# Patient Record
Sex: FEMALE | Race: WHITE | NOT HISPANIC OR LATINO | Employment: OTHER | URBAN - METROPOLITAN AREA
[De-identification: names, ages, dates, MRNs, and addresses within clinical notes are randomized per-mention and may not be internally consistent; named-entity substitution may affect disease eponyms.]

---

## 2017-02-08 ENCOUNTER — HOSPITAL ENCOUNTER (OUTPATIENT)
Dept: PHYSICAL THERAPY | Facility: REHABILITATION | Age: 67
End: 2017-02-08
Attending: FAMILY MEDICINE
Payer: MEDICARE

## 2017-02-08 PROCEDURE — G8984 CARRY CURRENT STATUS: HCPCS | Mod: CJ

## 2017-02-08 PROCEDURE — G8985 CARRY GOAL STATUS: HCPCS | Mod: CI

## 2017-02-08 PROCEDURE — 97162 PT EVAL MOD COMPLEX 30 MIN: CPT

## 2017-02-08 PROCEDURE — 97161 PT EVAL LOW COMPLEX 20 MIN: CPT

## 2017-02-08 PROCEDURE — 97110 THERAPEUTIC EXERCISES: CPT

## 2017-02-10 ENCOUNTER — HOSPITAL ENCOUNTER (OUTPATIENT)
Dept: PHYSICAL THERAPY | Facility: REHABILITATION | Age: 67
End: 2017-02-10
Attending: FAMILY MEDICINE
Payer: MEDICARE

## 2017-02-10 PROCEDURE — 97110 THERAPEUTIC EXERCISES: CPT

## 2017-02-10 PROCEDURE — 97140 MANUAL THERAPY 1/> REGIONS: CPT

## 2017-02-10 PROCEDURE — 97012 MECHANICAL TRACTION THERAPY: CPT

## 2017-02-16 ENCOUNTER — HOSPITAL ENCOUNTER (OUTPATIENT)
Dept: PHYSICAL THERAPY | Facility: REHABILITATION | Age: 67
End: 2017-02-16
Attending: FAMILY MEDICINE
Payer: MEDICARE

## 2017-02-16 PROCEDURE — 97014 ELECTRIC STIMULATION THERAPY: CPT

## 2017-02-16 PROCEDURE — 97110 THERAPEUTIC EXERCISES: CPT

## 2017-02-17 ENCOUNTER — APPOINTMENT (OUTPATIENT)
Dept: PHYSICAL THERAPY | Facility: REHABILITATION | Age: 67
End: 2017-02-17
Attending: FAMILY MEDICINE
Payer: MEDICARE

## 2017-02-22 ENCOUNTER — HOSPITAL ENCOUNTER (OUTPATIENT)
Dept: PHYSICAL THERAPY | Facility: REHABILITATION | Age: 67
End: 2017-02-22
Attending: FAMILY MEDICINE
Payer: MEDICARE

## 2017-02-22 PROCEDURE — 97140 MANUAL THERAPY 1/> REGIONS: CPT

## 2017-02-22 PROCEDURE — 97110 THERAPEUTIC EXERCISES: CPT

## 2017-02-22 PROCEDURE — 97112 NEUROMUSCULAR REEDUCATION: CPT

## 2017-02-28 ENCOUNTER — HOSPITAL ENCOUNTER (OUTPATIENT)
Dept: RADIOLOGY | Facility: MEDICAL CENTER | Age: 67
End: 2017-02-28
Attending: OBSTETRICS & GYNECOLOGY
Payer: MEDICARE

## 2017-02-28 DIAGNOSIS — Z13.9 SCREENING: ICD-10-CM

## 2017-02-28 PROCEDURE — 77063 BREAST TOMOSYNTHESIS BI: CPT

## 2017-03-01 ENCOUNTER — HOSPITAL ENCOUNTER (OUTPATIENT)
Dept: PHYSICAL THERAPY | Facility: REHABILITATION | Age: 67
End: 2017-03-01
Attending: FAMILY MEDICINE
Payer: MEDICARE

## 2017-03-01 PROCEDURE — 97140 MANUAL THERAPY 1/> REGIONS: CPT | Mod: XU

## 2017-03-01 PROCEDURE — 97012 MECHANICAL TRACTION THERAPY: CPT

## 2017-03-01 PROCEDURE — 97110 THERAPEUTIC EXERCISES: CPT

## 2017-03-03 ENCOUNTER — HOSPITAL ENCOUNTER (OUTPATIENT)
Dept: PHYSICAL THERAPY | Facility: REHABILITATION | Age: 67
End: 2017-03-03
Attending: FAMILY MEDICINE
Payer: MEDICARE

## 2017-03-03 PROCEDURE — 97140 MANUAL THERAPY 1/> REGIONS: CPT

## 2017-03-03 PROCEDURE — 97110 THERAPEUTIC EXERCISES: CPT

## 2017-03-08 ENCOUNTER — HOSPITAL ENCOUNTER (OUTPATIENT)
Dept: PHYSICAL THERAPY | Facility: REHABILITATION | Age: 67
End: 2017-03-08
Attending: FAMILY MEDICINE
Payer: MEDICARE

## 2017-03-08 PROCEDURE — 97140 MANUAL THERAPY 1/> REGIONS: CPT

## 2017-03-08 PROCEDURE — 97110 THERAPEUTIC EXERCISES: CPT

## 2017-03-15 ENCOUNTER — HOSPITAL ENCOUNTER (OUTPATIENT)
Dept: PHYSICAL THERAPY | Facility: REHABILITATION | Age: 67
End: 2017-03-15
Attending: FAMILY MEDICINE
Payer: MEDICARE

## 2017-03-15 PROCEDURE — 97140 MANUAL THERAPY 1/> REGIONS: CPT

## 2017-03-15 PROCEDURE — 97110 THERAPEUTIC EXERCISES: CPT

## 2017-03-22 ENCOUNTER — HOSPITAL ENCOUNTER (OUTPATIENT)
Dept: PHYSICAL THERAPY | Facility: REHABILITATION | Age: 67
End: 2017-03-22
Attending: FAMILY MEDICINE
Payer: MEDICARE

## 2017-03-22 PROCEDURE — 97110 THERAPEUTIC EXERCISES: CPT

## 2017-03-22 PROCEDURE — 97140 MANUAL THERAPY 1/> REGIONS: CPT

## 2017-04-06 ENCOUNTER — HOSPITAL ENCOUNTER (OUTPATIENT)
Dept: PHYSICAL THERAPY | Facility: REHABILITATION | Age: 67
End: 2017-04-06
Attending: FAMILY MEDICINE
Payer: MEDICARE

## 2017-04-06 PROCEDURE — 97110 THERAPEUTIC EXERCISES: CPT

## 2017-04-06 PROCEDURE — G8986 CARRY D/C STATUS: HCPCS | Mod: CI

## 2017-04-06 PROCEDURE — G8985 CARRY GOAL STATUS: HCPCS | Mod: CI

## 2018-03-01 ENCOUNTER — HOSPITAL ENCOUNTER (OUTPATIENT)
Dept: RADIOLOGY | Facility: MEDICAL CENTER | Age: 68
End: 2018-03-01
Attending: OBSTETRICS & GYNECOLOGY
Payer: MEDICARE

## 2018-03-01 DIAGNOSIS — Z12.31 SCREENING MAMMOGRAM, ENCOUNTER FOR: ICD-10-CM

## 2018-03-01 PROCEDURE — 77063 BREAST TOMOSYNTHESIS BI: CPT

## 2018-05-18 ENCOUNTER — APPOINTMENT (RX ONLY)
Dept: URBAN - METROPOLITAN AREA CLINIC 4 | Facility: CLINIC | Age: 68
Setting detail: DERMATOLOGY
End: 2018-05-18

## 2018-05-18 DIAGNOSIS — L81.4 OTHER MELANIN HYPERPIGMENTATION: ICD-10-CM

## 2018-05-18 DIAGNOSIS — L82.1 OTHER SEBORRHEIC KERATOSIS: ICD-10-CM

## 2018-05-18 DIAGNOSIS — D18.0 HEMANGIOMA: ICD-10-CM

## 2018-05-18 DIAGNOSIS — D22 MELANOCYTIC NEVI: ICD-10-CM

## 2018-05-18 PROBLEM — D22.71 MELANOCYTIC NEVI OF RIGHT LOWER LIMB, INCLUDING HIP: Status: ACTIVE | Noted: 2018-05-18

## 2018-05-18 PROBLEM — D22.62 MELANOCYTIC NEVI OF LEFT UPPER LIMB, INCLUDING SHOULDER: Status: ACTIVE | Noted: 2018-05-18

## 2018-05-18 PROBLEM — D22.72 MELANOCYTIC NEVI OF LEFT LOWER LIMB, INCLUDING HIP: Status: ACTIVE | Noted: 2018-05-18

## 2018-05-18 PROBLEM — D18.01 HEMANGIOMA OF SKIN AND SUBCUTANEOUS TISSUE: Status: ACTIVE | Noted: 2018-05-18

## 2018-05-18 PROBLEM — D22.5 MELANOCYTIC NEVI OF TRUNK: Status: ACTIVE | Noted: 2018-05-18

## 2018-05-18 PROBLEM — D22.61 MELANOCYTIC NEVI OF RIGHT UPPER LIMB, INCLUDING SHOULDER: Status: ACTIVE | Noted: 2018-05-18

## 2018-05-18 PROCEDURE — 99213 OFFICE O/P EST LOW 20 MIN: CPT

## 2018-05-18 PROCEDURE — ? COUNSELING

## 2018-05-18 PROCEDURE — ? ADDITIONAL NOTES

## 2018-05-18 PROCEDURE — ? SUNSCREEN RECOMMENDATIONS

## 2018-05-18 ASSESSMENT — LOCATION SIMPLE DESCRIPTION DERM
LOCATION SIMPLE: RIGHT POSTERIOR UPPER ARM
LOCATION SIMPLE: RIGHT THIGH
LOCATION SIMPLE: LEFT ANTERIOR NECK
LOCATION SIMPLE: UPPER BACK
LOCATION SIMPLE: LEFT FOREARM
LOCATION SIMPLE: LEFT HAND
LOCATION SIMPLE: LEFT POSTERIOR UPPER ARM
LOCATION SIMPLE: RIGHT FOREARM
LOCATION SIMPLE: CHEST
LOCATION SIMPLE: RIGHT HAND
LOCATION SIMPLE: ABDOMEN
LOCATION SIMPLE: RIGHT CHEEK
LOCATION SIMPLE: LEFT THIGH
LOCATION SIMPLE: LEFT CHEEK
LOCATION SIMPLE: LEFT UPPER BACK

## 2018-05-18 ASSESSMENT — LOCATION DETAILED DESCRIPTION DERM
LOCATION DETAILED: PERIUMBILICAL SKIN
LOCATION DETAILED: SUPERIOR THORACIC SPINE
LOCATION DETAILED: RIGHT CENTRAL MALAR CHEEK
LOCATION DETAILED: RIGHT ANTERIOR PROXIMAL THIGH
LOCATION DETAILED: LEFT INFERIOR ANTERIOR NECK
LOCATION DETAILED: LEFT CENTRAL MALAR CHEEK
LOCATION DETAILED: MIDDLE STERNUM
LOCATION DETAILED: RIGHT RADIAL DORSAL HAND
LOCATION DETAILED: LEFT ANTERIOR PROXIMAL THIGH
LOCATION DETAILED: LEFT PROXIMAL DORSAL FOREARM
LOCATION DETAILED: LEFT ULNAR DORSAL HAND
LOCATION DETAILED: LEFT PROXIMAL POSTERIOR UPPER ARM
LOCATION DETAILED: RIGHT DISTAL POSTERIOR UPPER ARM
LOCATION DETAILED: RIGHT PROXIMAL DORSAL FOREARM
LOCATION DETAILED: LEFT SUPERIOR MEDIAL UPPER BACK

## 2018-05-18 ASSESSMENT — LOCATION ZONE DERM
LOCATION ZONE: HAND
LOCATION ZONE: NECK
LOCATION ZONE: FACE
LOCATION ZONE: LEG
LOCATION ZONE: TRUNK
LOCATION ZONE: ARM

## 2018-06-06 ENCOUNTER — HOSPITAL ENCOUNTER (OUTPATIENT)
Dept: RADIOLOGY | Facility: MEDICAL CENTER | Age: 68
End: 2018-06-06
Attending: PHYSICIAN ASSISTANT
Payer: MEDICARE

## 2018-06-06 DIAGNOSIS — F41.9 ANXIETY HYPERVENTILATION: ICD-10-CM

## 2018-06-06 DIAGNOSIS — K29.40 CHRONIC EROSIVE GASTRITIS: ICD-10-CM

## 2018-06-06 DIAGNOSIS — R13.12 OROPHARYNGEAL DYSPHAGIA: ICD-10-CM

## 2018-06-06 DIAGNOSIS — F45.8 ANXIETY HYPERVENTILATION: ICD-10-CM

## 2018-06-06 PROCEDURE — G8998 SWALLOW D/C STATUS: HCPCS | Mod: CI

## 2018-06-06 PROCEDURE — G8996 SWALLOW CURRENT STATUS: HCPCS | Mod: CI

## 2018-06-06 PROCEDURE — 92611 MOTION FLUOROSCOPY/SWALLOW: CPT

## 2018-06-06 PROCEDURE — 74230 X-RAY XM SWLNG FUNCJ C+: CPT

## 2018-06-06 PROCEDURE — G8997 SWALLOW GOAL STATUS: HCPCS | Mod: CI

## 2018-06-06 NOTE — OP THERAPY EVALUATION
[facility]  Speech-Language Pathology Department  Modified Barium Swallow Study Summary    Patient: Yaz Farooq     Date of Evaluation:  6/6/2018    Referring MD: Odell Baptiste PA-C      Patient History/Reason for Referral:  Pt reports solids and liquids hanging up in her throat.  Also c/o intermittent hoarseness.  History of GERD and intestinal metaplasia.  She is scheduled for an EGD this week.     Procedure Results:  The examination was conducted with videofluoroscopy from a   Lateral and Anterior view.  The following textures were presented:   Applesauce, Diced Fruit, Cookie and Thin Liquid       The following observations were made:    Oral Phase Puree Thick Liquids Thin Liquids Mechanical Soft Solid   Anterior spillage        Residue in oral cavity after the swallow WFL N/A WFL  WFL   Decreased mastication        Loss of bolus control        Piecemeal deglutition    x    Slow oral transit time        Premature spillage into the valleculae        Premature spillage into the pyriform sinus           Other Observations:  Chews well.         Pharyngeal Phase Puree Thick Liquids Thin Liquids Mechanical Soft Solid   Delayed triggering of the pharyngeal swallow  N/A   WFL    Absent initiation of swallow        Residue on tongue base        Residue in lateral channels x       Residue in valleculae x  x     Residue in pyriform sinus x  x  x   Penetration        Aspiration          Other Observations:  Mild pharyngeal weakness with decreased epiglottal inversion resulting in pharyngeal residue.  Residue is decreased or clears with double swallows.                              Esophageal Phase:  A-P view completed with cookie and thin liquids.  Slow motility in upper esophagus with cookie followed by throat clearing and coughing.                                 Impressions:  Mild oral-pharyngeal dysphagia characterized by mild decreased bot ret and epiglottal inversion resulting in  pharyngeal residue.  Residue decreases or is eliminated with double swallow.          Recommendations: Diet:  Soft-moist       Liquid:  Thin                                        Medications:  As tolerated        Compensatory Strategies:  1.  Double swallow every couple bites/sips  2.  Effortful swallow  3.  Reflux precautions          Medicare only:   CI   CI      CI        Thank you for the referral.    For further questions, please call [phone].        Gabrielle Juan MA, CCC-SLP   Speech-Language Pathologist

## 2019-03-04 ENCOUNTER — HOSPITAL ENCOUNTER (OUTPATIENT)
Dept: RADIOLOGY | Facility: MEDICAL CENTER | Age: 69
End: 2019-03-04
Attending: OBSTETRICS & GYNECOLOGY
Payer: MEDICARE

## 2019-03-04 DIAGNOSIS — Z12.39 SCREENING BREAST EXAMINATION: ICD-10-CM

## 2019-03-04 PROCEDURE — 77063 BREAST TOMOSYNTHESIS BI: CPT

## 2019-06-10 ENCOUNTER — APPOINTMENT (OUTPATIENT)
Dept: RADIOLOGY | Facility: MEDICAL CENTER | Age: 69
End: 2019-06-10
Attending: EMERGENCY MEDICINE
Payer: MEDICARE

## 2019-06-10 ENCOUNTER — HOSPITAL ENCOUNTER (EMERGENCY)
Facility: MEDICAL CENTER | Age: 69
End: 2019-06-10
Attending: EMERGENCY MEDICINE
Payer: MEDICARE

## 2019-06-10 VITALS
HEIGHT: 60 IN | RESPIRATION RATE: 19 BRPM | WEIGHT: 143.52 LBS | OXYGEN SATURATION: 97 % | TEMPERATURE: 99.8 F | HEART RATE: 82 BPM | SYSTOLIC BLOOD PRESSURE: 141 MMHG | BODY MASS INDEX: 28.18 KG/M2 | DIASTOLIC BLOOD PRESSURE: 86 MMHG

## 2019-06-10 DIAGNOSIS — R53.83 FATIGUE, UNSPECIFIED TYPE: ICD-10-CM

## 2019-06-10 DIAGNOSIS — M75.81 ROTATOR CUFF TENDINITIS, RIGHT: ICD-10-CM

## 2019-06-10 DIAGNOSIS — E87.6 HYPOKALEMIA: ICD-10-CM

## 2019-06-10 LAB
ALBUMIN SERPL BCP-MCNC: 3.9 G/DL (ref 3.2–4.9)
ALBUMIN/GLOB SERPL: 1.3 G/DL
ALP SERPL-CCNC: 82 U/L (ref 30–99)
ALT SERPL-CCNC: 24 U/L (ref 2–50)
ANION GAP SERPL CALC-SCNC: 11 MMOL/L (ref 0–11.9)
APPEARANCE UR: CLEAR
AST SERPL-CCNC: 30 U/L (ref 12–45)
BASOPHILS # BLD AUTO: 0.3 % (ref 0–1.8)
BASOPHILS # BLD: 0.03 K/UL (ref 0–0.12)
BILIRUB SERPL-MCNC: 0.7 MG/DL (ref 0.1–1.5)
BILIRUB UR QL STRIP.AUTO: NEGATIVE
BUN SERPL-MCNC: 11 MG/DL (ref 8–22)
CALCIUM SERPL-MCNC: 9.1 MG/DL (ref 8.4–10.2)
CHLORIDE SERPL-SCNC: 103 MMOL/L (ref 96–112)
CK SERPL-CCNC: 132 U/L (ref 0–154)
CO2 SERPL-SCNC: 24 MMOL/L (ref 20–33)
COLOR UR: YELLOW
CREAT SERPL-MCNC: 0.62 MG/DL (ref 0.5–1.4)
EKG IMPRESSION: NORMAL
EOSINOPHIL # BLD AUTO: 0.13 K/UL (ref 0–0.51)
EOSINOPHIL NFR BLD: 1.2 % (ref 0–6.9)
ERYTHROCYTE [DISTWIDTH] IN BLOOD BY AUTOMATED COUNT: 45.8 FL (ref 35.9–50)
GLOBULIN SER CALC-MCNC: 3 G/DL (ref 1.9–3.5)
GLUCOSE SERPL-MCNC: 105 MG/DL (ref 65–99)
GLUCOSE UR STRIP.AUTO-MCNC: NEGATIVE MG/DL
HCT VFR BLD AUTO: 44.2 % (ref 37–47)
HGB BLD-MCNC: 14.5 G/DL (ref 12–16)
IMM GRANULOCYTES # BLD AUTO: 0.04 K/UL (ref 0–0.11)
IMM GRANULOCYTES NFR BLD AUTO: 0.4 % (ref 0–0.9)
KETONES UR STRIP.AUTO-MCNC: NEGATIVE MG/DL
LEUKOCYTE ESTERASE UR QL STRIP.AUTO: NEGATIVE
LYMPHOCYTES # BLD AUTO: 1.58 K/UL (ref 1–4.8)
LYMPHOCYTES NFR BLD: 14.5 % (ref 22–41)
MCH RBC QN AUTO: 32.1 PG (ref 27–33)
MCHC RBC AUTO-ENTMCNC: 32.8 G/DL (ref 33.6–35)
MCV RBC AUTO: 97.8 FL (ref 81.4–97.8)
MICRO URNS: NORMAL
MONOCYTES # BLD AUTO: 0.98 K/UL (ref 0–0.85)
MONOCYTES NFR BLD AUTO: 9 % (ref 0–13.4)
NEUTROPHILS # BLD AUTO: 8.17 K/UL (ref 2–7.15)
NEUTROPHILS NFR BLD: 74.6 % (ref 44–72)
NITRITE UR QL STRIP.AUTO: NEGATIVE
NRBC # BLD AUTO: 0 K/UL
NRBC BLD-RTO: 0 /100 WBC
PH UR STRIP.AUTO: 6 [PH]
PLATELET # BLD AUTO: 183 K/UL (ref 164–446)
PMV BLD AUTO: 11.7 FL (ref 9–12.9)
POTASSIUM SERPL-SCNC: 3.5 MMOL/L (ref 3.6–5.5)
PROT SERPL-MCNC: 6.9 G/DL (ref 6–8.2)
PROT UR QL STRIP: NEGATIVE MG/DL
RBC # BLD AUTO: 4.52 M/UL (ref 4.2–5.4)
RBC UR QL AUTO: NEGATIVE
SODIUM SERPL-SCNC: 138 MMOL/L (ref 135–145)
SP GR UR STRIP.AUTO: <=1.005
T4 FREE SERPL-MCNC: 0.95 NG/DL (ref 0.58–1.64)
TROPONIN I SERPL-MCNC: <0.02 NG/ML (ref 0–0.04)
TSH SERPL DL<=0.005 MIU/L-ACNC: 3.76 UIU/ML (ref 0.38–5.33)
WBC # BLD AUTO: 10.9 K/UL (ref 4.8–10.8)

## 2019-06-10 PROCEDURE — 84443 ASSAY THYROID STIM HORMONE: CPT

## 2019-06-10 PROCEDURE — 81003 URINALYSIS AUTO W/O SCOPE: CPT

## 2019-06-10 PROCEDURE — 99284 EMERGENCY DEPT VISIT MOD MDM: CPT

## 2019-06-10 PROCEDURE — 84484 ASSAY OF TROPONIN QUANT: CPT

## 2019-06-10 PROCEDURE — 93005 ELECTROCARDIOGRAM TRACING: CPT | Performed by: EMERGENCY MEDICINE

## 2019-06-10 PROCEDURE — 36415 COLL VENOUS BLD VENIPUNCTURE: CPT

## 2019-06-10 PROCEDURE — 93005 ELECTROCARDIOGRAM TRACING: CPT

## 2019-06-10 PROCEDURE — 70450 CT HEAD/BRAIN W/O DYE: CPT

## 2019-06-10 PROCEDURE — 72125 CT NECK SPINE W/O DYE: CPT

## 2019-06-10 PROCEDURE — 82550 ASSAY OF CK (CPK): CPT

## 2019-06-10 PROCEDURE — 80053 COMPREHEN METABOLIC PANEL: CPT

## 2019-06-10 PROCEDURE — 73030 X-RAY EXAM OF SHOULDER: CPT | Mod: RT

## 2019-06-10 PROCEDURE — 84439 ASSAY OF FREE THYROXINE: CPT

## 2019-06-10 PROCEDURE — 85025 COMPLETE CBC W/AUTO DIFF WBC: CPT

## 2019-06-10 RX ORDER — LEVOTHYROXINE SODIUM 0.05 MG/1
50 TABLET ORAL
Status: SHIPPED | COMMUNITY
End: 2020-08-13

## 2019-06-10 NOTE — ED PROVIDER NOTES
ED Provider Note    CHIEF COMPLAINT  Fatigue  Right shoulder pain    HPI  Yaz Farooq is a 69 y.o. female who presents to the emergency department with a chief complaint of right shoulder pain.  States that the pain started last night out of the blue.  She denies any inciting event.  States that she had a [place]  She ate out and just perform some desk duties.  She noticed pain over her right lateral shoulder.  No radiation to the more distal extremity, but slight pain and points over her right trapezius as well as her anterior right neck.  The pain in the right shoulder is worse when she tries to lift up the shoulder.  Has mild pain when she uses her other arm to lift the right shoulder.  She has not noticed swelling or fever.  No trauma or deformity.  No weakness numbness or neurologic symptoms distally.    Patient states that over the last several weeks she has been very tired.  She describes having a foggy head and just not feeling right.  Vague headaches.  Diffuse.  No focal weakness numbness.  She was seen by her primary provider.  She was diagnosed with hypothyroidism.  She was started on 50 mcg of Synthroid daily 9 days ago.  Since starting this she reports that her vision just seems fuzzy.  This is both of her eyes.  No vision loss.  No shade-like vision loss.  No eye pain.  Denies chest pain abdominal pain.  No cough or difficulty breathing.  No lower extremity swelling.  No dysuria hematuria frequency.      REVIEW OF SYSTEMS  As per HPI, otherwise a 10 point review of systems is negative    PAST MEDICAL HISTORY  Hypothyroidism    SOCIAL HISTORY  Social History   Substance Use Topics   • Smoking status: Never Smoker   • Smokeless tobacco: Never Used   • Alcohol use Yes      Comment: 3-4 GLASSES OF WINE A WEEK       SURGICAL HISTORY  Past Surgical History:   Procedure Laterality Date   • OTHER ORTHOPEDIC SURGERY         CURRENT MEDICATIONS  Home Medications      Reviewed by Loraine Hector PhT (Pharmacy Tech) on 06/10/19 at 0736  Med List Status: Complete   Medication Last Dose Status   aspirin 81 MG tablet 6/9/2019 Active   levothyroxine (SYNTHROID) 50 MCG Tab 6/10/2019 Active                ALLERGIES  Allergies   Allergen Reactions   • Amoxicillin-Clavulanic Acid [Amoxicillin-Pot Clavulanate]      Pain in stomach   • Prochlorperazine Edisylate [Compazine] Shortness of Breath and Swelling       PHYSICAL EXAM  VITAL SIGNS: /86   Pulse 86   Temp 37.7 °C (99.8 °F) (Temporal)   Resp 18   Ht 1.524 m (5')   Wt 65.1 kg (143 lb 8.3 oz)   SpO2 96%   BMI 28.03 kg/m²    Constitutional: Awake and alert  HENT:  Atraumatic, Normocephalic.Oropharynx moist mucus membranes, Nose normal inspection.  No tenderness over the temporal arteries  Eyes: Pupils equal round and reactive to light.  Normal extraocular muscles.  Neck: Minimal right-sided tenderness including anteriorly and posteriorly.  No asymmetry.  No bruit.  Full range of motion.  Mild right-sided trapezius tenderness.  Cardiovascular: Normal heart rate, Normal rhythm.  Symmetric peripheral pulses.   Thorax & Lungs: No respiratory distress, No wheezing, No rales, No rhonchi, No chest tenderness.   Abdomen: Bowel sounds normal, soft, non-distended, nontender, no mass  Skin: Warm, Dry, No rash.   Back: No tenderness, No CVA tenderness.   Extremities: There is tenderness of the right shoulder just distal to the right acromioclavicular joint.  There is no joint effusion.  There is mild pain with passive range of motion.  Increased discomfort with right shoulder abduction and extension.  No deformity over the humerus or tenderness of the elbow.  Neurologic: Grossly normal   Psychiatric: Anxious appearing    RADIOLOGY/PROCEDURES  CT-CSPINE WITHOUT PLUS RECONS   Final Result      1.  No evidence of cervical spine fracture.      2.  Multilevel degenerative disc disease and facet degeneration.      CT-HEAD W/O   Final Result       1.  No evidence of acute intracranial process.      2.  Periventricular chronic small vessel ischemic change.      DX-SHOULDER 2+ RIGHT   Final Result      Mild right glenohumeral joint degenerative change. No evidence of fracture.           Imaging is interpreted by radiologist    Labs:  Results for orders placed or performed during the hospital encounter of 06/10/19   CBC WITH DIFFERENTIAL   Result Value Ref Range    WBC 10.9 (H) 4.8 - 10.8 K/uL    RBC 4.52 4.20 - 5.40 M/uL    Hemoglobin 14.5 12.0 - 16.0 g/dL    Hematocrit 44.2 37.0 - 47.0 %    MCV 97.8 81.4 - 97.8 fL    MCH 32.1 27.0 - 33.0 pg    MCHC 32.8 (L) 33.6 - 35.0 g/dL    RDW 45.8 35.9 - 50.0 fL    Platelet Count 183 164 - 446 K/uL    MPV 11.7 9.0 - 12.9 fL    Neutrophils-Polys 74.60 (H) 44.00 - 72.00 %    Lymphocytes 14.50 (L) 22.00 - 41.00 %    Monocytes 9.00 0.00 - 13.40 %    Eosinophils 1.20 0.00 - 6.90 %    Basophils 0.30 0.00 - 1.80 %    Immature Granulocytes 0.40 0.00 - 0.90 %    Nucleated RBC 0.00 /100 WBC    Neutrophils (Absolute) 8.17 (H) 2.00 - 7.15 K/uL    Lymphs (Absolute) 1.58 1.00 - 4.80 K/uL    Monos (Absolute) 0.98 (H) 0.00 - 0.85 K/uL    Eos (Absolute) 0.13 0.00 - 0.51 K/uL    Baso (Absolute) 0.03 0.00 - 0.12 K/uL    Immature Granulocytes (abs) 0.04 0.00 - 0.11 K/uL    NRBC (Absolute) 0.00 K/uL   COMP METABOLIC PANEL   Result Value Ref Range    Sodium 138 135 - 145 mmol/L    Potassium 3.5 (L) 3.6 - 5.5 mmol/L    Chloride 103 96 - 112 mmol/L    Co2 24 20 - 33 mmol/L    Anion Gap 11.0 0.0 - 11.9    Glucose 105 (H) 65 - 99 mg/dL    Bun 11 8 - 22 mg/dL    Creatinine 0.62 0.50 - 1.40 mg/dL    Calcium 9.1 8.4 - 10.2 mg/dL    AST(SGOT) 30 12 - 45 U/L    ALT(SGPT) 24 2 - 50 U/L    Alkaline Phosphatase 82 30 - 99 U/L    Total Bilirubin 0.7 0.1 - 1.5 mg/dL    Albumin 3.9 3.2 - 4.9 g/dL    Total Protein 6.9 6.0 - 8.2 g/dL    Globulin 3.0 1.9 - 3.5 g/dL    A-G Ratio 1.3 g/dL   TROPONIN   Result Value Ref Range    Troponin I <0.02 0.00 - 0.04  ng/mL   URINALYSIS CULTURE, IF INDICATED   Result Value Ref Range    Color Yellow     Character Clear     Specific Gravity <=1.005 <1.035    Ph 6.0 5.0 - 8.0    Glucose Negative Negative mg/dL    Ketones Negative Negative mg/dL    Protein Negative Negative mg/dL    Bilirubin Negative Negative    Nitrite Negative Negative    Leukocyte Esterase Negative Negative    Occult Blood Negative Negative    Micro Urine Req see below    TSH   Result Value Ref Range    TSH 3.760 0.380 - 5.330 uIU/mL   FREE THYROXINE   Result Value Ref Range    Free T-4 0.95 0.58 - 1.64 ng/dL   CREATINE KINASE   Result Value Ref Range    CPK Total 132 0 - 154 U/L   ESTIMATED GFR   Result Value Ref Range    GFR If African American >60 >60 mL/min/1.73 m 2    GFR If Non African American >60 >60 mL/min/1.73 m 2   EKG   Result Value Ref Range    Report       [facility] Emergency Dept.    Test Date:  2019-06-10  Pt Name:    MEÑO QUILES             Department: [facility]  MRN:        [id number]                      [facility]  Gender:     Female                       Technician: RAFAELA  :        [id number]                   Requested By:ER TRIAGE PROTOCOL  Order #:    [id number]                    Reading MD: ZACHERY CANTOR MD    Measurements  Intervals                                Axis  Rate:       87                           P:          -42  MI:         144                          QRS:        3  QRSD:       89                           T:          21  QT:         358  QTc:        431    Interpretive Statements  Sinus rhythm  Borderline low voltage, extremity leads  No previous ECG available for comparison    Electronically Signed On 6- 8:11:37 PDT by ZACHERY CANTOR MD         COURSE & MEDICAL DECISION MAKING  Patient presents with complaints as listed above.  Her physical exam was benign.  She has reproducible active pain over the right shoulder supraspinatus primarily suggesting tendinitis.  She does not  have a joint effusion or remarkable pain with passive range of motion.  No fever.  No suggestion of a septic arthritis.  Because of her fatigue a thorough evaluation was undertaken.  I did obtain CT scan of the head and cervical spine because of neck pain as well as complaints of headaches without any focal neurologic symptoms.  The studies returned negative.  Laboratory demonstrates a WBC count that is just barely out of the upper limits of normal.  She has mild hypokalemia.  Her thyroid function is normal.  She is a constant symptoms with a unremarkable EKG and a normal troponin.  She does not have any focal findings on exam or by data to suggest an infectious process.      At this point of advised symptomatic management for her right rotator cuff tendinitis.  NSAIDs as needed, Tylenol as needed.  Ice rest.  Gentle range of motion exercises.    I would like her to follow-up with her primary provider for further evaluation with regards to her generalized fatigue that at this point is unexplained.  She describes noticing her vision getting worse.  Of advised that she see her eye doctor for evaluation.  She does not have any alarming findings on exam.    I precautioned her to return the ER for any chest pain, shortness of breath, focal weakness or numbness, abdominal pain or specific symptoms.    FINAL IMPRESSION  1.  Fatigue  2.  Right rotator cuff tendinitis      This dictation was created using voice recognition software. The accuracy of the dictation is limited to the abilities of the software.  The nursing notes were reviewed and certain aspects of this information were incorporated into this note.      Electronically signed by: Harry Arellano, 6/10/2019 8:06 AM

## 2019-06-10 NOTE — ED NOTES
Discharge instructions provided.  Pt verbalized the understanding of discharge instructions to follow up with PCP and to return to ER if condition worsens.  Pt ambulated out of ER without difficulty. RX-0.

## 2019-06-10 NOTE — ED NOTES
Med rec complete per pt. Allergies verified and updated. Per pt, she was started on synthroid approximately 10 days ago

## 2019-06-10 NOTE — ED NOTES
Pt stated that she feels like she can provide a UA at this time. Pt was offered a WC or a commode. Pt refusing both at this time and states she would rather walk. Pt assisted to restroom at this time with contact assist

## 2019-06-10 NOTE — ED NOTES
ERP at bedside. Pt agrees with plan of care discussed by ERP. AIDET acknowledged with patient. Jessi in low position, side rail up for pt safety. Call light within reach. Will continue to monitor.

## 2019-06-10 NOTE — ED TRIAGE NOTES
Chief Complaint   Patient presents with   • Blurred Vision     started 9 days ago after starting synthroid. also c/o dry mouth, palpitations,  body aches and pain to JOAN.    • Weakness     /86   Pulse 86   Temp 37.7 °C (99.8 °F) (Temporal)   Resp 18   Ht 1.524 m (5')   Wt 65.1 kg (143 lb 8.3 oz)   SpO2 96%   BMI 28.03 kg/m²

## 2020-03-05 ENCOUNTER — HOSPITAL ENCOUNTER (OUTPATIENT)
Dept: RADIOLOGY | Facility: MEDICAL CENTER | Age: 70
End: 2020-03-05
Attending: OBSTETRICS & GYNECOLOGY
Payer: MEDICARE

## 2020-03-05 DIAGNOSIS — Z12.31 OTHER SCREENING MAMMOGRAM: ICD-10-CM

## 2020-03-05 PROCEDURE — 77067 SCR MAMMO BI INCL CAD: CPT

## 2020-05-18 ENCOUNTER — TELEPHONE (OUTPATIENT)
Dept: SCHEDULING | Facility: IMAGING CENTER | Age: 70
End: 2020-05-18

## 2020-05-20 ENCOUNTER — OFFICE VISIT (OUTPATIENT)
Dept: MEDICAL GROUP | Facility: PHYSICIAN GROUP | Age: 70
End: 2020-05-20
Payer: MEDICARE

## 2020-05-20 VITALS
WEIGHT: 134.6 LBS | DIASTOLIC BLOOD PRESSURE: 72 MMHG | HEART RATE: 88 BPM | OXYGEN SATURATION: 97 % | TEMPERATURE: 98.5 F | RESPIRATION RATE: 20 BRPM | BODY MASS INDEX: 26.42 KG/M2 | SYSTOLIC BLOOD PRESSURE: 110 MMHG | HEIGHT: 60 IN

## 2020-05-20 DIAGNOSIS — E06.3 HASHIMOTO'S THYROIDITIS: ICD-10-CM

## 2020-05-20 DIAGNOSIS — F43.21 GRIEVING: ICD-10-CM

## 2020-05-20 DIAGNOSIS — Z11.59 NEED FOR HEPATITIS C SCREENING TEST: ICD-10-CM

## 2020-05-20 DIAGNOSIS — K21.9 GASTROESOPHAGEAL REFLUX DISEASE WITHOUT ESOPHAGITIS: ICD-10-CM

## 2020-05-20 PROCEDURE — 99205 OFFICE O/P NEW HI 60 MIN: CPT | Performed by: NURSE PRACTITIONER

## 2020-05-20 RX ORDER — THYROID, PORCINE 60 MG/1
65 TABLET ORAL DAILY
COMMUNITY
End: 2020-08-13 | Stop reason: SDUPTHER

## 2020-05-20 ASSESSMENT — PATIENT HEALTH QUESTIONNAIRE - PHQ9: CLINICAL INTERPRETATION OF PHQ2 SCORE: 0

## 2020-05-20 ASSESSMENT — FIBROSIS 4 INDEX: FIB4 SCORE: 2.34

## 2020-05-20 NOTE — ASSESSMENT & PLAN NOTE
New to me. Well controlled on Prilosec 20 mg daily. Managed by Dr Art GI. S/p EGD in 2018 w/ hiatal hernia. She also had mild oropharyngeal dysphagia, evaluated by ENT, Dr. Dye did not see any abnormalities on exam.

## 2020-05-20 NOTE — ASSESSMENT & PLAN NOTE
Lost  in 2013. Has been placed on medications for sleep in the past, has not worked well. Support systems includes children and friends. No SI /HI, pt she has a lot to live for.  Does have a few friends and staying active.  Patient states cough.  Has been difficult because she cannot do her usual activities such as going to the gym and seeing her friends.

## 2020-05-20 NOTE — PROGRESS NOTES
Chief Complaint   Patient presents with   • Establish Care       HISTORY OF PRESENT ILLNESS: Patient is a 69 y.o. female, established patient who presents today to discuss medical problems as listed below:    Health Maintenance:  COMPLETED.    Grieving  Lost  in 2013. Has been placed on medications for sleep in the past, has not worked well. Support systems includes children and friends. No SI /HI, pt she has a lot to live for.  Does have a few friends and staying active.  Patient states cough.  Has been difficult because she cannot do her usual activities such as going to the gym and seeing her friends.    GERD (gastroesophageal reflux disease)  New to me. Well controlled on Prilosec 20 mg daily. Managed by Dr Art GI. S/p EGD in 2018 w/ hiatal hernia. She also had mild oropharyngeal dysphagia, evaluated by ENT, . Hardik did not see any abnormalities on exam.    Euthroid Hashimoto's thyroiditis TSH 2.28/TPO(+) 843, June 2012  Chronic issue. Seeing Dr Jose Abrams for this. Currently taking levothyroxine 50 mcg and WP thyroid 65 mg. She has an appt with endo Dr Iverson on 6/8.  No recent data available.  Patient reports she had her labs done, will request records.  She is experiencing generalized anxiety.  Patient has lost her  7 years ago still continues to grieve, attributing part of anxiety on the grieving factor.  She does mention intermittent generalized body aches that come and go, skin if it is anything to do with her thyroid.  She denies CP, palpitations, headaches, weight loss, GI issues.      Patient Active Problem List    Diagnosis Date Noted   • Grieving 05/20/2020   • GERD (gastroesophageal reflux disease) 05/20/2020   • Euthroid Hashimoto's thyroiditis TSH 2.28/TPO(+) 843, June 2012 08/21/2012        Allergies: Amoxicillin-clavulanic acid [amoxicillin-pot clavulanate] and Prochlorperazine edisylate [compazine]    Current Outpatient Medications   Medication Sig Dispense Refill   •  thyroid (WP THYROID) 65 MG tablet Take 65 mg by mouth every day.     • levothyroxine (SYNTHROID) 50 MCG Tab Take 50 mcg by mouth Every morning on an empty stomach.     • aspirin 81 MG tablet Take 81 mg by mouth every day.       No current facility-administered medications for this visit.        Social History     Tobacco Use   • Smoking status: Never Smoker   • Smokeless tobacco: Never Used   Substance Use Topics   • Alcohol use: Yes     Comment: 3-4 GLASSES OF WINE A WEEK   • Drug use: No     Social History     Social History Narrative   • Not on file       Family History   Problem Relation Age of Onset   • Heart Disease Mother         MI   • Heart Disease Father 80        MI   • Cancer Sister         breast cancer   • Breast Cancer Sister        Allergies, past medical history, past surgical history, family history, social history reviewed and updated.    Review of Systems:     - Constitutional: Negative for fever, chills, unexpected weight change, and fatigue/generalized weakness.     - HEENT: Negative for headaches, vision changes, hearing changes, ear pain, ear discharge, rhinorrhea, sinus congestion, sore throat, and neck pain.      - Respiratory: Negative for cough, sputum production, chest congestion, dyspnea, wheezing, and crackles.      - Cardiovascular: Negative for chest pain, palpitations, orthopnea, and bilateral lower extremity edema.     - Gastrointestinal: Negative for heartburn, nausea, vomiting, abdominal pain, hematochezia, melena, diarrhea, constipation, and greasy/foul-smelling stools.     - Genitourinary: Negative for dysuria, polyuria, hematuria, pyuria, urinary urgency, and urinary incontinence.    - Musculoskeletal: Negative for myalgias, back pain, and joint pain.     - Skin: Negative for rash, itching, cyanotic skin color change.     - Neurological: Negative for dizziness, tingling, tremors, focal sensory deficit, focal weakness and headaches.     - Endo/Heme/Allergies: Does not  bruise/bleed easily.     - Psychiatric/Behavioral: Negative for depression, suicidal/homicidal ideation and memory loss.      All other systems reviewed and are negative    Exam:    /72 (BP Location: Left arm, Patient Position: Sitting, BP Cuff Size: Adult)   Pulse 88   Temp 36.9 °C (98.5 °F) (Temporal)   Resp 20   Ht 1.524 m (5')   Wt 61.1 kg (134 lb 9.6 oz)   SpO2 97%   BMI 26.29 kg/m²  Body mass index is 26.29 kg/m².    Physical Exam:  Constitutional: Well-developed and well-nourished. Not diaphoretic. No distress.   Skin: Skin is warm and dry. No rash noted.  Head: Atraumatic without lesions.  Eyes: Conjunctivae and extraocular motions are normal. Pupils are equal, round, and reactive to light. No scleral icterus.   Ears:  External ears unremarkable. Tympanic membranes clear and intact.  Nose: Nares patent. Septum midline. Turbinates without erythema nor edema. No discharge.   Mouth/Throat: Dentition is normal. Tongue normal. Oropharynx is clear and moist. Posterior pharynx without erythema or exudates.  Neck: Supple, trachea midline. Normal range of motion. No thyromegaly present. No lymphadenopathy--cervical or supraclavicular.  Cardiovascular: Regular rate and rhythm, S1 and S2 without murmur, rubs, or gallops.    Chest: Effort normal. Clear to auscultation throughout. No adventitious sounds. No CVA tenderness.  Abdomen: Soft, non tender, and without distention. Active bowel sounds in all four quadrants. No rebound, guarding, masses or HSM.  : Negative for dysuria, polyuria, hematuria, pyuria, urinary urgency, and urinary incontinence.  Extremities: No cyanosis, clubbing, erythema, nor edema. Distal pulses intact and symmetric.   Musculoskeletal: All major joints AROM full in all directions without pain.  Neurological: Alert and oriented x 3. DTRs 2+/3 and symmetric. No cranial nerve deficit. 5/5 myotomes. Sensation intact. Negative Rhomberg.  Psychiatric:  Behavior, mood, and affect are  appropriate.  MA/nursing note and vitals reviewed.    Patient was seen for 40 minutes face to face of which > 50% of appointment time was spent on counseling and coordination of care regarding the above.     Assessment/Plan:  1. Need for hepatitis C screening test  - HCV Scrn ( [id number] [employer]); Future    2. Grieving  Stable     5. Gastroesophageal reflux disease without esophagitis  Well-controlled on Prilosec and lifestyle.  Monitored by Dr. Dexter    6. Euthroid Hashimoto's thyroiditis TSH 2.28/TPO(+) 843, 2012  Will obtain labs for records.  Patient has an appointment with endocrinology on 2020.       Discussed with patient possible alternative diagnoses, pt is to take all medications as prescribed. If symptoms persist FU w/PCP, if symptoms worsen go to emergency room. If experiencing any side effects from prescribed medications reports to the office immediately or go to emergency room.  Reviewed indication, dosage, usage and potential adverse effects of prescribed medications. Reviewed risks and benefits of treatment plan. Patient verbalizes understanding of all instruction and verbally agrees to plan.    Return if symptoms worsen or fail to improve.

## 2020-05-20 NOTE — ASSESSMENT & PLAN NOTE
Chronic issue. Seeing Dr Jose Abrams for this. Currently taking levothyroxine 50 mcg and WP thyroid 65 mg. She has an appt with vicki Iverson on 6/8.  No recent data available.  Patient reports she had her labs done, will request records.  She is experiencing generalized anxiety.  Patient has lost her  7 years ago still continues to grieve, attributing part of anxiety on the grieving factor.  She does mention intermittent generalized body aches that come and go, skin if it is anything to do with her thyroid.  She denies CP, palpitations, headaches, weight loss, GI issues.

## 2020-06-08 ENCOUNTER — OFFICE VISIT (OUTPATIENT)
Dept: ENDOCRINOLOGY | Facility: MEDICAL CENTER | Age: 70
End: 2020-06-08
Payer: MEDICARE

## 2020-06-08 VITALS
HEIGHT: 60 IN | SYSTOLIC BLOOD PRESSURE: 128 MMHG | WEIGHT: 133.8 LBS | DIASTOLIC BLOOD PRESSURE: 28 MMHG | HEART RATE: 90 BPM | BODY MASS INDEX: 26.27 KG/M2

## 2020-06-08 DIAGNOSIS — E06.3 HYPOTHYROIDISM, ACQUIRED, AUTOIMMUNE: ICD-10-CM

## 2020-06-08 PROCEDURE — 99204 OFFICE O/P NEW MOD 45 MIN: CPT | Performed by: INTERNAL MEDICINE

## 2020-06-08 ASSESSMENT — FIBROSIS 4 INDEX: FIB4 SCORE: 2.34

## 2020-06-08 NOTE — PROGRESS NOTES
"Chief Complaint   Patient presents with   • Hypothyroidism     Autoimmune / BQS=459  + US      Endocrine consultation requested by Arabella BLACK for this 69-year-old with a history of thyroiditis.    HPI:         The patient insists that she saw me in 2012 concerning her Hashimoto's thyroiditis.  Actually she saw Dr. Melton but does not recognize the name.  Dr. Melton saw her on one occasion with reference to her hypothyroidism she motors thyroiditis.  She had definitely positive TPO antibodies at that time listed at 843.  Subsequently she is had a thyroid ultrasound done in 2012 which shows a \"heterogeneous\" thyroid gland which is also consistent with thyroiditis.  The gland is not enlarged or nodules present.        Back in 2012 there was some question whether or not she should be on thyroid hormone.  At one point a TSH was done with finding a result of 5.5.  He was given [place] Thyroid which caused abdominal pain.  Therefore the thyroid replacement was stopped and a follow-up TSH was 2.2 and therefore Dr. Melton did not recommend continued thyroid replacement.  He did recommend close follow-up.  Subsequently another provider gave her Synthroid which caused muscle pain.  I do not have reference to that encounter but it caused her to stop the thyroid again.         Most recently she has come under the care of Dr. Jose Abrams.  I have laboratory data from last fall.  November 4 TSH 19.5 and free thyroxine index 1.6 (1.4-3.8.  On November 8 TPO antibodies once again positive at 544.  I am not sure what took place at that point but the next lab result came in January 2020 finding a TSH of 90.  Free T4 0.2 free T3 1.1 and total T4 0.8 (4.8-10.4.  Markedly elevated iodine level of 481.  Which she was taking in bedtime is unclear.  I think at that point WP thyroid was prescribed in February 28 this year her TSH 2.9 with free T4 mid range at 1.0 and free T3 also mid range is 3.3          All other tests " were done at that time chemistry panel CBC which were normal iodine was down to 56 at she was taking nature thyroid 65 grains 1/4 grai vitamin D good at 60 and negative MARITZA and C-reactive protein sed rate 14.  She was complaining of diffuse myalgias hair loss and fatigue.          Last lab I have is from April 23 TSH= 0.9.  That time she was taking nature thyroid 65 g and another 1 portogram per day was added.  She still has diffuse myalgias.  Generally feeling.  We will see Dr. Abrams again for another thyroid     ROS:  The patient expresses a great deal of anxiety about the lack of a primary care physician who is an experienced internist.  She is trying to establish that relationship but so far unsuccessful.  Is a  and quite worried about future health and care.  Also very upset about the isolation related to COVID-19.  As the attending a gym for exercise and yoga classes that has been temporarily curtailed.      Allergies:   Allergies   Allergen Reactions   • Amoxicillin-Clavulanic Acid [Amoxicillin-Pot Clavulanate]      Pain in stomach   • Prochlorperazine Edisylate [Compazine] Shortness of Breath and Swelling       Current medicines including changes today:  Current Outpatient Medications   Medication Sig Dispense Refill   • thyroid (WP THYROID) 65 MG tablet Take 65 mg by mouth every day.     • levothyroxine (SYNTHROID) 50 MCG Tab Take 50 mcg by mouth Every morning on an empty stomach.     • aspirin 81 MG tablet Take 81 mg by mouth every day.       No current facility-administered medications for this visit.         Past Medical History:   Diagnosis Date   • Anxiety    • GERD (gastroesophageal reflux disease)    • Osteoporosis    • Thyroid disease 2019    hashimotos     Family history       Other family members have had hypothyroidism but no Hashimoto's as far she knows.    Social history          Patient is .  Previously she and her  had a good business and relationship is very secure at  the present time.    PHYSICAL EXAM:    BP (!) 128/28 (BP Location: Left arm, Patient Position: Sitting, BP Cuff Size: Adult)   Pulse 90   Ht 1.524 m (5')   Wt 60.7 kg (133 lb 12.8 oz)   BMI 26.13 kg/m²     Gen.   appears healthy     Skin   appropriate for sex and age    HEENT  unremarkable    Neck   gland is palpable and about normal size.  Somewhat firm consistent with thyroiditis              not namrata and no nodules    Heart  regular    Extremities  no edema    Neuro  gait and station normal    Psych  appropriate    ASSESSMENT AND RECOMMENDATIONS    1. Hypothyroidism, acquired, autoimmune             Post Hashimoto's thyroiditis in detail as Dr. Melton had previously in 2012.             She is definitely against taking levothyroxine or Synthroid.  Dr. Abrams is managing Nature thyroid which she is tolerating I encouraged her to continue under his care.  She is still complaining of diffuse myalgias Dr. Abrams has screen for conditions.  I suggested she might have fibromyalgia and consider seeing a rheumatologist.          DISPOSITION: I will try to help her establish with a PCP physician                             I think that would help her feel more secure for the future                             No need to return to my clinic      Elliot Iverson M.D.    Copies to: Arabella Chambers A.P.R.N. [phone]

## 2020-06-15 ENCOUNTER — TELEPHONE (OUTPATIENT)
Dept: ENDOCRINOLOGY | Facility: MEDICAL CENTER | Age: 70
End: 2020-06-15

## 2020-06-15 NOTE — TELEPHONE ENCOUNTER
During patient's last visit with Dr. Iverson he was going to talk with Dr. Keon Meza about having her establish care with him. Patient is calling to get an update to see if Dr. Iverson has been able to talk with Dr. Meza about this.

## 2020-06-30 ENCOUNTER — TELEPHONE (OUTPATIENT)
Dept: ENDOCRINOLOGY | Facility: MEDICAL CENTER | Age: 70
End: 2020-06-30

## 2020-06-30 NOTE — TELEPHONE ENCOUNTER
Patient states was told during her last visit with Dr. Iverson that he would assist her in setting her up with a PCP. Patient is calling to follow up on that.     [phone]

## 2020-07-01 ENCOUNTER — HOSPITAL ENCOUNTER (OUTPATIENT)
Dept: RADIOLOGY | Facility: MEDICAL CENTER | Age: 70
End: 2020-07-01
Attending: OBSTETRICS & GYNECOLOGY
Payer: MEDICARE

## 2020-07-01 DIAGNOSIS — Z80.41 FAMILY HISTORY OF MALIGNANT NEOPLASM OF OVARY: ICD-10-CM

## 2020-07-01 PROCEDURE — 76830 TRANSVAGINAL US NON-OB: CPT

## 2020-07-27 NOTE — TELEPHONE ENCOUNTER
Phone Number Called: [phone]    Call outcome: Left detailed message for patient. Informed to call back with any additional questions.    Message: Contacted patient to let her know per Dr. Iverson she should call the office to see if she can get in for an appointment. With Dr. Meza because Dr. Iverson stated he called him a while ago but never received an answer whether it was yes or no.

## 2020-08-13 ENCOUNTER — OFFICE VISIT (OUTPATIENT)
Dept: MEDICAL GROUP | Age: 70
End: 2020-08-13
Payer: MEDICARE

## 2020-08-13 VITALS
HEIGHT: 60 IN | SYSTOLIC BLOOD PRESSURE: 126 MMHG | WEIGHT: 132 LBS | OXYGEN SATURATION: 99 % | DIASTOLIC BLOOD PRESSURE: 80 MMHG | HEART RATE: 76 BPM | BODY MASS INDEX: 25.91 KG/M2 | TEMPERATURE: 97.5 F

## 2020-08-13 DIAGNOSIS — K21.9 GASTROESOPHAGEAL REFLUX DISEASE, ESOPHAGITIS PRESENCE NOT SPECIFIED: ICD-10-CM

## 2020-08-13 DIAGNOSIS — Z11.59 NEED FOR HEPATITIS C SCREENING TEST: ICD-10-CM

## 2020-08-13 DIAGNOSIS — E78.5 DYSLIPIDEMIA: ICD-10-CM

## 2020-08-13 DIAGNOSIS — H10.13 ALLERGIC CONJUNCTIVITIS OF BOTH EYES: ICD-10-CM

## 2020-08-13 DIAGNOSIS — E06.3 HYPOTHYROIDISM, ACQUIRED, AUTOIMMUNE: ICD-10-CM

## 2020-08-13 DIAGNOSIS — J30.89 ENVIRONMENTAL AND SEASONAL ALLERGIES: ICD-10-CM

## 2020-08-13 DIAGNOSIS — Z12.11 SCREEN FOR COLON CANCER: ICD-10-CM

## 2020-08-13 DIAGNOSIS — E55.9 VITAMIN D DEFICIENCY: ICD-10-CM

## 2020-08-13 DIAGNOSIS — R73.01 IFG (IMPAIRED FASTING GLUCOSE): ICD-10-CM

## 2020-08-13 PROCEDURE — 99204 OFFICE O/P NEW MOD 45 MIN: CPT | Performed by: INTERNAL MEDICINE

## 2020-08-13 RX ORDER — NAPHAZOLINE HYDROCHLORIDE AND PHENIRAMINE MALEATE .25; 3 MG/ML; MG/ML
1 SOLUTION/ DROPS OPHTHALMIC 4 TIMES DAILY
Qty: 5 ML | Refills: 4 | Status: SHIPPED | OUTPATIENT
Start: 2020-08-13 | End: 2021-12-07

## 2020-08-13 RX ORDER — THYROID, PORCINE 60 MG/1
65 TABLET ORAL DAILY
Qty: 90 TAB | Refills: 4 | Status: SHIPPED | DISCHARGE
Start: 2020-08-13 | End: 2022-08-03

## 2020-08-13 ASSESSMENT — ENCOUNTER SYMPTOMS
GASTROINTESTINAL NEGATIVE: 1
PSYCHIATRIC NEGATIVE: 1
RESPIRATORY NEGATIVE: 1
CONSTITUTIONAL NEGATIVE: 1
EYES NEGATIVE: 1
CARDIOVASCULAR NEGATIVE: 1
MUSCULOSKELETAL NEGATIVE: 1
NEUROLOGICAL NEGATIVE: 1

## 2020-08-13 ASSESSMENT — FIBROSIS 4 INDEX: FIB4 SCORE: 2.38

## 2020-08-13 NOTE — PROGRESS NOTES
Subjective:      Yaz Farooq is a 70 y.o. female who presents with Establish Care and Seasonal Allergies (itchy eyes )  Is a new patient here to get established.  Needs primary care physician.  Is followed locally by Dr. Abrams for her thyroid which according to most recent labs appears well controlled on current dosage.    Chief complaint is seasonal allergies with itchy watery eyes and runny nose and postnasal drip.    She is followed by Dr. Greenberg of the GI service for her chronic GERD and is due for her 10-year colonoscopy.    Reviewed past labs that show borderline elevated blood sugar which is not been followed with A1c.  And there is been no lipid panel was done.  The patient is here for followup of chronic medical problems listed below. The patient is compliant with medications and having no side effects from them. Denies chest pain, abdominal pain, dyspnea, myalgias, or cough.   Patient Active Problem List    Diagnosis Date Noted   • IFG (impaired fasting glucose) 08/13/2020   • Environmental and seasonal allergies 08/13/2020   • Allergic conjunctivitis of both eyes 08/13/2020   • Hypothyroidism, acquired, autoimmune 06/08/2020   • Grieving 05/20/2020   • GERD (gastroesophageal reflux disease) 05/20/2020     Allergies   Allergen Reactions   • Amoxicillin-Clavulanic Acid [Amoxicillin-Pot Clavulanate]      Pain in stomach   • Prochlorperazine Edisylate [Compazine] Shortness of Breath and Swelling     l    Outpatient Medications Prior to Visit   Medication Sig Dispense Refill   • thyroid (WP THYROID) 65 MG tablet Take 65 mg by mouth every day.     • levothyroxine (SYNTHROID) 50 MCG Tab Take 50 mcg by mouth Every morning on an empty stomach.     • aspirin 81 MG tablet Take 81 mg by mouth every day.       No facility-administered medications prior to visit.              HPI    Review of Systems   Constitutional: Negative.    HENT: Negative.    Eyes: Negative.    Respiratory: Negative.    Cardiovascular:  Negative.    Gastrointestinal: Negative.    Genitourinary: Negative.    Musculoskeletal: Negative.    Skin: Negative.    Neurological: Negative.    Endo/Heme/Allergies: Negative.    Psychiatric/Behavioral: Negative.           Objective:     /80 (BP Location: Right arm, Patient Position: Sitting, BP Cuff Size: Adult)   Pulse 76   Temp 36.4 °C (97.5 °F) (Temporal)   Ht 1.524 m (5')   Wt 59.9 kg (132 lb)   SpO2 99%   BMI 25.78 kg/m²      Physical Exam  Vitals signs reviewed.   Constitutional:       General: She is not in acute distress.     Appearance: She is well-developed. She is not diaphoretic.   HENT:      Head: Normocephalic and atraumatic.      Right Ear: External ear normal.      Left Ear: External ear normal.      Nose: Nose normal.      Mouth/Throat:      Pharynx: No oropharyngeal exudate.   Eyes:      General: No scleral icterus.        Right eye: No discharge.         Left eye: No discharge.      Conjunctiva/sclera: Conjunctivae normal.      Pupils: Pupils are equal, round, and reactive to light.   Neck:      Musculoskeletal: Normal range of motion and neck supple.      Thyroid: No thyromegaly.      Vascular: No JVD.      Trachea: No tracheal deviation.   Cardiovascular:      Rate and Rhythm: Normal rate and regular rhythm.      Heart sounds: Normal heart sounds. No murmur. No friction rub. No gallop.    Pulmonary:      Effort: Pulmonary effort is normal. No respiratory distress.      Breath sounds: Normal breath sounds. No stridor. No wheezing or rales.   Chest:      Chest wall: No tenderness.   Abdominal:      General: Bowel sounds are normal. There is no distension.      Palpations: Abdomen is soft. There is no mass.      Tenderness: There is no abdominal tenderness. There is no guarding or rebound.   Musculoskeletal: Normal range of motion.         General: No tenderness.   Lymphadenopathy:      Cervical: No cervical adenopathy.   Skin:     General: Skin is warm and dry.      Coloration:  Skin is not pale.      Findings: No erythema or rash.   Neurological:      Mental Status: She is alert and oriented to person, place, and time.      Cranial Nerves: No cranial nerve deficit.      Motor: No abnormal muscle tone.      Coordination: Coordination normal.      Deep Tendon Reflexes: Reflexes are normal and symmetric. Reflexes normal.   Psychiatric:         Behavior: Behavior normal.         Thought Content: Thought content normal.         Judgment: Judgment normal.                 Assessment/Plan:        1. Hypothyroidism, acquired, autoimmune    Under good control. Continue same regimen.  Patient will follow up with Dr. Abrams who is managing this, but I gave her the option for us to refill her medications should she prefer.    - TSH; Future  - Thyroid 16.25 MG Tab; Take 1 Tab by mouth every day. In addition to 65 mg thyroid pill  Dispense: 90 Tab; Refill: 4  - thyroid (WP THYROID) 65 MG tablet; Take 1 Tab by mouth every day. In addition to 16.25 mg thyroid pill  Dispense: 90 Tab; Refill: 4    2. Gastroesophageal reflux disease, esophagitis presence not specified-good control.  Continue current regimen.     - REFERRAL TO GASTROENTEROLOGY    3. IFG (impaired fasting glucose)  Needs further evaluation.  Labs ordered  - HEMOGLOBIN A1C; Future    4. Environmental and seasonal allergies  Mild allergic rhinitis and conjunctivitis.  Continue on saline nasal rinses and Claritin.  Start Flonase    5. Allergic conjunctivitis of both eyes-new problem.  Start antihistamine eyedrops.     - naphazoline-pheniramine (NAPHCON-A) 0.025-0.3 % ophthalmic solution; Place 1 Drop in both eyes 4 times a day.  Dispense: 5 mL; Refill: 4    6. Dyslipidemia-needs further evaluation.  Labs ordered     - TSH; Future  - Comp Metabolic Panel; Future  - Lipid Profile; Future  - CBC WITH DIFFERENTIAL; Future    7. Vitamin D deficiency-needs further evaluation labs ordered       - VITAMIN D,25 HYDROXY; Future    8. Screen for colon  cancer-as above      - REFERRAL TO GASTROENTEROLOGY    9. Need for hepatitis C screening test     - HEP C VIRUS ANTIBODY; Future

## 2020-08-14 ENCOUNTER — TELEPHONE (OUTPATIENT)
Dept: MEDICAL GROUP | Age: 70
End: 2020-08-14

## 2020-08-14 NOTE — TELEPHONE ENCOUNTER
1. Caller Name: Yaz Farooq                          Call Back Number: [phone] (home)         How would the patient prefer to be contacted with a response: Phone call OK to leave a detailed message    Pt believed that you would be sending a prescription for flonase to her pharmacy but that they have not recieved anything. I informed her that the flonase was not prescibed to her yesterday. She was wondering if you could prescibe it or if she would be able to buy it over the counter?

## 2020-08-17 NOTE — TELEPHONE ENCOUNTER
Phone Number Called: [phone]    Call outcome: Spoke to patient regarding message below.    Message: Informed patient to purchase Flonase OTC

## 2020-10-02 ENCOUNTER — TELEPHONE (OUTPATIENT)
Dept: MEDICAL GROUP | Age: 70
End: 2020-10-02

## 2020-10-03 NOTE — TELEPHONE ENCOUNTER
No.  No more pneumonia shots needed.  Just 1 vaccination each of the Prevnar 13 and Pneumovax 23 at or after age 65, and then no more are needed.

## 2020-10-03 NOTE — TELEPHONE ENCOUNTER
Phone Number Called: [phone] (home)       Call outcome: Spoke to patient regarding message below.    Message: Pt is scheduled to receive her flu shot on Monday 10/5/2020 but was wondering if she needed any more Pneumonia vaccinations as well? I informed her that it looks like she completed it but she thinks its needed ever 5 years? Please advise

## 2020-10-06 NOTE — TELEPHONE ENCOUNTER
Phone Number Called: [phone] (home)     Call outcome: Spoke to patient regarding message below.    Message: let pt know about message above

## 2020-10-21 ENCOUNTER — HOSPITAL ENCOUNTER (OUTPATIENT)
Dept: LAB | Facility: MEDICAL CENTER | Age: 70
End: 2020-10-21
Attending: INTERNAL MEDICINE
Payer: MEDICARE

## 2020-10-21 DIAGNOSIS — E78.5 DYSLIPIDEMIA: ICD-10-CM

## 2020-10-21 DIAGNOSIS — E06.3 HYPOTHYROIDISM, ACQUIRED, AUTOIMMUNE: ICD-10-CM

## 2020-10-21 DIAGNOSIS — R73.01 IFG (IMPAIRED FASTING GLUCOSE): ICD-10-CM

## 2020-10-21 DIAGNOSIS — Z11.59 NEED FOR HEPATITIS C SCREENING TEST: ICD-10-CM

## 2020-10-21 DIAGNOSIS — E55.9 VITAMIN D DEFICIENCY: ICD-10-CM

## 2020-10-21 LAB
25(OH)D3 SERPL-MCNC: 71 NG/ML (ref 30–100)
BASOPHILS # BLD AUTO: 0.4 % (ref 0–1.8)
BASOPHILS # BLD: 0.02 K/UL (ref 0–0.12)
EOSINOPHIL # BLD AUTO: 0.13 K/UL (ref 0–0.51)
EOSINOPHIL NFR BLD: 2.6 % (ref 0–6.9)
ERYTHROCYTE [DISTWIDTH] IN BLOOD BY AUTOMATED COUNT: 46.4 FL (ref 35.9–50)
EST. AVERAGE GLUCOSE BLD GHB EST-MCNC: 111 MG/DL
HBA1C MFR BLD: 5.5 % (ref 0–5.6)
HCT VFR BLD AUTO: 46.6 % (ref 37–47)
HCV AB SER QL: NORMAL
HGB BLD-MCNC: 15.4 G/DL (ref 12–16)
IMM GRANULOCYTES # BLD AUTO: 0.02 K/UL (ref 0–0.11)
IMM GRANULOCYTES NFR BLD AUTO: 0.4 % (ref 0–0.9)
LYMPHOCYTES # BLD AUTO: 1.74 K/UL (ref 1–4.8)
LYMPHOCYTES NFR BLD: 35.2 % (ref 22–41)
MCH RBC QN AUTO: 32.6 PG (ref 27–33)
MCHC RBC AUTO-ENTMCNC: 33 G/DL (ref 33.6–35)
MCV RBC AUTO: 98.7 FL (ref 81.4–97.8)
MONOCYTES # BLD AUTO: 0.48 K/UL (ref 0–0.85)
MONOCYTES NFR BLD AUTO: 9.7 % (ref 0–13.4)
NEUTROPHILS # BLD AUTO: 2.55 K/UL (ref 2–7.15)
NEUTROPHILS NFR BLD: 51.7 % (ref 44–72)
NRBC # BLD AUTO: 0 K/UL
NRBC BLD-RTO: 0 /100 WBC
PLATELET # BLD AUTO: 217 K/UL (ref 164–446)
PMV BLD AUTO: 11.8 FL (ref 9–12.9)
RBC # BLD AUTO: 4.72 M/UL (ref 4.2–5.4)
TSH SERPL DL<=0.005 MIU/L-ACNC: 2.16 UIU/ML (ref 0.38–5.33)
WBC # BLD AUTO: 4.9 K/UL (ref 4.8–10.8)

## 2020-10-21 PROCEDURE — 85025 COMPLETE CBC W/AUTO DIFF WBC: CPT

## 2020-10-21 PROCEDURE — 80061 LIPID PANEL: CPT

## 2020-10-21 PROCEDURE — 82306 VITAMIN D 25 HYDROXY: CPT

## 2020-10-21 PROCEDURE — 84443 ASSAY THYROID STIM HORMONE: CPT

## 2020-10-21 PROCEDURE — 83036 HEMOGLOBIN GLYCOSYLATED A1C: CPT | Mod: GA

## 2020-10-21 PROCEDURE — 80053 COMPREHEN METABOLIC PANEL: CPT

## 2020-10-21 PROCEDURE — 36415 COLL VENOUS BLD VENIPUNCTURE: CPT

## 2020-10-21 PROCEDURE — 86803 HEPATITIS C AB TEST: CPT

## 2020-10-22 LAB
ALBUMIN SERPL BCP-MCNC: 4.4 G/DL (ref 3.2–4.9)
ALBUMIN/GLOB SERPL: 1.6 G/DL
ALP SERPL-CCNC: 94 U/L (ref 30–99)
ALT SERPL-CCNC: 24 U/L (ref 2–50)
ANION GAP SERPL CALC-SCNC: 5 MMOL/L (ref 7–16)
AST SERPL-CCNC: 26 U/L (ref 12–45)
BILIRUB SERPL-MCNC: 0.4 MG/DL (ref 0.1–1.5)
BUN SERPL-MCNC: 12 MG/DL (ref 8–22)
CALCIUM SERPL-MCNC: 9.5 MG/DL (ref 8.5–10.5)
CHLORIDE SERPL-SCNC: 101 MMOL/L (ref 96–112)
CHOLEST SERPL-MCNC: 240 MG/DL (ref 100–199)
CO2 SERPL-SCNC: 30 MMOL/L (ref 20–33)
CREAT SERPL-MCNC: 0.51 MG/DL (ref 0.5–1.4)
FASTING STATUS PATIENT QL REPORTED: NORMAL
GLOBULIN SER CALC-MCNC: 2.8 G/DL (ref 1.9–3.5)
GLUCOSE SERPL-MCNC: 100 MG/DL (ref 65–99)
HDLC SERPL-MCNC: 78 MG/DL
LDLC SERPL CALC-MCNC: 144 MG/DL
POTASSIUM SERPL-SCNC: 4 MMOL/L (ref 3.6–5.5)
PROT SERPL-MCNC: 7.2 G/DL (ref 6–8.2)
SODIUM SERPL-SCNC: 136 MMOL/L (ref 135–145)
TRIGL SERPL-MCNC: 92 MG/DL (ref 0–149)

## 2020-10-28 ENCOUNTER — OFFICE VISIT (OUTPATIENT)
Dept: MEDICAL GROUP | Age: 70
End: 2020-10-28
Payer: MEDICARE

## 2020-10-28 VITALS
HEART RATE: 76 BPM | HEIGHT: 60 IN | TEMPERATURE: 97.4 F | DIASTOLIC BLOOD PRESSURE: 78 MMHG | BODY MASS INDEX: 25.8 KG/M2 | SYSTOLIC BLOOD PRESSURE: 120 MMHG | WEIGHT: 131.4 LBS | OXYGEN SATURATION: 98 %

## 2020-10-28 DIAGNOSIS — E55.9 VITAMIN D DEFICIENCY: ICD-10-CM

## 2020-10-28 DIAGNOSIS — E78.5 DYSLIPIDEMIA: ICD-10-CM

## 2020-10-28 DIAGNOSIS — R73.01 IFG (IMPAIRED FASTING GLUCOSE): ICD-10-CM

## 2020-10-28 DIAGNOSIS — E06.3 HYPOTHYROIDISM, ACQUIRED, AUTOIMMUNE: ICD-10-CM

## 2020-10-28 PROCEDURE — 99214 OFFICE O/P EST MOD 30 MIN: CPT | Performed by: INTERNAL MEDICINE

## 2020-10-28 ASSESSMENT — ENCOUNTER SYMPTOMS
CONSTITUTIONAL NEGATIVE: 1
CARDIOVASCULAR NEGATIVE: 1
NEUROLOGICAL NEGATIVE: 1
EYES NEGATIVE: 1
PSYCHIATRIC NEGATIVE: 1
MUSCULOSKELETAL NEGATIVE: 1
GASTROINTESTINAL NEGATIVE: 1
RESPIRATORY NEGATIVE: 1

## 2020-10-28 ASSESSMENT — FIBROSIS 4 INDEX: FIB4 SCORE: 1.74

## 2020-10-28 NOTE — PROGRESS NOTES
Subjective:      Yaz Farooq is a 70 y.o. female who presents with Lab Results  The patient is here for followup of chronic medical problems listed below. The patient is compliant with medications and having no side effects from them. Denies chest pain, abdominal pain, dyspnea, myalgias, or cough.   Patient Active Problem List    Diagnosis Date Noted   • IFG (impaired fasting glucose) 08/13/2020   • Environmental and seasonal allergies 08/13/2020   • Allergic conjunctivitis of both eyes 08/13/2020   • Hypothyroidism, acquired, autoimmune 06/08/2020   • Grieving 05/20/2020   • GERD (gastroesophageal reflux disease) 05/20/2020     Allergies   Allergen Reactions   • Amoxicillin-Clavulanic Acid [Amoxicillin-Pot Clavulanate]      Pain in stomach   • Prochlorperazine Edisylate [Compazine] Shortness of Breath and Swelling       Outpatient Medications Prior to Visit   Medication Sig Dispense Refill   • naphazoline-pheniramine (NAPHCON-A) 0.025-0.3 % ophthalmic solution Place 1 Drop in both eyes 4 times a day. 5 mL 4   • Thyroid 16.25 MG Tab Take 1 Tab by mouth every day. In addition to 65 mg thyroid pill 90 Tab 4   • thyroid (WP THYROID) 65 MG tablet Take 1 Tab by mouth every day. In addition to 16.25 mg thyroid pill 90 Tab 4     No facility-administered medications prior to visit.                HPI    Review of Systems   Constitutional: Negative.    HENT: Negative.    Eyes: Negative.    Respiratory: Negative.    Cardiovascular: Negative.    Gastrointestinal: Negative.    Genitourinary: Negative.    Musculoskeletal: Negative.    Skin: Negative.    Neurological: Negative.    Endo/Heme/Allergies: Negative.    Psychiatric/Behavioral: Negative.           Objective:     /78 (BP Location: Left arm, Patient Position: Sitting, BP Cuff Size: Adult)   Pulse 76   Temp 36.3 °C (97.4 °F) (Temporal)   Ht 1.524 m (5')   Wt 59.6 kg (131 lb 6.4 oz)   SpO2 98%   BMI 25.66 kg/m²      Physical Exam  Vitals signs reviewed.    Constitutional:       General: She is not in acute distress.     Appearance: She is well-developed. She is not diaphoretic.   HENT:      Head: Normocephalic and atraumatic.      Right Ear: External ear normal.      Left Ear: External ear normal.      Nose: Nose normal.      Mouth/Throat:      Pharynx: No oropharyngeal exudate.   Eyes:      General: No scleral icterus.        Right eye: No discharge.         Left eye: No discharge.      Conjunctiva/sclera: Conjunctivae normal.      Pupils: Pupils are equal, round, and reactive to light.   Neck:      Musculoskeletal: Normal range of motion and neck supple.      Thyroid: No thyromegaly.      Vascular: No JVD.      Trachea: No tracheal deviation.   Cardiovascular:      Rate and Rhythm: Normal rate and regular rhythm.      Heart sounds: Normal heart sounds. No murmur. No friction rub. No gallop.    Pulmonary:      Effort: Pulmonary effort is normal. No respiratory distress.      Breath sounds: Normal breath sounds. No stridor. No wheezing or rales.   Chest:      Chest wall: No tenderness.   Abdominal:      General: Bowel sounds are normal. There is no distension.      Palpations: Abdomen is soft. There is no mass.      Tenderness: There is no abdominal tenderness. There is no guarding or rebound.   Musculoskeletal: Normal range of motion.         General: No tenderness.   Lymphadenopathy:      Cervical: No cervical adenopathy.   Skin:     General: Skin is warm and dry.      Coloration: Skin is not pale.      Findings: No erythema or rash.   Neurological:      Mental Status: She is alert and oriented to person, place, and time.      Cranial Nerves: No cranial nerve deficit.      Motor: No abnormal muscle tone.      Coordination: Coordination normal.      Deep Tendon Reflexes: Reflexes are normal and symmetric. Reflexes normal.   Psychiatric:         Behavior: Behavior normal.         Thought Content: Thought content normal.         Judgment: Judgment normal.        .lll  Lab Results   Component Value Date/Time    HBA1C 5.5 10/21/2020 08:40 AM     Lab Results   Component Value Date/Time    SODIUM 136 10/21/2020 08:40 AM    POTASSIUM 4.0 10/21/2020 08:40 AM    CHLORIDE 101 10/21/2020 08:40 AM    CO2 30 10/21/2020 08:40 AM    GLUCOSE 100 (H) 10/21/2020 08:40 AM    BUN 12 10/21/2020 08:40 AM    CREATININE 0.51 10/21/2020 08:40 AM    ALKPHOSPHAT 94 10/21/2020 08:40 AM    ASTSGOT 26 10/21/2020 08:40 AM    ALTSGPT 24 10/21/2020 08:40 AM    TBILIRUBIN 0.4 10/21/2020 08:40 AM     No results found for: INR  Lab Results   Component Value Date/Time    CHOLSTRLTOT 240 (H) 10/21/2020 08:40 AM     (H) 10/21/2020 08:40 AM    HDL 78 10/21/2020 08:40 AM    TRIGLYCERIDE 92 10/21/2020 08:40 AM       No results found for: TESTOSTERONE  No results found for: TSH  Lab Results   Component Value Date/Time    FREET4 0.95 06/10/2019 07:42 AM     No results found for: URICACID  No components found for: VITB12  Lab Results   Component Value Date/Time    25HYDROXY 71 10/21/2020 08:40 AM               Assessment/Plan:        1. Hypothyroidism, acquired, autoimmune  Under good control. Continue same regimen.        2. IFG (impaired fasting glucose)  Under good control. Continue same regimen.     - HEMOGLOBIN A1C; Future    3. Dyslipidemia  Under good control. Continue same regimen.     - TSH; Future  - Comp Metabolic Panel; Future  - Lipid Profile; Future  - CBC WITH DIFFERENTIAL; Future    4. Vitamin D deficiency      Under good control. Continue same regimen.    - VITAMIN D,25 HYDROXY; Future

## 2020-11-24 ENCOUNTER — TELEPHONE (OUTPATIENT)
Dept: MEDICAL GROUP | Age: 70
End: 2020-11-24

## 2020-11-24 NOTE — TELEPHONE ENCOUNTER
VOICEMAIL  1. Caller Name: Yaz Farooq                        Call Back Number: [phone] (home)       2. Message: States that she went to a drug store and that the pharmacist informed her that she needs to be seen in order to obtain a medication?     3. Patient approves office to leave a detailed voicemail/MyChart message: N\A      Phone Number Called: [phone] (home)       Call outcome: Did not leave a detailed message. Requested patient to call back.    Message: 1st attempt

## 2020-11-24 NOTE — TELEPHONE ENCOUNTER
Pt left voicemail stating that she canceled her appointment and that she appreciates us calling her back.

## 2020-11-24 NOTE — TELEPHONE ENCOUNTER
Phone Number Called: [phone]    Call outcome: Left detailed message for patient. Informed to call back with any additional questions.    Message: Requested call back regarding which medication she is attempting to fill there are no encounters suggesting any refill.

## 2021-01-15 DIAGNOSIS — Z23 NEED FOR VACCINATION: ICD-10-CM

## 2021-02-16 ENCOUNTER — OFFICE VISIT (OUTPATIENT)
Dept: URGENT CARE | Facility: CLINIC | Age: 71
End: 2021-02-16
Payer: MEDICARE

## 2021-02-16 ENCOUNTER — NURSE TRIAGE (OUTPATIENT)
Dept: HEALTH INFORMATION MANAGEMENT | Facility: OTHER | Age: 71
End: 2021-02-16

## 2021-02-16 VITALS
WEIGHT: 134 LBS | HEART RATE: 77 BPM | RESPIRATION RATE: 18 BRPM | DIASTOLIC BLOOD PRESSURE: 62 MMHG | OXYGEN SATURATION: 98 % | HEIGHT: 60 IN | BODY MASS INDEX: 26.31 KG/M2 | SYSTOLIC BLOOD PRESSURE: 132 MMHG | TEMPERATURE: 98.1 F

## 2021-02-16 DIAGNOSIS — Z71.89 COUNSELED ABOUT COVID-19 VIRUS INFECTION: ICD-10-CM

## 2021-02-16 DIAGNOSIS — H65.02 NON-RECURRENT ACUTE SEROUS OTITIS MEDIA OF LEFT EAR: ICD-10-CM

## 2021-02-16 DIAGNOSIS — J01.40 ACUTE NON-RECURRENT PANSINUSITIS: ICD-10-CM

## 2021-02-16 PROBLEM — R13.10 DYSPHAGIA: Status: ACTIVE | Noted: 2019-10-11

## 2021-02-16 PROCEDURE — 99214 OFFICE O/P EST MOD 30 MIN: CPT | Performed by: NURSE PRACTITIONER

## 2021-02-16 RX ORDER — AMOXICILLIN 500 MG/1
500 CAPSULE ORAL 2 TIMES DAILY
Qty: 20 CAPSULE | Refills: 0 | Status: SHIPPED | OUTPATIENT
Start: 2021-02-16 | End: 2021-02-26

## 2021-02-16 ASSESSMENT — ENCOUNTER SYMPTOMS
PHOTOPHOBIA: 0
DIARRHEA: 0
BLURRED VISION: 0
SORE THROAT: 0
VOMITING: 0
CHILLS: 0
ABDOMINAL PAIN: 0
MYALGIAS: 0
SINUS PAIN: 1
FEVER: 0
EYE DISCHARGE: 0
COUGH: 0
DOUBLE VISION: 0
NAUSEA: 0
HEADACHES: 1
EYE PAIN: 1
EYE REDNESS: 1

## 2021-02-16 ASSESSMENT — VISUAL ACUITY: OU: 1

## 2021-02-16 ASSESSMENT — FIBROSIS 4 INDEX: FIB4 SCORE: 1.74

## 2021-02-16 NOTE — PATIENT INSTRUCTIONS
Otitis Media, Adult    Otitis media occurs when there is inflammation and fluid in the middle ear. Your middle ear is a part of the ear that contains bones for hearing as well as air that helps send sounds to your brain.  What are the causes?  This condition is caused by a blockage in the eustachian tube. This tube drains fluid from the ear to the back of the nose (nasopharynx). A blockage in this tube can be caused by an object or by swelling (edema) in the tube. Problems that can cause a blockage include:  · A cold or other upper respiratory infection.  · Allergies.  · An irritant, such as tobacco smoke.  · Enlarged adenoids. The adenoids are areas of soft tissue located high in the back of the throat, behind the nose and the roof of the mouth.  · A mass in the nasopharynx.  · Damage to the ear caused by pressure changes (barotrauma).  What are the signs or symptoms?  Symptoms of this condition include:  · Ear pain.  · A fever.  · Decreased hearing.  · A headache.  · Tiredness (lethargy).  · Fluid leaking from the ear.  · Ringing in the ear.  How is this diagnosed?  This condition is diagnosed with a physical exam. During the exam your health care provider will use an instrument called an otoscope to look into your ear and check for redness, swelling, and fluid. He or she will also ask about your symptoms.  Your health care provider may also order tests, such as:  · A test to check the movement of the eardrum (pneumatic otoscopy). This test is done by squeezing a small amount of air into the ear.  · A test that changes air pressure in the middle ear to check how well the eardrum moves and whether the eustachian tube is working (tympanogram).  How is this treated?  This condition usually goes away on its own within 3-5 days. But if the condition is caused by a bacteria infection and does not go away own its own, or keeps coming back, your health care provider may:  · Prescribe antibiotic medicines to treat the  infection.  · Prescribe or recommend medicines to control pain.  Follow these instructions at home:  · Take over-the-counter and prescription medicines only as told by your health care provider.  · If you were prescribed an antibiotic medicine, take it as told by your health care provider. Do not stop taking the antibiotic even if you start to feel better.  · Keep all follow-up visits as told by your health care provider. This is important.  Contact a health care provider if:  · You have bleeding from your nose.  · There is a lump on your neck.  · You are not getting better in 5 days.  · You feel worse instead of better.  Get help right away if:  · You have severe pain that is not controlled with medicine.  · You have swelling, redness, or pain around your ear.  · You have stiffness in your neck.  · A part of your face is paralyzed.  · The bone behind your ear (mastoid) is tender when you touch it.  · You develop a severe headache.  Summary  · Otitis media is redness, soreness, and swelling of the middle ear.  · This condition usually goes away on its own within 3-5 days.  · If the problem does not go away in 3-5 days, your health care provider may prescribe or recommend medicines to treat your symptoms.  · If you were prescribed an antibiotic medicine, take it as told by your health care provider.  This information is not intended to replace advice given to you by your health care provider. Make sure you discuss any questions you have with your health care provider.  Document Released: 09/22/2005 Document Revised: 11/30/2018 Document Reviewed: 12/08/2017  Elsevier Patient Education © 2020 [employer]  Sinusitis, Adult  Sinusitis is inflammation of your sinuses. Sinuses are hollow spaces in the bones around your face. Your sinuses are located:  · Around your eyes.  · In the middle of your forehead.  · Behind your nose.  · In your cheekbones.  Mucus normally drains out of your sinuses. When your nasal tissues  become inflamed or swollen, mucus can become trapped or blocked. This allows bacteria, viruses, and fungi to grow, which leads to infection. Most infections of the sinuses are caused by a virus.  Sinusitis can develop quickly. It can last for up to 4 weeks (acute) or for more than 12 weeks (chronic). Sinusitis often develops after a cold.  What are the causes?  This condition is caused by anything that creates swelling in the sinuses or stops mucus from draining. This includes:  · Allergies.  · Asthma.  · Infection from bacteria or viruses.  · Deformities or blockages in your nose or sinuses.  · Abnormal growths in the nose (nasal polyps).  · Pollutants, such as chemicals or irritants in the air.  · Infection from fungi (rare).  What increases the risk?  You are more likely to develop this condition if you:  · Have a weak body defense system (immune system).  · Do a lot of swimming or diving.  · Overuse nasal sprays.  · Smoke.  What are the signs or symptoms?  The main symptoms of this condition are pain and a feeling of pressure around the affected sinuses. Other symptoms include:  · Stuffy nose or congestion.  · Thick drainage from your nose.  · Swelling and warmth over the affected sinuses.  · Headache.  · Upper toothache.  · A cough that may get worse at night.  · Extra mucus that collects in the throat or the back of the nose (postnasal drip).  · Decreased sense of smell and taste.  · Fatigue.  · A fever.  · Sore throat.  · Bad breath.  How is this diagnosed?  This condition is diagnosed based on:  · Your symptoms.  · Your medical history.  · A physical exam.  · Tests to find out if your condition is acute or chronic. This may include:  ? Checking your nose for nasal polyps.  ? Viewing your sinuses using a device that has a light (endoscope).  ? Testing for allergies or bacteria.  ? Imaging tests, such as an MRI or CT scan.  In rare cases, a bone biopsy may be done to rule out more serious types of fungal sinus  disease.  How is this treated?  Treatment for sinusitis depends on the cause and whether your condition is chronic or acute.  · If caused by a virus, your symptoms should go away on their own within 10 days. You may be given medicines to relieve symptoms. They include:  ? Medicines that shrink swollen nasal passages (topical intranasal decongestants).  ? Medicines that treat allergies (antihistamines).  ? A spray that eases inflammation of the nostrils (topical intranasal corticosteroids).  ? Rinses that help get rid of thick mucus in your nose (nasal saline washes).  · If caused by bacteria, your health care provider may recommend waiting to see if your symptoms improve. Most bacterial infections will get better without antibiotic medicine. You may be given antibiotics if you have:  ? A severe infection.  ? A weak immune system.  · If caused by narrow nasal passages or nasal polyps, you may need to have surgery.  Follow these instructions at home:  Medicines  · Take, use, or apply over-the-counter and prescription medicines only as told by your health care provider. These may include nasal sprays.  · If you were prescribed an antibiotic medicine, take it as told by your health care provider. Do not stop taking the antibiotic even if you start to feel better.  Hydrate and humidify    · Drink enough fluid to keep your urine pale yellow. Staying hydrated will help to thin your mucus.  · Use a cool mist humidifier to keep the humidity level in your home above 50%.  · Inhale steam for 10-15 minutes, 3-4 times a day, or as told by your health care provider. You can do this in the bathroom while a hot shower is running.  · Limit your exposure to cool or dry air.  Rest  · Rest as much as possible.  · Sleep with your head raised (elevated).  · Make sure you get enough sleep each night.  General instructions    · Apply a warm, moist washcloth to your face 3-4 times a day or as told by your health care provider. This will  help with discomfort.  · Wash your hands often with soap and water to reduce your exposure to germs. If soap and water are not available, use hand .  · Do not smoke. Avoid being around people who are smoking (secondhand smoke).  · Keep all follow-up visits as told by your health care provider. This is important.  Contact a health care provider if:  · You have a fever.  · Your symptoms get worse.  · Your symptoms do not improve within 10 days.  Get help right away if:  · You have a severe headache.  · You have persistent vomiting.  · You have severe pain or swelling around your face or eyes.  · You have vision problems.  · You develop confusion.  · Your neck is stiff.  · You have trouble breathing.  Summary  · Sinusitis is soreness and inflammation of your sinuses. Sinuses are hollow spaces in the bones around your face.  · This condition is caused by nasal tissues that become inflamed or swollen. The swelling traps or blocks the flow of mucus. This allows bacteria, viruses, and fungi to grow, which leads to infection.  · If you were prescribed an antibiotic medicine, take it as told by your health care provider. Do not stop taking the antibiotic even if you start to feel better.  · Keep all follow-up visits as told by your health care provider. This is important.  This information is not intended to replace advice given to you by your health care provider. Make sure you discuss any questions you have with your health care provider.  Document Released: 12/18/2006 Document Revised: 05/20/2019 Document Reviewed: 05/20/2019  Elsevier Patient Education © 2020 [employer]

## 2021-02-16 NOTE — TELEPHONE ENCOUNTER
Regarding: Left eye pain, left ear pain and sinus pain  ----- Message from Jojo Antunez sent at 2/16/2021  8:33 AM PST -----  Patient has left eye pain, left ear pain and sinus pain.

## 2021-02-16 NOTE — PROGRESS NOTES
Subjective:     Yaz Farooq is a 70 y.o. female who presents for Eye Pain (left sided eye/ear pain x4 days)      HPI  Pt presents for evaluation of a new problem. Yaz is a pleasant 70-year-old female presents to urgent care today with complaints of left-sided eye and ear pain.  She states that she does usually get a sinus infection around this time of the year that always affects the left side of her face.  Her pain is rated as moderate.  She has been using eyedrops prescribed by her physician for relief of her symptoms.  She does note ear drainage and swelling of the left side of her face.  She denies fever/chills/vomiting, abdominal pain, cough or sore throat.  She does have congestion and mild rhinorrhea with trace amounts of blood.  Her symptoms remain unchanged from onset of 4 days ago.   In addition, she is concerned about getting the Covid vaccine as she had a allergic reaction to Shingrix vaccine.   Review of Systems   Constitutional: Positive for malaise/fatigue. Negative for chills and fever.   HENT: Positive for congestion, ear pain, nosebleeds and sinus pain. Negative for sore throat.    Eyes: Positive for pain and redness. Negative for blurred vision, double vision, photophobia and discharge.   Respiratory: Negative for cough.    Gastrointestinal: Negative for abdominal pain, diarrhea, nausea and vomiting.   Musculoskeletal: Negative for myalgias.   Skin: Negative for rash.   Neurological: Positive for headaches.       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     ALLERGIES:   Allergies   Allergen Reactions   • Augmentin      Pain in the stomach  Other reaction(s): Abdominal Pain   • Compazine Shortness of Breath and Swelling     Other reaction(s): Respiratory Distress   • Amoxicillin-Clavulanic Acid [Amoxicillin-Pot Clavulanate]      Pain in stomach     SURGHX:   Past Surgical  History:   Procedure Laterality Date   • OTHER ORTHOPEDIC SURGERY       SOCHX:   Social History     Socioeconomic History   • Marital status:      Spouse name: Not on file   • Number of children: Not on file   • Years of education: Not on file   • Highest education level: Not on file   Occupational History   • 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   Tobacco Use   • Smoking status: Never Smoker   • Smokeless tobacco: Never Used   Substance and Sexual Activity   • Alcohol use: Yes     Comment: 3-4 GLASSES OF WINE A WEEK   • Drug use: No   • Sexual activity: Not on file   Other Topics Concern   • Not on file   Social History Narrative   • Not on file     Social Determinants of Health     Financial Resource Strain:    • Difficulty of Paying Living Expenses:    Food Insecurity:    • Worried About Running Out of Food in the Last Year:    • Ran Out of Food in the Last Year:    Transportation Needs:    • Lack of Transportation (Medical):    • Lack of Transportation (Non-Medical):    Physical Activity:    • Days of Exercise per Week:    • Minutes of Exercise per Session:    Stress:    • Feeling of Stress :    Social Connections:    • Frequency of Communication with Friends and Family:    • Frequency of Social Gatherings with Friends and Family:    • Attends [employer] Services:    • Active Member of Clubs or Organizations:    • Attends Club or Organization Meetings:    • Marital Status:    Intimate Partner Violence:    • Fear of Current or Ex-Partner:    • Emotionally Abused:    • Physically Abused:    • Sexually Abused:      FH:   Family History   Problem Relation Age of Onset   • Heart Disease Mother         MI   • Heart Disease Father 80        MI   • Cancer Sister         breast cancer   • Breast Cancer Sister          Objective:   /62 (BP Location: Right arm, Patient Position: Sitting)   Pulse 77   Temp 36.7 °C (98.1 °F) (Tympanic)   Resp 18   Ht 1.524 m (5')   Wt 60.8 kg (134 lb)   SpO2 98%   BMI 26.17 kg/m²      Physical Exam  Vitals and nursing note reviewed.   Constitutional:       General: She is not in acute distress.     Appearance: Normal appearance. She is ill-appearing.   HENT:      Head: Normocephalic and atraumatic.      Right Ear: Tympanic membrane, ear canal and external ear normal. There is no impacted cerumen.      Left Ear: External ear normal. There is no impacted cerumen. Tympanic membrane is injected. Tympanic membrane is not bulging.      Nose: Congestion and rhinorrhea present.      Right Turbinates: Enlarged and swollen.      Left Turbinates: Enlarged and swollen.      Left Sinus: Maxillary sinus tenderness and frontal sinus tenderness present.      Mouth/Throat:      Mouth: Mucous membranes are moist.      Pharynx: Uvula midline. Posterior oropharyngeal erythema present. No pharyngeal swelling, oropharyngeal exudate or uvula swelling.      Tonsils: No tonsillar exudate or tonsillar abscesses. 1+ on the right. 1+ on the left.      Comments: Positive for postnasal drip  Eyes:      General: Lids are normal. Lids are everted, no foreign bodies appreciated. Vision grossly intact. Gaze aligned appropriately. No visual field deficit.        Right eye: No discharge.         Left eye: No discharge.      Extraocular Movements: Extraocular movements intact.      Pupils: Pupils are equal, round, and reactive to light.      Comments: Positive for mild swelling of left eye.    Cardiovascular:      Rate and Rhythm: Normal rate and regular rhythm.      Pulses: Normal pulses.      Heart sounds: Normal heart sounds.   Pulmonary:      Effort: Pulmonary effort is normal. No respiratory distress.      Breath sounds: Normal breath sounds. No stridor. No wheezing, rhonchi or rales.   Chest:      Chest wall: No tenderness.   Abdominal:      General: Abdomen is flat. Bowel sounds are normal.      Palpations: Abdomen is soft.      Tenderness: There is no abdominal tenderness. There is no right CVA tenderness or left CVA  tenderness.   Musculoskeletal:         General: Normal range of motion.      Cervical back: Normal range of motion and neck supple. No tenderness.   Lymphadenopathy:      Cervical: No cervical adenopathy.   Skin:     General: Skin is warm and dry.      Capillary Refill: Capillary refill takes less than 2 seconds.   Neurological:      General: No focal deficit present.      Mental Status: She is alert and oriented to person, place, and time. Mental status is at baseline.   Psychiatric:         Mood and Affect: Mood normal.         Behavior: Behavior normal.         Thought Content: Thought content normal.         Judgment: Judgment normal.         Assessment/Plan:   Assessment    1. Non-recurrent acute serous otitis media of left ear  amoxicillin (AMOXIL) 500 MG Cap   2. Acute non-recurrent pansinusitis     3. Counseled about COVID-19 virus infection         She declines Covid testing today.  We discussed in length the pros and cons of receiving data Covid vaccine.  Questions/concerns addressed.  We discussed supportive measures including humidifier, warm salt, warm facial compresses, Flonase nasal spray in each nare daily for 1 week, rest  and increased fluids. Pt was encouraged to seek treatment back in the ER or urgent care for worsening symptoms,  fever greater than 100.5, wheezes or shortness of breath.  Time spent evaluating this patient was at least 30 minutes and includes preparing for visit, counseling/education, exam and evaluation, obtaining history, independent interpretation and ordering labs/tests/procedures.     AVS handout given and reviewed with patient. Pt educated on red flags and when to seek treatment back in ER or UC.

## 2021-02-16 NOTE — TELEPHONE ENCOUNTER
"Pt wears mask and believes that he breathing in her mask is causing air to go into her eye and cause an eye infection. Pt has clear discharge, Left eye pain and blurry vision, water behind Left ear, post nasal drip and nasal congestion. Pt would like to see PCP. Per protocol, with symptoms she would not be advised to go into office. Instead advised UC. Pt upset that she cannot go into office. No MyChart to do VV at this time. Made UC appt for pt.    Reason for Disposition  • Eye pain/discomfort and more than mild    Additional Information  • Negative: Followed an eye injury  • Negative: Eye pain from chemical in the eye  • Negative: Eye pain from foreign body in eye  • Negative: Has sinus pain or pressure  • Negative: Severe eye pain  • Negative: Complete loss of vision in one or both eyes  • Negative: Eyelids are very swollen (shut or almost) and fever  • Negative: Eyelid (outer) is very red and fever  • Negative: Foreign body sensation ('feels like something is in there') and irrigation didn't help  • Negative: Vomiting  • Negative: Ulcer or sore seen on the cornea (clear center part of the eye)  • Negative: Recent eye surgery and increasing eye pain  • Negative: Blurred vision and new or worsening  • Negative: Patient sounds very sick or weak to the triager    Answer Assessment - Initial Assessment Questions  1. ONSET: \"When did the pain start?\" (e.g., minutes, hours, days)      \"Eventually\"  2. TIMING: \"Does the pain come and go, or has it been constant since it started?\" (e.g., constant, intermittent, fleeting)      constant  3. SEVERITY: \"How bad is the pain?\"   (Scale 1-10; mild, moderate or severe)    - MILD (1-3): doesn't interfere with normal activities     - MODERATE (4-7): interferes with normal activities or awakens from sleep     - SEVERE (8-10): excruciating pain and patient unable to do normal activities      Moderate, has discharge and blurry vision  4. LOCATION: \"Where does it hurt?\"  (e.g., " "eyelid, eye, cheekbone)      Left side  5. CAUSE: \"What do you think is causing the pain?\"      Sinus infection?  6. VISION: \"Do you have blurred vision or changes in your vision?\"       Blurred vision  7. EYE DISCHARGE: \"Is there any discharge (pus) from the eye(s)?\"  If yes, ask: \"What color is it?\"       clear  8. FEVER: \"Do you have a fever?\" If so, ask: \"What is it, how was it measured, and when did it start?\"       no  9. OTHER SYMPTOMS: \"Do you have any other symptoms?\" (e.g., headache, nasal discharge, facial rash)      Post nasal drip left side, L ear  10. PREGNANCY: \"Is there any chance you are pregnant?\" \"When was your last menstrual period?\"        no    Protocols used: EYE PAIN-A-[facility]    "

## 2021-03-10 ENCOUNTER — HOSPITAL ENCOUNTER (OUTPATIENT)
Dept: RADIOLOGY | Facility: MEDICAL CENTER | Age: 71
End: 2021-03-10
Attending: OBSTETRICS & GYNECOLOGY
Payer: MEDICARE

## 2021-03-10 DIAGNOSIS — Z12.31 ENCOUNTER FOR MAMMOGRAM TO ESTABLISH BASELINE MAMMOGRAM: ICD-10-CM

## 2021-03-10 PROCEDURE — 77063 BREAST TOMOSYNTHESIS BI: CPT

## 2021-04-26 ENCOUNTER — HOSPITAL ENCOUNTER (OUTPATIENT)
Dept: LAB | Facility: MEDICAL CENTER | Age: 71
End: 2021-04-26
Attending: GENERAL PRACTICE
Payer: MEDICARE

## 2021-04-26 LAB — TSH SERPL DL<=0.005 MIU/L-ACNC: 1.59 UIU/ML (ref 0.38–5.33)

## 2021-04-26 PROCEDURE — 36415 COLL VENOUS BLD VENIPUNCTURE: CPT

## 2021-04-26 PROCEDURE — 84443 ASSAY THYROID STIM HORMONE: CPT

## 2021-05-13 ENCOUNTER — OFFICE VISIT (OUTPATIENT)
Dept: MEDICAL GROUP | Age: 71
End: 2021-05-13
Payer: MEDICARE

## 2021-05-13 VITALS
DIASTOLIC BLOOD PRESSURE: 72 MMHG | BODY MASS INDEX: 26 KG/M2 | HEIGHT: 60 IN | OXYGEN SATURATION: 96 % | HEART RATE: 70 BPM | SYSTOLIC BLOOD PRESSURE: 120 MMHG | WEIGHT: 132.4 LBS | TEMPERATURE: 98.1 F

## 2021-05-13 DIAGNOSIS — R73.01 IFG (IMPAIRED FASTING GLUCOSE): ICD-10-CM

## 2021-05-13 DIAGNOSIS — E06.3 HYPOTHYROIDISM, ACQUIRED, AUTOIMMUNE: ICD-10-CM

## 2021-05-13 DIAGNOSIS — K21.00 GASTROESOPHAGEAL REFLUX DISEASE WITH ESOPHAGITIS, UNSPECIFIED WHETHER HEMORRHAGE: ICD-10-CM

## 2021-05-13 DIAGNOSIS — H92.09 OTALGIA, UNSPECIFIED LATERALITY: ICD-10-CM

## 2021-05-13 PROCEDURE — 99214 OFFICE O/P EST MOD 30 MIN: CPT | Performed by: INTERNAL MEDICINE

## 2021-05-13 RX ORDER — ASPIRIN 81 MG/1
TABLET ORAL
COMMUNITY
End: 2021-05-13

## 2021-05-13 ASSESSMENT — ENCOUNTER SYMPTOMS
RESPIRATORY NEGATIVE: 1
MUSCULOSKELETAL NEGATIVE: 1
GASTROINTESTINAL NEGATIVE: 1
PSYCHIATRIC NEGATIVE: 1
CONSTITUTIONAL NEGATIVE: 1
CARDIOVASCULAR NEGATIVE: 1
EYES NEGATIVE: 1
NEUROLOGICAL NEGATIVE: 1

## 2021-05-13 ASSESSMENT — PATIENT HEALTH QUESTIONNAIRE - PHQ9: CLINICAL INTERPRETATION OF PHQ2 SCORE: 0

## 2021-05-13 ASSESSMENT — FIBROSIS 4 INDEX: FIB4 SCORE: 1.74

## 2021-05-13 NOTE — PROGRESS NOTES
Subjective:      Yaz Farooq is a 70 y.o. female who presents with Seasonal Allergies, Otalgia (left ear on and off x1-2wks ), and Paperwork (DMV )  The patient is here for followup of chronic medical problems listed below. The patient is compliant with medications and having no side effects from them. Denies chest pain, abdominal pain, dyspnea, myalgias, or cough.   Patient Active Problem List    Diagnosis Date Noted   • IFG (impaired fasting glucose) 08/13/2020   • Environmental and seasonal allergies 08/13/2020   • Allergic conjunctivitis of both eyes 08/13/2020   • Hypothyroidism, acquired, autoimmune 06/08/2020   • Grieving 05/20/2020   • GERD (gastroesophageal reflux disease) 05/20/2020   • Dysphagia 10/11/2019     Allergies   Allergen Reactions   • Augmentin      Pain in the stomach  Other reaction(s): Abdominal Pain   • Compazine Shortness of Breath and Swelling     Other reaction(s): Respiratory Distress   • Amoxicillin-Clavulanic Acid [Amoxicillin-Pot Clavulanate]      Pain in stomach     I have discontinued Yaz Farooq's aspirin. I am also having her maintain her Naphcon-A, Thyroid, and thyroid.            HPI    Review of Systems   Constitutional: Negative.    HENT: Negative.    Eyes: Negative.    Respiratory: Negative.    Cardiovascular: Negative.    Gastrointestinal: Negative.    Genitourinary: Negative.    Musculoskeletal: Negative.    Skin: Negative.    Neurological: Negative.    Endo/Heme/Allergies: Negative.    Psychiatric/Behavioral: Negative.           Objective:     /72 (BP Location: Left arm, Patient Position: Sitting, BP Cuff Size: Adult)   Pulse 70   Temp 36.7 °C (98.1 °F) (Temporal)   Ht 1.524 m (5')   Wt 60.1 kg (132 lb 6.4 oz)   SpO2 96%   BMI 25.86 kg/m²      Physical Exam  Vitals reviewed.   Constitutional:       General: She is not in acute distress.     Appearance: She is well-developed. She is not diaphoretic.   HENT:      Head: Normocephalic and atraumatic.       Right Ear: External ear normal.      Left Ear: External ear normal.      Nose: Nose normal.      Mouth/Throat:      Pharynx: No oropharyngeal exudate.   Eyes:      General: No scleral icterus.        Right eye: No discharge.         Left eye: No discharge.      Conjunctiva/sclera: Conjunctivae normal.      Pupils: Pupils are equal, round, and reactive to light.   Neck:      Thyroid: No thyromegaly.      Vascular: No JVD.      Trachea: No tracheal deviation.   Cardiovascular:      Rate and Rhythm: Normal rate and regular rhythm.      Heart sounds: Normal heart sounds. No murmur heard.   No friction rub. No gallop.    Pulmonary:      Effort: Pulmonary effort is normal. No respiratory distress.      Breath sounds: Normal breath sounds. No stridor. No wheezing or rales.   Chest:      Chest wall: No tenderness.   Abdominal:      General: Bowel sounds are normal. There is no distension.      Palpations: Abdomen is soft. There is no mass.      Tenderness: There is no abdominal tenderness. There is no guarding or rebound.   Musculoskeletal:         General: No tenderness. Normal range of motion.      Cervical back: Normal range of motion and neck supple.   Lymphadenopathy:      Cervical: No cervical adenopathy.   Skin:     General: Skin is warm and dry.      Coloration: Skin is not pale.      Findings: No erythema or rash.   Neurological:      Mental Status: She is alert and oriented to person, place, and time.      Cranial Nerves: No cranial nerve deficit.      Motor: No abnormal muscle tone.      Coordination: Coordination normal.      Deep Tendon Reflexes: Reflexes are normal and symmetric. Reflexes normal.   Psychiatric:         Behavior: Behavior normal.         Thought Content: Thought content normal.         Judgment: Judgment normal.                 Hospital Outpatient Visit on 04/26/2021   Component Date Value   • TSH 04/26/2021 1.590       Lab Results   Component Value Date/Time    HBA1C 5.5 10/21/2020 08:40  AM     Lab Results   Component Value Date/Time    SODIUM 136 10/21/2020 08:40 AM    POTASSIUM 4.0 10/21/2020 08:40 AM    CHLORIDE 101 10/21/2020 08:40 AM    CO2 30 10/21/2020 08:40 AM    GLUCOSE 100 (H) 10/21/2020 08:40 AM    BUN 12 10/21/2020 08:40 AM    CREATININE 0.51 10/21/2020 08:40 AM    ALKPHOSPHAT 94 10/21/2020 08:40 AM    ASTSGOT 26 10/21/2020 08:40 AM    ALTSGPT 24 10/21/2020 08:40 AM    TBILIRUBIN 0.4 10/21/2020 08:40 AM     No results found for: INR  Lab Results   Component Value Date/Time    CHOLSTRLTOT 240 (H) 10/21/2020 08:40 AM     (H) 10/21/2020 08:40 AM    HDL 78 10/21/2020 08:40 AM    TRIGLYCERIDE 92 10/21/2020 08:40 AM       No results found for: TESTOSTERONE  No results found for: TSH  Lab Results   Component Value Date/Time    FREET4 0.95 06/10/2019 07:42 AM     No results found for: URICACID  No components found for: VITB12  Lab Results   Component Value Date/Time    25HYDROXY 71 10/21/2020 08:40 AM            Assessment/Plan:        1. IFG (impaired fasting glucose)    Under good control. Continue same regimen    2. Hypothyroidism, acquired, autoimmune     Under good control. Continue same regimen    3. Gastroesophageal reflux disease with esophagitis, unspecified whether hemorrhage     Under good control. Continue same regimen  4. Otalgia, unspecified laterality   .  Exam normal.  Unclear etiology of your complaints but it is totally normal exam.    DMV paperwork filled out for patient.  She is cleared to drive and has no restrictions.  Her eye exam will be redone by her ophthalmologist who filled out incorrectly stating that her eye exam was done with corrective lenses when really she does not wear glasses.    Long talk with patient regarding the necessity of an importance of getting COVID-19 vaccine.  She has a lot of hesitancy is worried about the side effects long-term of the  vaccine.  Reassured there have been no long-term side effects noted to date nor the we anticipate any.   Explained the risk of long-term side effects from the virus far outweigh the risk of any potential side effects from the vaccine.  She seems more inclined not to get the vaccine.

## 2021-06-22 ENCOUNTER — TELEPHONE (OUTPATIENT)
Dept: MEDICAL GROUP | Age: 71
End: 2021-06-22

## 2021-06-22 NOTE — TELEPHONE ENCOUNTER
Phone Number Called: [phone] (home) [phone] (work)      Call outcome: Did not leave a detailed message. Requested patient to call back.    Message: Attempt #1 to relay Dr Meza's message.

## 2021-06-22 NOTE — TELEPHONE ENCOUNTER
Pt is concerned about getting the covid vaccine she states that she had an allergic reaction to shingle vaccine she states that she developed a rash after the vaccination. She just wants to make sure that it would be safe for her to get the covid vaccine? And if you recommend her getting it from [facility] or if a drug store pharmacy would be okay?

## 2021-06-22 NOTE — TELEPHONE ENCOUNTER
Yes.  It is safe for the patient get the Covid vaccine and it is very important that she do so.  She can get it either at [facility] or the pharmacy.  Either 1 is acceptable.  Which ever place she can get it done the soonest as best.  Please call the patient to reassure her and encourage her.

## 2021-08-24 ENCOUNTER — APPOINTMENT (RX ONLY)
Dept: URBAN - METROPOLITAN AREA CLINIC 4 | Facility: CLINIC | Age: 71
Setting detail: DERMATOLOGY
End: 2021-08-24

## 2021-08-24 DIAGNOSIS — D22 MELANOCYTIC NEVI: ICD-10-CM

## 2021-08-24 DIAGNOSIS — D18.0 HEMANGIOMA: ICD-10-CM

## 2021-08-24 DIAGNOSIS — L81.4 OTHER MELANIN HYPERPIGMENTATION: ICD-10-CM

## 2021-08-24 DIAGNOSIS — L65.9 NONSCARRING HAIR LOSS, UNSPECIFIED: ICD-10-CM

## 2021-08-24 DIAGNOSIS — L82.1 OTHER SEBORRHEIC KERATOSIS: ICD-10-CM

## 2021-08-24 DIAGNOSIS — Z71.89 OTHER SPECIFIED COUNSELING: ICD-10-CM

## 2021-08-24 PROBLEM — D22.9 MELANOCYTIC NEVI, UNSPECIFIED: Status: ACTIVE | Noted: 2021-08-24

## 2021-08-24 PROBLEM — D18.01 HEMANGIOMA OF SKIN AND SUBCUTANEOUS TISSUE: Status: ACTIVE | Noted: 2021-08-24

## 2021-08-24 PROCEDURE — ? COUNSELING

## 2021-08-24 PROCEDURE — 99203 OFFICE O/P NEW LOW 30 MIN: CPT

## 2021-08-24 PROCEDURE — ? SUNSCREEN RECOMMENDATIONS

## 2021-08-24 ASSESSMENT — LOCATION DETAILED DESCRIPTION DERM: LOCATION DETAILED: RIGHT SUPERIOR MEDIAL FOREHEAD

## 2021-08-24 ASSESSMENT — LOCATION ZONE DERM: LOCATION ZONE: FACE

## 2021-08-24 ASSESSMENT — LOCATION SIMPLE DESCRIPTION DERM: LOCATION SIMPLE: RIGHT FOREHEAD

## 2021-08-24 NOTE — PROCEDURE: COUNSELING
Detail Level: Zone
Detail Level: Detailed
Patient Specific Counseling (Will Not Stick From Patient To Patient): Patient has been seeing Dr for her blood work and is having thyroid issues and is now on armor medication. Patient returns for follow up in October.

## 2021-08-24 NOTE — PROCEDURE: MIPS QUALITY
Quality 130: Documentation Of Current Medications In The Medical Record: Current Medications Documented
Quality 226: Preventive Care And Screening: Tobacco Use: Screening And Cessation Intervention: Patient screened for tobacco use and is an ex/non-smoker
Quality 431: Preventive Care And Screening: Unhealthy Alcohol Use - Screening: Patient screened for unhealthy alcohol use using a single question and scores less than 2 times per year
Detail Level: Detailed
Quality 110: Preventive Care And Screening: Influenza Immunization: Influenza immunization was not ordered or administered, reason not given
Quality 111:Pneumonia Vaccination Status For Older Adults: Pneumococcal Vaccination Previously Received

## 2021-09-14 ENCOUNTER — HOSPITAL ENCOUNTER (OUTPATIENT)
Dept: LAB | Facility: MEDICAL CENTER | Age: 71
End: 2021-09-14
Attending: GENERAL PRACTICE
Payer: MEDICARE

## 2021-09-14 LAB
T4 FREE SERPL-MCNC: 0.81 NG/DL (ref 0.93–1.7)
TSH SERPL DL<=0.005 MIU/L-ACNC: 1.16 UIU/ML (ref 0.38–5.33)

## 2021-09-14 PROCEDURE — 84480 ASSAY TRIIODOTHYRONINE (T3): CPT

## 2021-09-14 PROCEDURE — 84443 ASSAY THYROID STIM HORMONE: CPT

## 2021-09-14 PROCEDURE — 84439 ASSAY OF FREE THYROXINE: CPT

## 2021-09-14 PROCEDURE — 84481 FREE ASSAY (FT-3): CPT

## 2021-09-14 PROCEDURE — 84436 ASSAY OF TOTAL THYROXINE: CPT

## 2021-09-14 PROCEDURE — 36415 COLL VENOUS BLD VENIPUNCTURE: CPT

## 2021-09-15 LAB
T3 SERPL-MCNC: 104 NG/DL (ref 60–181)
T3FREE SERPL-MCNC: 2.76 PG/ML (ref 2–4.4)
T4 SERPL-MCNC: 5 UG/DL (ref 4–12)

## 2021-11-17 ENCOUNTER — HOSPITAL ENCOUNTER (OUTPATIENT)
Dept: LAB | Facility: MEDICAL CENTER | Age: 71
End: 2021-11-17
Attending: INTERNAL MEDICINE
Payer: MEDICARE

## 2021-11-17 DIAGNOSIS — E55.9 VITAMIN D DEFICIENCY: ICD-10-CM

## 2021-11-17 DIAGNOSIS — E78.5 DYSLIPIDEMIA: ICD-10-CM

## 2021-11-17 DIAGNOSIS — R73.01 IFG (IMPAIRED FASTING GLUCOSE): ICD-10-CM

## 2021-11-17 LAB
ALBUMIN SERPL BCP-MCNC: 4.3 G/DL (ref 3.2–4.9)
ALBUMIN/GLOB SERPL: 1.4 G/DL
ALP SERPL-CCNC: 82 U/L (ref 30–99)
ALT SERPL-CCNC: 18 U/L (ref 2–50)
ANION GAP SERPL CALC-SCNC: 9 MMOL/L (ref 7–16)
AST SERPL-CCNC: 24 U/L (ref 12–45)
BASOPHILS # BLD AUTO: 0.4 % (ref 0–1.8)
BASOPHILS # BLD: 0.02 K/UL (ref 0–0.12)
BILIRUB SERPL-MCNC: 0.4 MG/DL (ref 0.1–1.5)
BUN SERPL-MCNC: 11 MG/DL (ref 8–22)
CALCIUM SERPL-MCNC: 9.6 MG/DL (ref 8.4–10.2)
CHLORIDE SERPL-SCNC: 103 MMOL/L (ref 96–112)
CHOLEST SERPL-MCNC: 206 MG/DL (ref 100–199)
CO2 SERPL-SCNC: 27 MMOL/L (ref 20–33)
CREAT SERPL-MCNC: 0.48 MG/DL (ref 0.5–1.4)
EOSINOPHIL # BLD AUTO: 0.15 K/UL (ref 0–0.51)
EOSINOPHIL NFR BLD: 2.8 % (ref 0–6.9)
ERYTHROCYTE [DISTWIDTH] IN BLOOD BY AUTOMATED COUNT: 44.9 FL (ref 35.9–50)
EST. AVERAGE GLUCOSE BLD GHB EST-MCNC: 108 MG/DL
FASTING STATUS PATIENT QL REPORTED: NORMAL
GLOBULIN SER CALC-MCNC: 3 G/DL (ref 1.9–3.5)
GLUCOSE SERPL-MCNC: 97 MG/DL (ref 65–99)
HBA1C MFR BLD: 5.4 % (ref 4–5.6)
HCT VFR BLD AUTO: 44.1 % (ref 37–47)
HDLC SERPL-MCNC: 76 MG/DL
HGB BLD-MCNC: 14.6 G/DL (ref 12–16)
IMM GRANULOCYTES # BLD AUTO: 0.03 K/UL (ref 0–0.11)
IMM GRANULOCYTES NFR BLD AUTO: 0.6 % (ref 0–0.9)
LDLC SERPL CALC-MCNC: 104 MG/DL
LYMPHOCYTES # BLD AUTO: 1.74 K/UL (ref 1–4.8)
LYMPHOCYTES NFR BLD: 32.8 % (ref 22–41)
MCH RBC QN AUTO: 32.5 PG (ref 27–33)
MCHC RBC AUTO-ENTMCNC: 33.1 G/DL (ref 33.6–35)
MCV RBC AUTO: 98.2 FL (ref 81.4–97.8)
MONOCYTES # BLD AUTO: 0.53 K/UL (ref 0–0.85)
MONOCYTES NFR BLD AUTO: 10 % (ref 0–13.4)
NEUTROPHILS # BLD AUTO: 2.84 K/UL (ref 2–7.15)
NEUTROPHILS NFR BLD: 53.4 % (ref 44–72)
NRBC # BLD AUTO: 0 K/UL
NRBC BLD-RTO: 0 /100 WBC
PLATELET # BLD AUTO: 216 K/UL (ref 164–446)
PMV BLD AUTO: 11.1 FL (ref 9–12.9)
POTASSIUM SERPL-SCNC: 4.6 MMOL/L (ref 3.6–5.5)
PROT SERPL-MCNC: 7.3 G/DL (ref 6–8.2)
RBC # BLD AUTO: 4.49 M/UL (ref 4.2–5.4)
SODIUM SERPL-SCNC: 139 MMOL/L (ref 135–145)
TRIGL SERPL-MCNC: 128 MG/DL (ref 0–149)
WBC # BLD AUTO: 5.3 K/UL (ref 4.8–10.8)

## 2021-11-17 PROCEDURE — 82306 VITAMIN D 25 HYDROXY: CPT

## 2021-11-17 PROCEDURE — 80053 COMPREHEN METABOLIC PANEL: CPT

## 2021-11-17 PROCEDURE — 80061 LIPID PANEL: CPT

## 2021-11-17 PROCEDURE — 83036 HEMOGLOBIN GLYCOSYLATED A1C: CPT

## 2021-11-17 PROCEDURE — 85025 COMPLETE CBC W/AUTO DIFF WBC: CPT

## 2021-11-17 PROCEDURE — 36415 COLL VENOUS BLD VENIPUNCTURE: CPT

## 2021-11-19 LAB — 25(OH)D3 SERPL-MCNC: 70 NG/ML (ref 30–80)

## 2021-11-29 ENCOUNTER — OFFICE VISIT (OUTPATIENT)
Dept: MEDICAL GROUP | Age: 71
End: 2021-11-29
Payer: MEDICARE

## 2021-11-29 VITALS
OXYGEN SATURATION: 98 % | SYSTOLIC BLOOD PRESSURE: 118 MMHG | BODY MASS INDEX: 26.19 KG/M2 | TEMPERATURE: 98.2 F | HEIGHT: 60 IN | WEIGHT: 133.4 LBS | HEART RATE: 81 BPM | DIASTOLIC BLOOD PRESSURE: 68 MMHG

## 2021-11-29 DIAGNOSIS — F51.01 PRIMARY INSOMNIA: ICD-10-CM

## 2021-11-29 DIAGNOSIS — R73.01 IFG (IMPAIRED FASTING GLUCOSE): ICD-10-CM

## 2021-11-29 DIAGNOSIS — Z00.00 MEDICARE ANNUAL WELLNESS VISIT, SUBSEQUENT: ICD-10-CM

## 2021-11-29 DIAGNOSIS — E78.5 DYSLIPIDEMIA: ICD-10-CM

## 2021-11-29 DIAGNOSIS — E06.3 HYPOTHYROIDISM, ACQUIRED, AUTOIMMUNE: ICD-10-CM

## 2021-11-29 DIAGNOSIS — M25.562 ACUTE PAIN OF LEFT KNEE: ICD-10-CM

## 2021-11-29 DIAGNOSIS — E55.9 VITAMIN D DEFICIENCY: ICD-10-CM

## 2021-11-29 DIAGNOSIS — J01.00 ACUTE MAXILLARY SINUSITIS, RECURRENCE NOT SPECIFIED: ICD-10-CM

## 2021-11-29 PROCEDURE — G0439 PPPS, SUBSEQ VISIT: HCPCS | Performed by: INTERNAL MEDICINE

## 2021-11-29 PROCEDURE — 99213 OFFICE O/P EST LOW 20 MIN: CPT | Mod: 25 | Performed by: INTERNAL MEDICINE

## 2021-11-29 RX ORDER — AZITHROMYCIN 250 MG/1
TABLET, FILM COATED ORAL
Qty: 6 TABLET | Refills: 1 | Status: SHIPPED
Start: 2021-11-29 | End: 2022-07-12

## 2021-11-29 ASSESSMENT — ENCOUNTER SYMPTOMS: GENERAL WELL-BEING: GOOD

## 2021-11-29 ASSESSMENT — ACTIVITIES OF DAILY LIVING (ADL): BATHING_REQUIRES_ASSISTANCE: 0

## 2021-11-29 ASSESSMENT — PATIENT HEALTH QUESTIONNAIRE - PHQ9: CLINICAL INTERPRETATION OF PHQ2 SCORE: 0

## 2021-11-29 ASSESSMENT — FIBROSIS 4 INDEX: FIB4 SCORE: 1.885618083164126731

## 2021-11-29 NOTE — PROGRESS NOTES
Chief Complaint   Patient presents with   • Medicare Annual Wellness        HPI:  Yaz is a 71 y.o. here for Medicare Annual Wellness Visit          Patient Active Problem List    Diagnosis Date Noted   • IFG (impaired fasting glucose) 08/13/2020   • Environmental and seasonal allergies 08/13/2020   • Allergic conjunctivitis of both eyes 08/13/2020   • Hypothyroidism, acquired, autoimmune 06/08/2020   • Grieving 05/20/2020   • GERD (gastroesophageal reflux disease) 05/20/2020   • Dysphagia 10/11/2019       Current Outpatient Medications   Medication Sig Dispense Refill   • naphazoline-pheniramine (NAPHCON-A) 0.025-0.3 % ophthalmic solution Place 1 Drop in both eyes 4 times a day. 5 mL 4   • Thyroid 16.25 MG Tab Take 1 Tab by mouth every day. In addition to 65 mg thyroid pill 90 Tab 4   • thyroid (WP THYROID) 65 MG tablet Take 1 Tab by mouth every day. In addition to 16.25 mg thyroid pill 90 Tab 4     No current facility-administered medications for this visit.        Patient is taking medications as noted in medication list.  Current supplements as per medication list.     Allergies: Augmentin, Compazine, and Amoxicillin-clavulanic acid [amoxicillin-pot clavulanate]    Current social contact/activities: Volunteer in the community; lunch with friends; visits with family.      Is patient current with immunizations? Yes.    She  reports that she has never smoked. She has never used smokeless tobacco. She reports current alcohol use of about 1.8 - 2.4 oz of alcohol per week. She reports that she does not use drugs.  Counseling given: Not Answered        DPA/Advanced directive: Patient does not have an Advanced Directive.  A packet and workshop information was given on Advanced Directives.    ROS:    Gait: Uses no assistive device    Ostomy: No    Other tubes: No    Amputations: No    Chronic oxygen use No    Last eye exam 09/2021    Wears hearing aids: No    : Denies any urinary leakage during the last 6 months        Screening:         Depression Screening    Little interest or pleasure in doing things?  0 - not at all  Feeling down, depressed, or hopeless? 0 - not at all  Patient Health Questionnaire Score: 0    If depressive symptoms identified deferred to follow up visit unless specifically addressed in assessment and plan.    Interpretation of PHQ-9 Total Score   Score Severity   1-4 No Depression   5-9 Mild Depression   10-14 Moderate Depression   15-19 Moderately Severe Depression   20-27 Severe Depression    Screening for Cognitive Impairment    Three Minute Recall (captain, garden, picture)  3/3    Dario clock face with all 12 numbers and set the hands to show 5 past 8.  Yes 5/5  If cognitive concerns identified, deferred for follow up unless specifically addressed in assessment and plan.    Fall Risk Assessment    Has the patient had two or more falls in the last year or any fall with injury in the last year?  No  If fall risk identified, deferred for follow up unless specifically addressed in assessment and plan.    Safety Assessment    Throw rugs on floor.  Yes  Handrails on all stairs.  Yes  Good lighting in all hallways.  Yes  Difficulty hearing.  No  Patient counseled about all safety risks that were identified.    Functional Assessment ADLs    Are there any barriers preventing you from cooking for yourself or meeting nutritional needs?  No.    Are there any barriers preventing you from driving safely or obtaining transportation?  No.    Are there any barriers preventing you from using a telephone or calling for help?  No.    Are there any barriers preventing you from shopping?  No.    Are there any barriers preventing you from taking care of your own finances?  No.    Are there any barriers preventing you from managing your medications?  No.    Are there any barriers preventing you from showering, bathing or dressing yourself?  No.    Are you currently engaging in any exercise or physical activity?  Yes.  Walk  everyday; stretches everyday at home  What is your perception of your health?  Good.    Health Maintenance Summary          Overdue - BONE DENSITY (Every 5 Years) Overdue since 11/7/2016 11/07/2011  DS-BONE DENSITY STUDY (DEXA)          COVID-19 Vaccine (3 - Booster for Pfizer series) Next due on 1/28/2022 07/28/2021  Imm Admin: Pfizer SARS-CoV-2 Vaccine    07/07/2021  Imm Admin: Pfizer SARS-CoV-2 Vaccine          MAMMOGRAM (Yearly) Tentatively due on 3/10/2022    03/10/2021  MA-SCREENING MAMMO BILAT W/TOMOSYNTHESIS W/CAD    03/05/2020  MA-SCREENING MAMMO BILAT W/TOMOSYNTHESIS W/CAD    03/04/2019  MA-SCREENING MAMMO BILAT W/TOMOSYNTHESIS W/CAD    03/01/2018  MA-MAMMO SCREENING BILAT W/STEPHY W/CAD    02/28/2017  MA-MAMMO SCREENING BILAT W/STEPHY W/CAD    Only the first 5 history entries have been loaded, but more history exists.          COLORECTAL CANCER SCREENING (COLONOSCOPY - Every 10 Years) Next due on 10/9/2027    10/09/2017  COLONOSCOPY (Done)          IMM DTaP/Tdap/Td Vaccine (2 - Td or Tdap) Next due on 6/13/2029 06/13/2019  Imm Admin: Tdap Vaccine          IMM PNEUMOCOCCAL VACCINE: 65+ Years (Series Information) Completed    10/02/2017  Imm Admin: Pneumococcal polysaccharide vaccine (PPSV-23)    09/28/2016  Imm Admin: Pneumococcal Conjugate Vaccine (Prevnar/PCV-13)          HEPATITIS C SCREENING  Completed    10/21/2020  HEP C VIRUS ANTIBODY          IMM INFLUENZA (Series Information) Completed    10/15/2021  Imm Admin: Influenza, Unspecified - HISTORICAL DATA    10/05/2020  Imm Admin: Influenza, Unspecified - HISTORICAL DATA    10/04/2018  Imm Admin: Influenza, Unspecified - HISTORICAL DATA    09/22/2017  Imm Admin: Influenza Vaccine Quad Inj (Pf)    10/19/2015  Imm Admin: Influenza (IM) Preservative Free - HISTORICAL DATA          IMM HEP B VACCINE (Series Information) Aged Out    No completion history exists for this topic.          IMM MENINGOCOCCAL VACCINE (MCV4) (Series Information) Aged Out     No completion history exists for this topic.          Discontinued - PAP SMEAR  Discontinued    No completion history exists for this topic.          Discontinued - IMM ZOSTER VACCINES  Discontinued    10/04/2019  Imm Admin: Zoster Vaccine Recombinant (RZV) (SHINGRIX)    02/10/2015  Imm Admin: Zoster Vaccine Live (ZVL) (Zostavax) - HISTORICAL DATA                Patient Care Team:  Keon Meza M.D. as PCP - General (Internal Medicine)  Claude K Lardinois, M.D. as Consulting Physician (Endocrinology)  Jose Abrams M.D. as Attending Team Physician (Internal Medicine)    Social History     Tobacco Use   • Smoking status: Never Smoker   • Smokeless tobacco: Never Used   Vaping Use   • Vaping Use: Never used   Substance Use Topics   • Alcohol use: Yes     Alcohol/week: 1.8 - 2.4 oz     Types: 3 - 4 Glasses of wine per week     Comment: 3-4 GLASSES OF WINE A WEEK   • Drug use: No     Family History   Problem Relation Age of Onset   • Heart Disease Mother         MI   • Heart Disease Father 80        MI   • Cancer Sister         breast cancer   • Breast Cancer Sister      She  has a past medical history of Anxiety, GERD (gastroesophageal reflux disease), Osteoporosis, and Thyroid disease (2019).   Past Surgical History:   Procedure Laterality Date   • OTHER ORTHOPEDIC SURGERY         Since patient's last visit she been doing relatively well with less grief and insomnia but over the last few months she has developed significant insomnia which she has not experienced since loss of her  4 years ago.  She says she watches TV late at night and explained that electronic stimulation from artificial light would inhibit her sleep and she should try reading instead of using the TV to go to sleep.  In addition she will try melatonin and Benadryl OTC.    Other problem is for the last 2 weeks she slipped and fell and strained her left knee and has had pain discomfort and stiffness in the left knee which is slowly  getting slightly better but still is stiff and swollen which is worse when she stays and active.  She wanted know she should continue to walk on I said she should but only with limited amounts until after ice and Tylenol rest and elevation it should gradually improve over the next 3 months.  If it does not she will let us know and we will refer her to orthopedic surgeon for further evaluation since her exam does show some mild crepitance in the left knee significance of possible early arthritis.  Cannot rule out acute strain however.  She does not need an x-ray at this time.    Exam:     Ht 1.524 m (5')   Wt 60.5 kg (133 lb 6.4 oz)  Body mass index is 26.05 kg/m².    Hearing excellent.    Dentition good  Alert, oriented in no acute distress.  Eye contact is good, speech goal directed, affect calm       Assessment and Plan. The following treatment and monitoring plan is recommended:     1. Medicare annual wellness visit, subsequent  Completed.  Up-to-date.    2. Hypothyroidism, acquired, autoimmune  Good control continue current regimen  - TSH; Future    3. IFG (impaired fasting glucose)  Good control continue high-fiber low protein diet.  - HEMOGLOBIN A1C; Future    4. Acute maxillary sinusitis, recurrence not specified  Good control continue current regimen    5. Vitamin D deficiency  Good control continue current regimen- Lipid Profile; Future  - CBC WITH DIFFERENTIAL; Future  - VITAMIN D,25 HYDROXY; Future    6. Dyslipidemia  Good control continue current regimen    7. Primary insomnia-new problem  See above.    8. Acute pain of left knee-new problem  See above.    Services suggested: No services needed at this time  Health Care Screening recommendations as per orders if indicated.  Referrals offered: PT/OT/Nutrition counseling/Behavioral Health/Smoking cessation as per orders if indicated.    Discussion today about general wellness and lifestyle habits:    · Prevent falls and reduce trip hazards; Cautioned  about securing or removing rugs.  · Have a working fire alarm and carbon monoxide detector;   · Engage in regular physical activity and social activities       Follow-up: No follow-ups on file.

## 2021-11-29 NOTE — LETTER
[facility]  Keon Meza M.D.  [address]  [place] [address]  Fax: [phone]   Authorization for Release/Disclosure of   Protected Health Information   Name: YAZ QUILES : 1950 SSN: [id number]   Address: [address] [place] [address] Phone:    [phone] (home) [phone] (work)   I authorize the entity listed below to release/disclose the PHI below to:   [facility]/Keon Meza M.D. and Keon Meza M.D.   Provider or Entity Name: [facility]      Address: [facility]   Phone:      Fax:     Reason for request: continuity of care   Information to be released:    [  ] LAST COLONOSCOPY,  including any PATH REPORT and follow-up  [  ] LAST FIT/COLOGUARD RESULT [  ] LAST DEXA  [  ] LAST MAMMOGRAM  [  ] LAST PAP  [  ] LAST LABS [ XXX ] RETINA EXAM REPORT  [  ] IMMUNIZATION RECORDS  [  ] Release all info      [  ] Check here and initial the line next to each item to release ALL health information INCLUDING  _____ Care and treatment for drug and / or alcohol abuse  _____ HIV testing, infection status, or AIDS  _____ Genetic Testing    DATES OF SERVICE OR TIME PERIOD TO BE DISCLOSED: _____________  I understand and acknowledge that:  * This Authorization may be revoked at any time by you in writing, except if your health information has already been used or disclosed.  * Your health information that will be used or disclosed as a result of you signing this authorization could be re-disclosed by the recipient. If this occurs, your re-disclosed health information may no longer be protected by State or Federal laws.  * You may refuse to sign this Authorization. Your refusal will not affect your ability to obtain treatment.  * This Authorization becomes effective upon signing and will  on (date) __________.      If no date is indicated, this Authorization will  one (1) year from the signature date.    Name: Yaz Quiles    Signature:    Date:     11/29/2021       PLEASE FAX REQUESTED RECORDS BACK TO: [phone]

## 2021-12-06 DIAGNOSIS — H10.13 ALLERGIC CONJUNCTIVITIS OF BOTH EYES: ICD-10-CM

## 2021-12-07 RX ORDER — NAPHAZOLINE HYDROCHLORIDE AND PHENIRAMINE MALEATE .25; 3 MG/ML; MG/ML
SOLUTION/ DROPS OPHTHALMIC
Qty: 5 ML | Refills: 0 | Status: SHIPPED | OUTPATIENT
Start: 2021-12-07 | End: 2022-08-31

## 2022-01-27 ENCOUNTER — TELEPHONE (OUTPATIENT)
Dept: MEDICAL GROUP | Age: 72
End: 2022-01-27

## 2022-01-27 DIAGNOSIS — K21.00 GASTROESOPHAGEAL REFLUX DISEASE WITH ESOPHAGITIS, UNSPECIFIED WHETHER HEMORRHAGE: ICD-10-CM

## 2022-01-27 NOTE — TELEPHONE ENCOUNTER
1. Caller Name: Yaz Farooq                        Call Back Number: [phone] (home) [phone] (work)      How would the patient prefer to be contacted with a response: Phone call do NOT leave a detailed message    2. SPECIFIC Action To Be Taken: Referral pending, please sign.    3. Diagnosis/Clinical Reason for Request:K21.9 (ICD-10-CM) - Gastroesophageal reflux disease, esophagitis presence not specified     4. Specialty & Provider Name/Lab/[facility]aging [facility]     5. Is appointment scheduled for requested order/referral: no    Patient was not informed they will receive a return phone call from the office ONLY if there are any questions before processing their request. Advised to call back if they haven't received a call from the referral department in 5 days.

## 2022-03-28 ENCOUNTER — APPOINTMENT (OUTPATIENT)
Dept: RADIOLOGY | Facility: MEDICAL CENTER | Age: 72
End: 2022-03-28
Attending: OBSTETRICS & GYNECOLOGY
Payer: MEDICARE

## 2022-04-07 ENCOUNTER — HOSPITAL ENCOUNTER (OUTPATIENT)
Dept: RADIOLOGY | Facility: MEDICAL CENTER | Age: 72
End: 2022-04-07
Attending: OBSTETRICS & GYNECOLOGY
Payer: MEDICARE

## 2022-04-07 DIAGNOSIS — Z12.31 VISIT FOR SCREENING MAMMOGRAM: ICD-10-CM

## 2022-04-07 PROCEDURE — 77063 BREAST TOMOSYNTHESIS BI: CPT

## 2022-04-14 ENCOUNTER — TELEPHONE (OUTPATIENT)
Dept: MEDICAL GROUP | Age: 72
End: 2022-04-14

## 2022-04-14 DIAGNOSIS — M25.562 ACUTE PAIN OF LEFT KNEE: ICD-10-CM

## 2022-04-14 NOTE — TELEPHONE ENCOUNTER
Patient was seen at her wellness visit in November , states you mentioned physical therapy  But declined at that time . Patient states that now she would like a referral to physical  therapy  Please advise

## 2022-04-15 NOTE — TELEPHONE ENCOUNTER
1. Caller Name: Yaz Farooq                          Call Back Number: [phone] (home) [phone] (work)        How would the patient prefer to be contacted with a response: Phone call do NOT leave a detailed message    2. SPECIFIC Action To Be Taken: Referral pending, please sign.    3. Diagnosis/Clinical Reason for Request: left knee pain     4. Specialty & Provider Name/Lab/Imaging Location: PT    5. Is appointment scheduled for requested order/referral: no    Patient was informed they will receive a return phone call from the office ONLY if there are any questions before processing their request. Advised to call back if they haven't received a call from the referral department in 5 days.

## 2022-04-15 NOTE — TELEPHONE ENCOUNTER
Left voicemail for patient indicating a referral has been placed to physical therapy per her request as well as the process for insurance authorization.  Let her know she should receive a call in the next 10-14 business days from the referral team letting her know which office she has been referred to.

## 2022-04-19 ENCOUNTER — TELEPHONE (OUTPATIENT)
Dept: MEDICAL GROUP | Age: 72
End: 2022-04-19
Payer: MEDICARE

## 2022-04-19 DIAGNOSIS — M54.50 LOW BACK PAIN, UNSPECIFIED BACK PAIN LATERALITY, UNSPECIFIED CHRONICITY, UNSPECIFIED WHETHER SCIATICA PRESENT: ICD-10-CM

## 2022-04-19 NOTE — TELEPHONE ENCOUNTER
Caller Name: Yaz Farooq    Call Back Number: [phone] (home) [phone] (work)      How would the patient prefer to be contacted with a response: Phone call do NOT leave a detailed message    2. SPECIFIC Action To Be Taken: Referral pending, please sign.    3. Diagnosis/Clinical Reason for Request: low back pain    4. Specialty & Provider Name/Lab/Imaging Location: PT    5. Is appointment scheduled for requested order/referral: no    Patient was not informed they will receive a return phone call from the office ONLY if there are any questions before processing their request. Advised to call back if they haven't received a call from the referral department in 5 days.

## 2022-06-17 ENCOUNTER — HOSPITAL ENCOUNTER (OUTPATIENT)
Facility: MEDICAL CENTER | Age: 72
End: 2022-06-17
Attending: STUDENT IN AN ORGANIZED HEALTH CARE EDUCATION/TRAINING PROGRAM
Payer: MEDICARE

## 2022-06-17 ENCOUNTER — OFFICE VISIT (OUTPATIENT)
Dept: URGENT CARE | Facility: CLINIC | Age: 72
End: 2022-06-17
Payer: MEDICARE

## 2022-06-17 VITALS
DIASTOLIC BLOOD PRESSURE: 72 MMHG | BODY MASS INDEX: 25.52 KG/M2 | SYSTOLIC BLOOD PRESSURE: 116 MMHG | TEMPERATURE: 98.1 F | HEIGHT: 60 IN | HEART RATE: 74 BPM | RESPIRATION RATE: 16 BRPM | WEIGHT: 130 LBS | OXYGEN SATURATION: 97 %

## 2022-06-17 DIAGNOSIS — R09.89 CHEST CONGESTION: ICD-10-CM

## 2022-06-17 DIAGNOSIS — J01.11 ACUTE RECURRENT FRONTAL SINUSITIS: ICD-10-CM

## 2022-06-17 DIAGNOSIS — R05.9 COUGH: ICD-10-CM

## 2022-06-17 PROCEDURE — 99214 OFFICE O/P EST MOD 30 MIN: CPT | Performed by: STUDENT IN AN ORGANIZED HEALTH CARE EDUCATION/TRAINING PROGRAM

## 2022-06-17 PROCEDURE — 0240U HCHG SARS-COV-2 COVID-19 NFCT DS RESP RNA 3 TRGT MIC: CPT

## 2022-06-17 RX ORDER — AZITHROMYCIN 250 MG/1
TABLET, FILM COATED ORAL
Qty: 6 TABLET | Refills: 0 | Status: SHIPPED
Start: 2022-06-17 | End: 2022-07-12

## 2022-06-17 ASSESSMENT — FIBROSIS 4 INDEX: FIB4 SCORE: 1.91

## 2022-06-17 ASSESSMENT — ENCOUNTER SYMPTOMS
HEADACHES: 0
SORE THROAT: 1
COUGH: 1
WHEEZING: 0
FEVER: 0
SHORTNESS OF BREATH: 0

## 2022-06-17 NOTE — PROGRESS NOTES
Subjective     Yaz Farooq is a 72 y.o. female who presents with Cough (X 4 days, cough, tired, chest hurts when coughing, sinus congestion. )            Pt presents today with symptoms of fatigue, cough and sinus congestion for the last week. She states cough is productive and is not getting better. She states she has tried OTC cough medicine which has provided some relief.    Cough  The current episode started in the past 7 days. The problem has been unchanged. The cough is productive of sputum. Associated symptoms include nasal congestion, postnasal drip and a sore throat. Pertinent negatives include no chest pain, ear congestion, ear pain, fever, headaches, hemoptysis, shortness of breath or wheezing. She has tried OTC cough suppressant and rest for the symptoms. The treatment provided mild relief.       Review of Systems   Constitutional: Positive for malaise/fatigue. Negative for fever.   HENT: Positive for congestion, postnasal drip and sore throat. Negative for ear pain.    Eyes: Negative for pain.   Respiratory: Positive for cough and sputum production. Negative for hemoptysis, shortness of breath and wheezing.    Cardiovascular: Negative for chest pain.   Neurological: Negative for headaches.              Objective     /72   Pulse 74   Temp 36.7 °C (98.1 °F) (Temporal)   Resp 16   Ht 1.524 m (5')   Wt 59 kg (130 lb)   SpO2 97%   Breastfeeding No   BMI 25.39 kg/m²      Physical Exam  Vitals reviewed.   Constitutional:       Appearance: Normal appearance.   HENT:      Right Ear: Tympanic membrane and ear canal normal.      Left Ear: Tympanic membrane and ear canal normal.      Nose: Congestion present.      Right Sinus: Maxillary sinus tenderness and frontal sinus tenderness present.      Left Sinus: Maxillary sinus tenderness and frontal sinus tenderness present.      Mouth/Throat:      Mouth: Mucous membranes are moist.      Pharynx: Posterior oropharyngeal erythema present.   Eyes:       Conjunctiva/sclera: Conjunctivae normal.      Pupils: Pupils are equal, round, and reactive to light.   Cardiovascular:      Rate and Rhythm: Normal rate and regular rhythm.      Heart sounds: Normal heart sounds.   Pulmonary:      Effort: Pulmonary effort is normal.      Breath sounds: Normal breath sounds.   Neurological:      Mental Status: She is alert.                             Assessment & Plan        1. Acute recurrent frontal sinusitis  - azithromycin (ZITHROMAX) 250 MG Tab; Take 2 tablets (500 mg) PO on day 1 and then take 1 tablet (250 mg) daily on 2-5  Dispense: 6 Tablet; Refill: 0    2. Chest congestion  - CoV-2 and Flu A/B by PCR (24 hour In-House): Collect NP swab in VTM; Future  - azithromycin (ZITHROMAX) 250 MG Tab; Take 2 tablets (500 mg) PO on day 1 and then take 1 tablet (250 mg) daily on 2-5  Dispense: 6 Tablet; Refill: 0    3. Cough  - CoV-2 and Flu A/B by PCR (24 hour In-House): Collect NP swab in VTM; Future      Pt tested for CoV-2 and Flu A/B by PCR in office today. Pt educated that results will be available via [email or web address] in approx. 24 hours. Pt will be personally contacted via telephone if results are postive.    Given pts physical exam and history of sinusitis. Pt prescribed zpack. Pt requesting zpack in office because she does not want to have to return if these symptoms presist and will later need abx. Pt only to start antibiotics if sinusitis symptoms persists longer than 10-14 days. Pt agrees to this plan of care.    Patient educated that sinusitis should be managed with supportive care until then. Supportive measures may include:  Increase daily water intake and make sure getting adequate rest.  Mechanical saline irrigation and intranasal saline spray may temporarily improve nasal patency and loosen secretions.   Inhalation of warm/humidified air (steam) may provide relief of symptoms.    I personally reviewed prior external notes and test results pertinent to today's  visit.    Differential diagnoses, supportive care, and indications for immediate follow-up discussed with patient. Pathogenesis of diagnosis discussed including typical length and natural progression.      Pt instructed to return to urgent care or nearest emergency department if symptoms fail to improve, for any change in condition, further concerns, or new concerning symptoms.    Patient states understanding and agrees with the plan of care and discharge instructions.       Latest Reference Range & Units 06/17/22 12:49   Influenza virus A RNA Negative  Negative   Influenza virus B, PCR Negative  Negative   SARS-CoV-2 by PCR  DETECTED !!   SARS-CoV-2 Source  Nasal Swab   !!: Data is critical    Pt contacted via telephone and notified of results. She states she has f/u PCP appointment scheduled. All questions answered on the telephone.

## 2022-06-18 DIAGNOSIS — R09.89 CHEST CONGESTION: ICD-10-CM

## 2022-06-18 DIAGNOSIS — R05.9 COUGH: ICD-10-CM

## 2022-06-19 LAB
FLUAV RNA SPEC QL NAA+PROBE: NEGATIVE
FLUBV RNA SPEC QL NAA+PROBE: NEGATIVE
SARS-COV-2 RNA RESP QL NAA+PROBE: DETECTED
SPECIMEN SOURCE: ABNORMAL

## 2022-06-21 ENCOUNTER — TELEPHONE (OUTPATIENT)
Dept: MEDICAL GROUP | Age: 72
End: 2022-06-21
Payer: MEDICARE

## 2022-06-21 NOTE — TELEPHONE ENCOUNTER
FYI Patient called in to let us known she was  Positive for covid they tested her at urgent care she states its been 11 days and she is feeling a little better still a little stuffed up with mucus , I let her kniw to give me a call on Thursday if she isn't feeling better

## 2022-06-23 ASSESSMENT — ENCOUNTER SYMPTOMS
HEMOPTYSIS: 0
EYE PAIN: 0
SPUTUM PRODUCTION: 1

## 2022-06-28 ENCOUNTER — OFFICE VISIT (OUTPATIENT)
Dept: MEDICAL GROUP | Age: 72
End: 2022-06-28
Payer: MEDICARE

## 2022-06-28 VITALS
HEIGHT: 60 IN | OXYGEN SATURATION: 98 % | HEART RATE: 78 BPM | WEIGHT: 131.6 LBS | DIASTOLIC BLOOD PRESSURE: 82 MMHG | RESPIRATION RATE: 16 BRPM | TEMPERATURE: 98.5 F | BODY MASS INDEX: 25.84 KG/M2 | SYSTOLIC BLOOD PRESSURE: 116 MMHG

## 2022-06-28 DIAGNOSIS — U07.1 ACUTE BRONCHITIS DUE TO COVID-19 VIRUS: ICD-10-CM

## 2022-06-28 DIAGNOSIS — J30.9 ALLERGIC RHINITIS, UNSPECIFIED SEASONALITY, UNSPECIFIED TRIGGER: ICD-10-CM

## 2022-06-28 DIAGNOSIS — J45.909 ACUTE ASTHMATIC BRONCHITIS: ICD-10-CM

## 2022-06-28 DIAGNOSIS — J20.8 ACUTE BRONCHITIS DUE TO COVID-19 VIRUS: ICD-10-CM

## 2022-06-28 DIAGNOSIS — J01.90 ACUTE NON-RECURRENT SINUSITIS, UNSPECIFIED LOCATION: ICD-10-CM

## 2022-06-28 PROCEDURE — 99214 OFFICE O/P EST MOD 30 MIN: CPT | Mod: CS | Performed by: INTERNAL MEDICINE

## 2022-06-28 RX ORDER — DOXYCYCLINE HYCLATE 100 MG
100 TABLET ORAL 2 TIMES DAILY
Qty: 14 TABLET | Refills: 0 | Status: SHIPPED | OUTPATIENT
Start: 2022-06-28 | End: 2022-07-05

## 2022-06-28 RX ORDER — TRIAMCINOLONE ACETONIDE 40 MG/ML
40 INJECTION, SUSPENSION INTRA-ARTICULAR; INTRAMUSCULAR ONCE
Status: COMPLETED | OUTPATIENT
Start: 2022-06-28 | End: 2022-06-28

## 2022-06-28 RX ORDER — AZELASTINE 1 MG/ML
2 SPRAY, METERED NASAL 2 TIMES DAILY
Qty: 30 ML | Refills: 0 | Status: SHIPPED | OUTPATIENT
Start: 2022-06-28 | End: 2023-04-05 | Stop reason: SDUPTHER

## 2022-06-28 RX ADMIN — TRIAMCINOLONE ACETONIDE 40 MG: 40 INJECTION, SUSPENSION INTRA-ARTICULAR; INTRAMUSCULAR at 11:47

## 2022-06-28 ASSESSMENT — ENCOUNTER SYMPTOMS
NEUROLOGICAL NEGATIVE: 1
GASTROINTESTINAL NEGATIVE: 1
CARDIOVASCULAR NEGATIVE: 1
CONSTITUTIONAL NEGATIVE: 1
RESPIRATORY NEGATIVE: 1
PSYCHIATRIC NEGATIVE: 1
MUSCULOSKELETAL NEGATIVE: 1
EYES NEGATIVE: 1

## 2022-06-28 ASSESSMENT — PATIENT HEALTH QUESTIONNAIRE - PHQ9: CLINICAL INTERPRETATION OF PHQ2 SCORE: 0

## 2022-06-28 ASSESSMENT — FIBROSIS 4 INDEX: FIB4 SCORE: 1.91

## 2022-06-28 NOTE — PROGRESS NOTES
Subjective     Yaz Farooq is a 72 y.o. female who presents with Cough (Stuffy nose / sinuses x 3 weeks was seen in the urgent care)  Patient is here for 3-week follow-up visit from urgent care when she presented with cough runny nose low-grade fever and found to have COVID-19.  She was treated with a Z-Miko and Tessalon Perles.  She is complaining that she is only slightly better and wants to be help with her congestion and cough.  Frustrated that she is not getting better faster.  Did not get fully vaccinated (no boosters.  2 initial Pfizer vaccines a year ago).  Was not given any antiviral medications 3 weeks ago.  She was past the 5-day window at the time of presentation from the time of her symptom onset  Outpatient Medications Prior to Visit   Medication Sig Dispense Refill   • Thyroid 16.25 MG Tab Take 1 Tab by mouth every day. In addition to 65 mg thyroid pill 90 Tab 4   • azithromycin (ZITHROMAX) 250 MG Tab Take 2 tablets (500 mg) PO on day 1 and then take 1 tablet (250 mg) daily on 2-5 6 Tablet 0   • NAPHCON-A 0.025-0.3 % ophthalmic solution place 1 drop into both eyes 4 times a day 5 mL 0   • azithromycin (ZITHROMAX) 250 MG Tab Take 2 tabs today then 1 per day for 4 days (Patient not taking: Reported on 6/17/2022) 6 Tablet 1   • thyroid (WP THYROID) 65 MG tablet Take 1 Tab by mouth every day. In addition to 16.25 mg thyroid pill (Patient not taking: Reported on 6/17/2022) 90 Tab 4     No facility-administered medications prior to visit.     Allergies   Allergen Reactions   • Augmentin      Pain in the stomach  Other reaction(s): Abdominal Pain   • Compazine Shortness of Breath and Swelling     Other reaction(s): Respiratory Distress   • Amoxicillin-Clavulanic Acid [Amoxicillin-Pot Clavulanate]      Pain in stomach     No new subjective & objective note has been filed under this hospital service since the last note was generated.              HPI    Review of Systems   Constitutional: Negative.     HENT: Negative.    Eyes: Negative.    Respiratory: Negative.    Cardiovascular: Negative.    Gastrointestinal: Negative.    Genitourinary: Negative.    Musculoskeletal: Negative.    Skin: Negative.    Neurological: Negative.    Endo/Heme/Allergies: Negative.    Psychiatric/Behavioral: Negative.               Objective     /82 (BP Location: Right arm, Patient Position: Sitting, BP Cuff Size: Adult)   Pulse 78   Temp 36.9 °C (98.5 °F) (Temporal)   Resp 16   Ht 1.524 m (5')   Wt 59.7 kg (131 lb 9.6 oz)   SpO2 98%   BMI 25.70 kg/m²      Physical Exam  Vitals reviewed.   Constitutional:       General: She is not in acute distress.     Appearance: She is well-developed. She is not diaphoretic.   HENT:      Head: Normocephalic and atraumatic.      Right Ear: External ear normal.      Left Ear: External ear normal.      Nose: Nose normal.      Mouth/Throat:      Pharynx: No oropharyngeal exudate.   Eyes:      General: No scleral icterus.        Right eye: No discharge.         Left eye: No discharge.      Conjunctiva/sclera: Conjunctivae normal.      Pupils: Pupils are equal, round, and reactive to light.   Neck:      Thyroid: No thyromegaly.      Vascular: No JVD.      Trachea: No tracheal deviation.   Cardiovascular:      Rate and Rhythm: Normal rate and regular rhythm.      Heart sounds: Normal heart sounds. No murmur heard.    No friction rub. No gallop.   Pulmonary:      Effort: Pulmonary effort is normal. No respiratory distress.      Breath sounds: Normal breath sounds. No stridor. No wheezing or rales.   Chest:      Chest wall: No tenderness.   Abdominal:      General: Bowel sounds are normal. There is no distension.      Palpations: Abdomen is soft. There is no mass.      Tenderness: There is no abdominal tenderness. There is no guarding or rebound.   Musculoskeletal:         General: No tenderness. Normal range of motion.      Cervical back: Normal range of motion and neck supple.   Lymphadenopathy:       Cervical: No cervical adenopathy.   Skin:     General: Skin is warm and dry.      Coloration: Skin is not pale.      Findings: No erythema or rash.   Neurological:      Mental Status: She is alert and oriented to person, place, and time.      Cranial Nerves: No cranial nerve deficit.      Motor: No abnormal muscle tone.      Coordination: Coordination normal.      Deep Tendon Reflexes: Reflexes are normal and symmetric. Reflexes normal.   Psychiatric:         Behavior: Behavior normal.         Thought Content: Thought content normal.         Judgment: Judgment normal.       Hospital Outpatient Visit on 06/17/2022   Component Date Value   • Influenza virus A RNA 06/17/2022 Negative    • Influenza virus B, PCR 06/17/2022 Negative    • SARS-CoV-2 by PCR 06/17/2022 DETECTED (A)   • SARS-CoV-2 Source 06/17/2022 Nasal Swab       '  Lab Results   Component Value Date/Time    HBA1C 5.4 11/17/2021 08:20 AM    HBA1C 5.5 10/21/2020 08:40 AM     Lab Results   Component Value Date/Time    SODIUM 139 11/17/2021 08:20 AM    POTASSIUM 4.6 11/17/2021 08:20 AM    CHLORIDE 103 11/17/2021 08:20 AM    CO2 27 11/17/2021 08:20 AM    GLUCOSE 97 11/17/2021 08:20 AM    BUN 11 11/17/2021 08:20 AM    CREATININE 0.48 (L) 11/17/2021 08:20 AM    ALKPHOSPHAT 82 11/17/2021 08:20 AM    ASTSGOT 24 11/17/2021 08:20 AM    ALTSGPT 18 11/17/2021 08:20 AM    TBILIRUBIN 0.4 11/17/2021 08:20 AM     No results found for: INR  Lab Results   Component Value Date/Time    CHOLSTRLTOT 206 (H) 11/17/2021 08:20 AM     (H) 11/17/2021 08:20 AM    HDL 76 11/17/2021 08:20 AM    TRIGLYCERIDE 128 11/17/2021 08:20 AM       No results found for: TESTOSTERONE  No results found for: TSH  Lab Results   Component Value Date/Time    FREET4 0.81 (L) 09/14/2021 04:42 PM    FREET4 0.95 06/10/2019 07:42 AM     No results found for: URICACID  No components found for: VITB12  Lab Results   Component Value Date/Time    25HYDROXY 70 11/17/2021 08:20 AM    25HYDROXY 71  10/21/2020 08:40 AM                             Assessment & Plan        1. Acute bronchitis due to COVID-19 virus  Slowly resolving but still has a faint wheeze.  Shot of cortisone given.    2. Acute asthmatic bronchitis  As above  - triamcinolone acetonide (KENALOG-40) injection 40 mg  - doxycycline (VIBRAMYCIN) 100 MG Tab; Take 1 Tablet by mouth 2 times a day for 7 days.  Dispense: 14 Tablet; Refill: 0    3. Allergic rhinitis, unspecified seasonality, unspecified trigger  Continue with Flonase nasal spray add Astelin as needed for congestion.  As needed.  - azelastine (ASTELIN) 137 MCG/SPRAY nasal spray; Administer 2 Sprays into affected nostril(S) 2 times a day.  Dispense: 30 mL; Refill: 0    4. Acute non-recurrent sinusitis, unspecified location          - doxycycline (VIBRAMYCIN) 100 MG Tab; Take 1 Tablet by mouth 2 times a day for 7 days.  Dispense: 14 Tablet; Refill: 0    Recheck in 2 weeks to document improvement.  Patient is no longer contagious as she is 3 weeks out.  Patient reassured that she can go on public but continue to wear a mask, primarily to protect herself..

## 2022-07-12 ENCOUNTER — OFFICE VISIT (OUTPATIENT)
Dept: MEDICAL GROUP | Age: 72
End: 2022-07-12
Payer: MEDICARE

## 2022-07-12 VITALS
DIASTOLIC BLOOD PRESSURE: 76 MMHG | SYSTOLIC BLOOD PRESSURE: 120 MMHG | TEMPERATURE: 98.4 F | WEIGHT: 129.4 LBS | HEART RATE: 88 BPM | OXYGEN SATURATION: 99 % | HEIGHT: 60 IN | BODY MASS INDEX: 25.4 KG/M2 | RESPIRATION RATE: 16 BRPM

## 2022-07-12 DIAGNOSIS — Z74.09 POST-COVID-19 SYNDROME MANIFESTING AS CHRONIC DECREASED MOBILITY AND ENDURANCE: ICD-10-CM

## 2022-07-12 DIAGNOSIS — N95.1 MENOPAUSAL STATE: ICD-10-CM

## 2022-07-12 DIAGNOSIS — E78.5 DYSLIPIDEMIA: ICD-10-CM

## 2022-07-12 DIAGNOSIS — E55.9 VITAMIN D DEFICIENCY: ICD-10-CM

## 2022-07-12 DIAGNOSIS — J20.8 ACUTE BRONCHITIS DUE TO COVID-19 VIRUS: ICD-10-CM

## 2022-07-12 DIAGNOSIS — U09.9 POST-COVID-19 SYNDROME MANIFESTING AS CHRONIC DECREASED MOBILITY AND ENDURANCE: ICD-10-CM

## 2022-07-12 DIAGNOSIS — Z78.0 POSTMENOPAUSAL STATUS (AGE-RELATED) (NATURAL): ICD-10-CM

## 2022-07-12 DIAGNOSIS — U07.1 ACUTE BRONCHITIS DUE TO COVID-19 VIRUS: ICD-10-CM

## 2022-07-12 DIAGNOSIS — R29.90 POST-COVID CHRONIC NEUROLOGIC SYMPTOMS: ICD-10-CM

## 2022-07-12 DIAGNOSIS — F43.21 GRIEVING: ICD-10-CM

## 2022-07-12 DIAGNOSIS — E06.3 HYPOTHYROIDISM, ACQUIRED, AUTOIMMUNE: ICD-10-CM

## 2022-07-12 DIAGNOSIS — G93.32 POST-COVID-19 SYNDROME MANIFESTING AS CHRONIC FATIGUE: ICD-10-CM

## 2022-07-12 DIAGNOSIS — J45.40 MODERATE PERSISTENT ASTHMA WITHOUT COMPLICATION: ICD-10-CM

## 2022-07-12 DIAGNOSIS — R05.3 POST-COVID-19 SYNDROME MANIFESTING AS CHRONIC COUGH: ICD-10-CM

## 2022-07-12 DIAGNOSIS — U09.9 POST-COVID-19 SYNDROME MANIFESTING AS CHRONIC COUGH: ICD-10-CM

## 2022-07-12 DIAGNOSIS — R73.01 IFG (IMPAIRED FASTING GLUCOSE): ICD-10-CM

## 2022-07-12 DIAGNOSIS — U09.9 POST-COVID-19 SYNDROME MANIFESTING AS CHRONIC FATIGUE: ICD-10-CM

## 2022-07-12 DIAGNOSIS — U09.9 POST-COVID CHRONIC NEUROLOGIC SYMPTOMS: ICD-10-CM

## 2022-07-12 PROCEDURE — 99214 OFFICE O/P EST MOD 30 MIN: CPT | Mod: CS | Performed by: INTERNAL MEDICINE

## 2022-07-12 RX ORDER — THYROID 60 MG/1
60 TABLET ORAL DAILY
Qty: 30 TABLET | Refills: 3 | Status: SHIPPED | OUTPATIENT
Start: 2022-07-12 | End: 2022-08-03

## 2022-07-12 RX ORDER — FLUTICASONE PROPIONATE AND SALMETEROL 250; 50 UG/1; UG/1
1 POWDER RESPIRATORY (INHALATION) 2 TIMES DAILY
Qty: 1 EACH | Refills: 3 | Status: SHIPPED | OUTPATIENT
Start: 2022-07-12 | End: 2023-04-05 | Stop reason: SDUPTHER

## 2022-07-12 RX ORDER — ALBUTEROL SULFATE 90 UG/1
2 AEROSOL, METERED RESPIRATORY (INHALATION) EVERY 4 HOURS PRN
Qty: 1 EACH | Refills: 5 | Status: SHIPPED | OUTPATIENT
Start: 2022-07-12 | End: 2023-04-05 | Stop reason: SDUPTHER

## 2022-07-12 ASSESSMENT — ENCOUNTER SYMPTOMS
CARDIOVASCULAR NEGATIVE: 1
WEAKNESS: 1
GASTROINTESTINAL NEGATIVE: 1
NERVOUS/ANXIOUS: 1
EYES NEGATIVE: 1
WHEEZING: 1
SHORTNESS OF BREATH: 1
MUSCULOSKELETAL NEGATIVE: 1
DEPRESSION: 1

## 2022-07-12 ASSESSMENT — FIBROSIS 4 INDEX: FIB4 SCORE: 1.91

## 2022-07-12 NOTE — PROGRESS NOTES
Subjective     Yaz Farooq is a 72 y.o. female who presents with Follow-Up (On last visit for covid / states  she has tiredness )  The patient is here for follow-up of her sequela from her COVID-19 infection from 6/17/2022 at this by acute severe bronchitis with wheezing and sputum duction.  When last seen few weeks ago she was prescribed Medrol Dosepak and oral antibiotics.  Kenalog and since then has done much better but still has some mild wheezing and cough.  No fever chills or chest pain.  She does have exertional fatigue and exertional dyspnea.  She also has loss of balance and brain fog.  She has no prior history of asthma.  Patient is quite frustrated by her inability to get over her respiratory complaints completely get her strength back.   Outpatient Medications Prior to Visit   Medication Sig Dispense Refill   • azelastine (ASTELIN) 137 MCG/SPRAY nasal spray Administer 2 Sprays into affected nostril(S) 2 times a day. 30 mL 0   • NAPHCON-A 0.025-0.3 % ophthalmic solution place 1 drop into both eyes 4 times a day 5 mL 0   • Thyroid 16.25 MG Tab Take 1 Tab by mouth every day. In addition to 65 mg thyroid pill 90 Tab 4   • azithromycin (ZITHROMAX) 250 MG Tab Take 2 tablets (500 mg) PO on day 1 and then take 1 tablet (250 mg) daily on 2-5 6 Tablet 0   • azithromycin (ZITHROMAX) 250 MG Tab Take 2 tabs today then 1 per day for 4 days (Patient not taking: No sig reported) 6 Tablet 1   • thyroid (WP THYROID) 65 MG tablet Take 1 Tab by mouth every day. In addition to 16.25 mg thyroid pill (Patient not taking: No sig reported) 90 Tab 4     No facility-administered medications prior to visit.     Allergies   Allergen Reactions   • Augmentin      Pain in the stomach  Other reaction(s): Abdominal Pain   • Compazine Shortness of Breath and Swelling     Other reaction(s): Respiratory Distress   • Amoxicillin-Clavulanic Acid [Amoxicillin-Pot Clavulanate]      Pain in stomach     Patient Active Problem List     Diagnosis Date Noted   • Post-COVID-19 syndrome manifesting as chronic fatigue 07/12/2022   • Post-COVID-19 syndrome manifesting as chronic cough 07/12/2022   • Post-COVID chronic neurologic symptoms 07/12/2022   • Post-COVID-19 syndrome manifesting as chronic decreased mobility and endurance 07/12/2022   • Moderate persistent asthma without complication 07/12/2022   • Acute bronchitis due to COVID-19 virus -6/17/2022 07/12/2022   • Primary insomnia 11/29/2021   • IFG (impaired fasting glucose) 08/13/2020   • Environmental and seasonal allergies 08/13/2020   • Allergic conjunctivitis of both eyes 08/13/2020   • Hypothyroidism, acquired, autoimmune 06/08/2020   • Grieving 05/20/2020   • GERD (gastroesophageal reflux disease) 05/20/2020   • Dysphagia 10/11/2019     Hospital Outpatient Visit on 06/17/2022   Component Date Value   • Influenza virus A RNA 06/17/2022 Negative    • Influenza virus B, PCR 06/17/2022 Negative    • SARS-CoV-2 by PCR 06/17/2022 DETECTED (A)   • SARS-CoV-2 Source 06/17/2022 Nasal Swab       Lab Results   Component Value Date/Time    HBA1C 5.4 11/17/2021 08:20 AM    HBA1C 5.5 10/21/2020 08:40 AM     Lab Results   Component Value Date/Time    SODIUM 139 11/17/2021 08:20 AM    POTASSIUM 4.6 11/17/2021 08:20 AM    CHLORIDE 103 11/17/2021 08:20 AM    CO2 27 11/17/2021 08:20 AM    GLUCOSE 97 11/17/2021 08:20 AM    BUN 11 11/17/2021 08:20 AM    CREATININE 0.48 (L) 11/17/2021 08:20 AM    ALKPHOSPHAT 82 11/17/2021 08:20 AM    ASTSGOT 24 11/17/2021 08:20 AM    ALTSGPT 18 11/17/2021 08:20 AM    TBILIRUBIN 0.4 11/17/2021 08:20 AM     No results found for: INR  Lab Results   Component Value Date/Time    CHOLSTRLTOT 206 (H) 11/17/2021 08:20 AM     (H) 11/17/2021 08:20 AM    HDL 76 11/17/2021 08:20 AM    TRIGLYCERIDE 128 11/17/2021 08:20 AM       No results found for: TESTOSTERONE  No results found for: TSH  Lab Results   Component Value Date/Time    FREET4 0.81 (L) 09/14/2021 04:42 PM    FREET4 0.95  06/10/2019 07:42 AM     No results found for: URICACID  No components found for: VITB12  Lab Results   Component Value Date/Time    25HYDROXY 70 11/17/2021 08:20 AM    25HYDROXY 71 10/21/2020 08:40 AM     '          HPI    Review of Systems   Constitutional: Positive for malaise/fatigue.   HENT: Negative.    Eyes: Negative.    Respiratory: Positive for shortness of breath and wheezing.    Cardiovascular: Negative.    Gastrointestinal: Negative.    Genitourinary: Negative.    Musculoskeletal: Negative.    Skin: Negative.    Neurological: Positive for weakness.   Endo/Heme/Allergies: Negative.    Psychiatric/Behavioral: Positive for depression. The patient is nervous/anxious.               Objective     /76 (BP Location: Right arm, Patient Position: Sitting, BP Cuff Size: Adult)   Pulse 88   Temp 36.9 °C (98.4 °F) (Temporal)   Resp 16   Ht 1.524 m (5')   Wt 58.7 kg (129 lb 6.4 oz)   SpO2 99%   BMI 25.27 kg/m²      Physical Exam  Vitals reviewed.   Constitutional:       General: She is not in acute distress.     Appearance: She is well-developed. She is not diaphoretic.   HENT:      Head: Normocephalic and atraumatic.      Right Ear: External ear normal.      Left Ear: External ear normal.      Nose: Nose normal.      Mouth/Throat:      Pharynx: No oropharyngeal exudate.   Eyes:      General: No scleral icterus.        Right eye: No discharge.         Left eye: No discharge.      Conjunctiva/sclera: Conjunctivae normal.      Pupils: Pupils are equal, round, and reactive to light.   Neck:      Thyroid: No thyromegaly.      Vascular: No JVD.      Trachea: No tracheal deviation.   Cardiovascular:      Rate and Rhythm: Normal rate and regular rhythm.      Heart sounds: Normal heart sounds. No murmur heard.    No friction rub. No gallop.   Pulmonary:      Effort: Pulmonary effort is normal. No respiratory distress.      Breath sounds: No stridor. Wheezing present. No rales.      Comments: Faint wheezes heard in  the mid expiration in both lung fields.  Chest:      Chest wall: No tenderness.   Abdominal:      General: Bowel sounds are normal. There is no distension.      Palpations: Abdomen is soft. There is no mass.      Tenderness: There is no abdominal tenderness. There is no guarding or rebound.   Musculoskeletal:         General: No tenderness. Normal range of motion.      Cervical back: Normal range of motion and neck supple.   Lymphadenopathy:      Cervical: No cervical adenopathy.   Skin:     General: Skin is warm and dry.      Coloration: Skin is not pale.      Findings: No erythema or rash.   Neurological:      Mental Status: She is alert and oriented to person, place, and time.      Cranial Nerves: No cranial nerve deficit.      Motor: No abnormal muscle tone.      Coordination: Coordination normal.      Deep Tendon Reflexes: Reflexes are normal and symmetric. Reflexes normal.   Psychiatric:         Behavior: Behavior normal.         Thought Content: Thought content normal.         Judgment: Judgment normal.                             Assessment & Plan                 1. Moderate persistent asthma without complication  Since patient still has wheezes we will start her on Advair maintenance inhaler and albuterol inhaler as a rescue inhaler..  - fluticasone-salmeterol (ADVAIR) 250-50 MCG/ACT AEROSOL POWDER, BREATH ACTIVATED; Inhale 1 Puff 2 times a day.  Dispense: 1 Each; Refill: 3  - albuterol 108 (90 Base) MCG/ACT Aero Soln inhalation aerosol; Inhale 2 Puffs every four hours as needed for Shortness of Breath.  Dispense: 1 Each; Refill: 5  - Sed Rate; Future    2  post-COVID-19 syndrome manifesting as chronic fatigue  Patient counseled and advised  - Sed Rate; Future    3. Post-COVID-19 syndrome manifesting as chronic cough   Patient counseled and advised  - Sed Rate; Future    4. Post-COVID chronic neurologic symptoms-mild brain fog.  No medications indicated but patient says she will try Prevagen and is  acceptable try.     - Sed Rate; Future    5. Post-COVID-19 syndrome manifesting as chronic decreased mobility and endurance   Patient counseled and advised- Sed Rate; Future    6. IFG (impaired fasting glucose)    diet/exercise/lose 15 lbs.; patient counseled'  - Sed Rate; Future  - HEMOGLOBIN A1C; Future    7. Hypothyroidism, acquired, autoimmune  This is rechecking.  It is not clear how much she is taking and if she is taking an adequate amount.  She says she is not taking the 65 mg of thyroid extract but is taking 16 mg daily.  We will call to confirm what she is actually taking.  Check labs  - TSH; Future  - FREE THYROXINE; Future  - Comp Metabolic Panel; Future  - Lipid Profile; Future  - CBC WITH DIFFERENTIAL; Future    8. Dyslipidemia   Under good control. Continue same regimen.'  - TSH; Future  - FREE THYROXINE; Future  - Comp Metabolic Panel; Future  - Lipid Profile; Future  - CBC WITH DIFFERENTIAL; Future    9. Vitamin D deficiency    Under good control. Continue same regimen.'  - VITAMIN D,25 HYDROXY; Future    10. Postmenopausal status (age-related) (natural)      - DS-BONE DENSITY STUDY (DEXA); Future    11. Menopausal state     - DS-BONE DENSITY STUDY (DEXA); Future    12. Acute bronchitis due to COVID-19 virus -6/17/2022  Still wheezing.  See above.  Start albuterol and Advair inhalers    13. Grieving      Client presents at this time.  We will revisit this issue on the visit.

## 2022-07-22 ENCOUNTER — HOSPITAL ENCOUNTER (OUTPATIENT)
Dept: RADIOLOGY | Facility: MEDICAL CENTER | Age: 72
End: 2022-07-22
Attending: INTERNAL MEDICINE
Payer: MEDICARE

## 2022-07-22 DIAGNOSIS — Z78.0 POSTMENOPAUSAL STATUS (AGE-RELATED) (NATURAL): ICD-10-CM

## 2022-07-22 DIAGNOSIS — N95.1 MENOPAUSAL STATE: ICD-10-CM

## 2022-07-22 PROCEDURE — 77080 DXA BONE DENSITY AXIAL: CPT

## 2022-07-24 PROBLEM — M81.0 AGE-RELATED OSTEOPOROSIS WITHOUT CURRENT PATHOLOGICAL FRACTURE: Status: ACTIVE | Noted: 2022-07-24

## 2022-07-29 ENCOUNTER — HOSPITAL ENCOUNTER (OUTPATIENT)
Dept: LAB | Facility: MEDICAL CENTER | Age: 72
End: 2022-07-29
Attending: INTERNAL MEDICINE
Payer: MEDICARE

## 2022-07-29 DIAGNOSIS — R73.01 IFG (IMPAIRED FASTING GLUCOSE): ICD-10-CM

## 2022-07-29 DIAGNOSIS — U09.9 POST-COVID-19 SYNDROME MANIFESTING AS CHRONIC FATIGUE: ICD-10-CM

## 2022-07-29 DIAGNOSIS — E55.9 VITAMIN D DEFICIENCY: ICD-10-CM

## 2022-07-29 DIAGNOSIS — U09.9 POST-COVID-19 SYNDROME MANIFESTING AS CHRONIC COUGH: ICD-10-CM

## 2022-07-29 DIAGNOSIS — J45.40 MODERATE PERSISTENT ASTHMA WITHOUT COMPLICATION: ICD-10-CM

## 2022-07-29 DIAGNOSIS — R05.3 POST-COVID-19 SYNDROME MANIFESTING AS CHRONIC COUGH: ICD-10-CM

## 2022-07-29 DIAGNOSIS — U09.9 POST-COVID CHRONIC NEUROLOGIC SYMPTOMS: ICD-10-CM

## 2022-07-29 DIAGNOSIS — E78.5 DYSLIPIDEMIA: ICD-10-CM

## 2022-07-29 DIAGNOSIS — E06.3 HYPOTHYROIDISM, ACQUIRED, AUTOIMMUNE: ICD-10-CM

## 2022-07-29 DIAGNOSIS — Z74.09 POST-COVID-19 SYNDROME MANIFESTING AS CHRONIC DECREASED MOBILITY AND ENDURANCE: ICD-10-CM

## 2022-07-29 DIAGNOSIS — G93.32 POST-COVID-19 SYNDROME MANIFESTING AS CHRONIC FATIGUE: ICD-10-CM

## 2022-07-29 DIAGNOSIS — R29.90 POST-COVID CHRONIC NEUROLOGIC SYMPTOMS: ICD-10-CM

## 2022-07-29 DIAGNOSIS — U09.9 POST-COVID-19 SYNDROME MANIFESTING AS CHRONIC DECREASED MOBILITY AND ENDURANCE: ICD-10-CM

## 2022-07-29 LAB
25(OH)D3 SERPL-MCNC: 85 NG/ML (ref 30–100)
ALBUMIN SERPL BCP-MCNC: 4.4 G/DL (ref 3.2–4.9)
ALBUMIN/GLOB SERPL: 1.6 G/DL
ALP SERPL-CCNC: 78 U/L (ref 30–99)
ALT SERPL-CCNC: 22 U/L (ref 2–50)
ANION GAP SERPL CALC-SCNC: 11 MMOL/L (ref 7–16)
AST SERPL-CCNC: 22 U/L (ref 12–45)
BASOPHILS # BLD AUTO: 0.3 % (ref 0–1.8)
BASOPHILS # BLD: 0.02 K/UL (ref 0–0.12)
BILIRUB SERPL-MCNC: 0.3 MG/DL (ref 0.1–1.5)
BUN SERPL-MCNC: 15 MG/DL (ref 8–22)
CALCIUM SERPL-MCNC: 9.3 MG/DL (ref 8.4–10.2)
CHLORIDE SERPL-SCNC: 103 MMOL/L (ref 96–112)
CHOLEST SERPL-MCNC: 203 MG/DL (ref 100–199)
CO2 SERPL-SCNC: 23 MMOL/L (ref 20–33)
CREAT SERPL-MCNC: 0.51 MG/DL (ref 0.5–1.4)
EOSINOPHIL # BLD AUTO: 0.19 K/UL (ref 0–0.51)
EOSINOPHIL NFR BLD: 3.1 % (ref 0–6.9)
ERYTHROCYTE [DISTWIDTH] IN BLOOD BY AUTOMATED COUNT: 46.9 FL (ref 35.9–50)
ERYTHROCYTE [SEDIMENTATION RATE] IN BLOOD BY WESTERGREN METHOD: 17 MM/HOUR (ref 0–25)
EST. AVERAGE GLUCOSE BLD GHB EST-MCNC: 103 MG/DL
FASTING STATUS PATIENT QL REPORTED: NORMAL
GFR SERPLBLD CREATININE-BSD FMLA CKD-EPI: 98 ML/MIN/1.73 M 2
GLOBULIN SER CALC-MCNC: 2.7 G/DL (ref 1.9–3.5)
GLUCOSE SERPL-MCNC: 101 MG/DL (ref 65–99)
HBA1C MFR BLD: 5.2 % (ref 4–5.6)
HCT VFR BLD AUTO: 41.6 % (ref 37–47)
HDLC SERPL-MCNC: 76 MG/DL
HGB BLD-MCNC: 13.8 G/DL (ref 12–16)
IMM GRANULOCYTES # BLD AUTO: 0.04 K/UL (ref 0–0.11)
IMM GRANULOCYTES NFR BLD AUTO: 0.7 % (ref 0–0.9)
LDLC SERPL CALC-MCNC: 113 MG/DL
LYMPHOCYTES # BLD AUTO: 1.87 K/UL (ref 1–4.8)
LYMPHOCYTES NFR BLD: 30.4 % (ref 22–41)
MCH RBC QN AUTO: 32.5 PG (ref 27–33)
MCHC RBC AUTO-ENTMCNC: 33.2 G/DL (ref 33.6–35)
MCV RBC AUTO: 97.9 FL (ref 81.4–97.8)
MONOCYTES # BLD AUTO: 0.61 K/UL (ref 0–0.85)
MONOCYTES NFR BLD AUTO: 9.9 % (ref 0–13.4)
NEUTROPHILS # BLD AUTO: 3.42 K/UL (ref 2–7.15)
NEUTROPHILS NFR BLD: 55.6 % (ref 44–72)
NRBC # BLD AUTO: 0 K/UL
NRBC BLD-RTO: 0 /100 WBC
PLATELET # BLD AUTO: 208 K/UL (ref 164–446)
PMV BLD AUTO: 11.8 FL (ref 9–12.9)
POTASSIUM SERPL-SCNC: 3.7 MMOL/L (ref 3.6–5.5)
PROT SERPL-MCNC: 7.1 G/DL (ref 6–8.2)
RBC # BLD AUTO: 4.25 M/UL (ref 4.2–5.4)
SODIUM SERPL-SCNC: 137 MMOL/L (ref 135–145)
T4 FREE SERPL-MCNC: 0.88 NG/DL (ref 0.93–1.7)
TRIGL SERPL-MCNC: 71 MG/DL (ref 0–149)
TSH SERPL DL<=0.005 MIU/L-ACNC: 1.79 UIU/ML (ref 0.38–5.33)
WBC # BLD AUTO: 6.2 K/UL (ref 4.8–10.8)

## 2022-07-29 PROCEDURE — 36415 COLL VENOUS BLD VENIPUNCTURE: CPT

## 2022-07-29 PROCEDURE — 85652 RBC SED RATE AUTOMATED: CPT

## 2022-07-29 PROCEDURE — 84439 ASSAY OF FREE THYROXINE: CPT

## 2022-07-29 PROCEDURE — 80061 LIPID PANEL: CPT

## 2022-07-29 PROCEDURE — 82306 VITAMIN D 25 HYDROXY: CPT

## 2022-07-29 PROCEDURE — 80053 COMPREHEN METABOLIC PANEL: CPT

## 2022-07-29 PROCEDURE — 83036 HEMOGLOBIN GLYCOSYLATED A1C: CPT | Mod: GA

## 2022-07-29 PROCEDURE — 84443 ASSAY THYROID STIM HORMONE: CPT

## 2022-07-29 PROCEDURE — 85025 COMPLETE CBC W/AUTO DIFF WBC: CPT

## 2022-08-03 ENCOUNTER — TELEPHONE (OUTPATIENT)
Dept: MEDICAL GROUP | Age: 72
End: 2022-08-03

## 2022-08-03 ENCOUNTER — OFFICE VISIT (OUTPATIENT)
Dept: MEDICAL GROUP | Age: 72
End: 2022-08-03
Payer: MEDICARE

## 2022-08-03 VITALS
WEIGHT: 127.4 LBS | TEMPERATURE: 98.7 F | RESPIRATION RATE: 16 BRPM | SYSTOLIC BLOOD PRESSURE: 134 MMHG | OXYGEN SATURATION: 98 % | BODY MASS INDEX: 25.01 KG/M2 | DIASTOLIC BLOOD PRESSURE: 76 MMHG | HEIGHT: 60 IN | HEART RATE: 78 BPM

## 2022-08-03 DIAGNOSIS — R29.90 POST-COVID CHRONIC NEUROLOGIC SYMPTOMS: ICD-10-CM

## 2022-08-03 DIAGNOSIS — E06.3 HYPOTHYROIDISM, ACQUIRED, AUTOIMMUNE: ICD-10-CM

## 2022-08-03 DIAGNOSIS — E78.5 DYSLIPIDEMIA: ICD-10-CM

## 2022-08-03 DIAGNOSIS — E55.9 VITAMIN D DEFICIENCY: ICD-10-CM

## 2022-08-03 DIAGNOSIS — J45.40 MODERATE PERSISTENT ASTHMA WITHOUT COMPLICATION: ICD-10-CM

## 2022-08-03 DIAGNOSIS — U09.9 POST-COVID CHRONIC NEUROLOGIC SYMPTOMS: ICD-10-CM

## 2022-08-03 DIAGNOSIS — R41.89 BRAIN FOG: ICD-10-CM

## 2022-08-03 PROCEDURE — 99214 OFFICE O/P EST MOD 30 MIN: CPT | Performed by: INTERNAL MEDICINE

## 2022-08-03 RX ORDER — THYROID,PORK 65 MG
65 TABLET ORAL DAILY
Qty: 90 TABLET | Refills: 4 | Status: SHIPPED
Start: 2022-08-03 | End: 2022-08-03

## 2022-08-03 RX ORDER — THYROID,PORK 65 MG
65 TABLET ORAL DAILY
Qty: 90 TABLET | Refills: 4 | Status: SHIPPED | OUTPATIENT
Start: 2022-08-03 | End: 2022-08-03 | Stop reason: CLARIF

## 2022-08-03 RX ORDER — THYROID 15 MG/1
15 TABLET ORAL DAILY
Qty: 90 TABLET | Refills: 4 | Status: SHIPPED | OUTPATIENT
Start: 2022-08-03 | End: 2022-08-03 | Stop reason: SDUPTHER

## 2022-08-03 RX ORDER — THYROID 60 MG/1
60 TABLET ORAL DAILY
Qty: 30 TABLET | Refills: 3 | Status: CANCELLED | OUTPATIENT
Start: 2022-08-03

## 2022-08-03 RX ORDER — THYROID 15 MG/1
15 TABLET ORAL DAILY
Qty: 90 TABLET | Refills: 4 | Status: SHIPPED | OUTPATIENT
Start: 2022-08-03 | End: 2023-04-05 | Stop reason: SDUPTHER

## 2022-08-03 RX ORDER — THYROID 60 MG/1
60 TABLET ORAL DAILY
Qty: 90 TABLET | Refills: 4 | Status: SHIPPED | OUTPATIENT
Start: 2022-08-03 | End: 2023-04-05 | Stop reason: SDUPTHER

## 2022-08-03 ASSESSMENT — ENCOUNTER SYMPTOMS
CARDIOVASCULAR NEGATIVE: 1
EYES NEGATIVE: 1
PSYCHIATRIC NEGATIVE: 1
MUSCULOSKELETAL NEGATIVE: 1
CONSTITUTIONAL NEGATIVE: 1
GASTROINTESTINAL NEGATIVE: 1
NEUROLOGICAL NEGATIVE: 1
WHEEZING: 1

## 2022-08-03 ASSESSMENT — FIBROSIS 4 INDEX: FIB4 SCORE: 1.65

## 2022-08-03 NOTE — TELEPHONE ENCOUNTER
Fax received from pharmacy stating that the patient is not on WP thyroid which was ordered at today's visit. She is on armour thyroid.    WP thyroid is unavailable as well with no release date from the .    Please place an order for armour thyroid. Unable to pend the medication

## 2022-08-03 NOTE — PROGRESS NOTES
Subjective     Yaz Farooq is a 72 y.o. 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  She is doing better on the Advair inhaler which was increased and she is doing well on her thyroid medication with no change in most recent TSH and T4 were normal.    Current Outpatient Medications   Medication Sig Dispense Refill   • Thyroid 16.25 MG Tab Take 1 Tablet by mouth every day. In addition to 65 mg thyroid pill 90 Tablet 4   • thyroid (WP THYROID) 65 MG tablet Take 1 Tablet by mouth every day. In addition to 16.25 mg thyroid pill 90 Tablet 4   • fluticasone-salmeterol (ADVAIR) 250-50 MCG/ACT AEROSOL POWDER, BREATH ACTIVATED Inhale 1 Puff 2 times a day. 1 Each 3   • albuterol 108 (90 Base) MCG/ACT Aero Soln inhalation aerosol Inhale 2 Puffs every four hours as needed for Shortness of Breath. 1 Each 5   • azelastine (ASTELIN) 137 MCG/SPRAY nasal spray Administer 2 Sprays into affected nostril(S) 2 times a day. 30 mL 0   • NAPHCON-A 0.025-0.3 % ophthalmic solution place 1 drop into both eyes 4 times a day 5 mL 0     No current facility-administered medications for this visit.             HPI    Review of Systems   Constitutional: Negative.    HENT: Negative.    Eyes: Negative.    Respiratory: Positive for wheezing.    Cardiovascular: Negative.    Gastrointestinal: Negative.    Genitourinary: Negative.    Musculoskeletal: Negative.    Skin: Negative.    Neurological: Negative.         Mild difficulty concentrating.   Endo/Heme/Allergies: Negative.    Psychiatric/Behavioral: Negative.               Objective     /76   Pulse 78   Temp 37.1 °C (98.7 °F) (Temporal)   Resp 16   Ht 1.524 m (5')   Wt 57.8 kg (127 lb 6.4 oz)   SpO2 98%   BMI 24.88 kg/m²      Physical Exam  Vitals reviewed.   Constitutional:       General: She is  not in acute distress.     Appearance: She is well-developed. She is not diaphoretic.   HENT:      Head: Normocephalic and atraumatic.      Right Ear: External ear normal.      Left Ear: External ear normal.      Nose: Nose normal.      Mouth/Throat:      Pharynx: No oropharyngeal exudate.   Eyes:      General: No scleral icterus.        Right eye: No discharge.         Left eye: No discharge.      Conjunctiva/sclera: Conjunctivae normal.      Pupils: Pupils are equal, round, and reactive to light.   Neck:      Thyroid: No thyromegaly.      Vascular: No JVD.      Trachea: No tracheal deviation.   Cardiovascular:      Rate and Rhythm: Normal rate and regular rhythm.      Heart sounds: Normal heart sounds. No murmur heard.    No friction rub. No gallop.   Pulmonary:      Effort: Pulmonary effort is normal. No respiratory distress.      Breath sounds: No stridor. Wheezing present. No rales.   Chest:      Chest wall: No tenderness.   Abdominal:      General: Bowel sounds are normal. There is no distension.      Palpations: Abdomen is soft. There is no mass.      Tenderness: There is no abdominal tenderness. There is no guarding or rebound.   Musculoskeletal:         General: No tenderness. Normal range of motion.      Cervical back: Normal range of motion and neck supple.   Lymphadenopathy:      Cervical: No cervical adenopathy.   Skin:     General: Skin is warm and dry.      Coloration: Skin is not pale.      Findings: No erythema or rash.   Neurological:      Mental Status: She is alert and oriented to person, place, and time.      Cranial Nerves: No cranial nerve deficit.      Motor: No abnormal muscle tone.      Coordination: Coordination normal.      Deep Tendon Reflexes: Reflexes are normal and symmetric. Reflexes normal.   Psychiatric:         Behavior: Behavior normal.         Thought Content: Thought content normal.         Judgment: Judgment normal.       Hospital Outpatient Visit on 07/29/2022   Component  Date Value   • 25-Hydroxy   Vitamin D 25 07/29/2022 85    • Glycohemoglobin 07/29/2022 5.2    • Est Avg Glucose 07/29/2022 103    • Sed Rate Westergren 07/29/2022 17    • WBC 07/29/2022 6.2    • RBC 07/29/2022 4.25    • Hemoglobin 07/29/2022 13.8    • Hematocrit 07/29/2022 41.6    • MCV 07/29/2022 97.9 (A)   • MCH 07/29/2022 32.5    • MCHC 07/29/2022 33.2 (A)   • RDW 07/29/2022 46.9    • Platelet Count 07/29/2022 208    • MPV 07/29/2022 11.8    • Neutrophils-Polys 07/29/2022 55.60    • Lymphocytes 07/29/2022 30.40    • Monocytes 07/29/2022 9.90    • Eosinophils 07/29/2022 3.10    • Basophils 07/29/2022 0.30    • Immature Granulocytes 07/29/2022 0.70    • Nucleated RBC 07/29/2022 0.00    • Neutrophils (Absolute) 07/29/2022 3.42    • Lymphs (Absolute) 07/29/2022 1.87    • Monos (Absolute) 07/29/2022 0.61    • Eos (Absolute) 07/29/2022 0.19    • Baso (Absolute) 07/29/2022 0.02    • Immature Granulocytes (a* 07/29/2022 0.04    • NRBC (Absolute) 07/29/2022 0.00    • Cholesterol,Tot 07/29/2022 203 (A)   • Triglycerides 07/29/2022 71    • HDL 07/29/2022 76    • LDL 07/29/2022 113 (A)   • Sodium 07/29/2022 137    • Potassium 07/29/2022 3.7    • Chloride 07/29/2022 103    • Co2 07/29/2022 23    • Anion Gap 07/29/2022 11.0    • Glucose 07/29/2022 101 (A)   • Bun 07/29/2022 15    • Creatinine 07/29/2022 0.51    • Calcium 07/29/2022 9.3    • AST(SGOT) 07/29/2022 22    • ALT(SGPT) 07/29/2022 22    • Alkaline Phosphatase 07/29/2022 78    • Total Bilirubin 07/29/2022 0.3    • Albumin 07/29/2022 4.4    • Total Protein 07/29/2022 7.1    • Globulin 07/29/2022 2.7    • A-G Ratio 07/29/2022 1.6    • Free T-4 07/29/2022 0.88 (A)   • TSH 07/29/2022 1.790    • Fasting Status 07/29/2022 Fasting    • GFR (CKD-EPI) 07/29/2022 98       Lab Results   Component Value Date/Time    HBA1C 5.2 07/29/2022 08:11 AM    HBA1C 5.4 11/17/2021 08:20 AM     Lab Results   Component Value Date/Time    SODIUM 137 07/29/2022 08:11 AM    POTASSIUM 3.7  07/29/2022 08:11 AM    CHLORIDE 103 07/29/2022 08:11 AM    CO2 23 07/29/2022 08:11 AM    GLUCOSE 101 (H) 07/29/2022 08:11 AM    BUN 15 07/29/2022 08:11 AM    CREATININE 0.51 07/29/2022 08:11 AM    ALKPHOSPHAT 78 07/29/2022 08:11 AM    ASTSGOT 22 07/29/2022 08:11 AM    ALTSGPT 22 07/29/2022 08:11 AM    TBILIRUBIN 0.3 07/29/2022 08:11 AM     No results found for: INR  Lab Results   Component Value Date/Time    CHOLSTRLTOT 203 (H) 07/29/2022 08:11 AM     (H) 07/29/2022 08:11 AM    HDL 76 07/29/2022 08:11 AM    TRIGLYCERIDE 71 07/29/2022 08:11 AM       No results found for: TESTOSTERONE  No results found for: TSH  Lab Results   Component Value Date/Time    FREET4 0.88 (L) 07/29/2022 08:11 AM    FREET4 0.81 (L) 09/14/2021 04:42 PM     No results found for: URICACID  No components found for: VITB12  Lab Results   Component Value Date/Time    25HYDROXY 85 07/29/2022 08:11 AM    25HYDROXY 70 11/17/2021 08:20 AM   '                          Assessment & Plan         1. Hypothyroidism, acquired, autoimmune   Under good control. Continue same regimen.]  - TSH; Future  - Thyroid 16.25 MG Tab; Take 1 Tablet by mouth every day. In addition to 65 mg thyroid pill  Dispense: 90 Tablet; Refill: 4  - thyroid (WP THYROID) 65 MG tablet; Take 1 Tablet by mouth every day. In addition to 16.25 mg thyroid pill  Dispense: 90 Tablet; Refill: 4    2. Brain fog  No severe treatment for this patient reassured that will eventually resolve the next 3 to 6 months.  Recheck in 6 months.    3. Post-COVID chronic neurologic symptoms  As above    4. Moderate persistent asthma without complication  Much improved with only minimal wheezing at this time.  Continue Advair    5. Dyslipidemia  Good control continue current regimen  - TSH; Future  - FREE THYROXINE; Future  - Comp Metabolic Panel; Future  - Lipid Profile; Future  - CBC WITH DIFFERENTIAL; Future    6. Vitamin D deficiency       controlled continue same regimen  - VITAMIN D,25 HYDROXY;  Future

## 2022-08-29 DIAGNOSIS — H10.13 ALLERGIC CONJUNCTIVITIS OF BOTH EYES: ICD-10-CM

## 2022-08-31 RX ORDER — NAPHAZOLINE HYDROCHLORIDE AND PHENIRAMINE MALEATE .25; 3 MG/ML; MG/ML
SOLUTION/ DROPS OPHTHALMIC
Qty: 10 ML | Refills: 0 | Status: SHIPPED | OUTPATIENT
Start: 2022-08-31 | End: 2023-12-06 | Stop reason: SDUPTHER

## 2022-11-03 ENCOUNTER — TELEPHONE (OUTPATIENT)
Dept: MEDICAL GROUP | Age: 72
End: 2022-11-03

## 2022-11-21 ENCOUNTER — HOSPITAL ENCOUNTER (OUTPATIENT)
Dept: LAB | Facility: MEDICAL CENTER | Age: 72
End: 2022-11-21
Attending: INTERNAL MEDICINE
Payer: MEDICARE

## 2022-11-21 DIAGNOSIS — E78.5 DYSLIPIDEMIA: ICD-10-CM

## 2022-11-21 DIAGNOSIS — E06.3 HYPOTHYROIDISM, ACQUIRED, AUTOIMMUNE: ICD-10-CM

## 2022-11-21 DIAGNOSIS — E55.9 VITAMIN D DEFICIENCY: ICD-10-CM

## 2022-11-21 DIAGNOSIS — R73.01 IFG (IMPAIRED FASTING GLUCOSE): ICD-10-CM

## 2022-11-21 LAB
25(OH)D3 SERPL-MCNC: 72 NG/ML (ref 30–100)
ALBUMIN SERPL BCP-MCNC: 4.4 G/DL (ref 3.2–4.9)
ALBUMIN/GLOB SERPL: 1.5 G/DL
ALP SERPL-CCNC: 79 U/L (ref 30–99)
ALT SERPL-CCNC: 19 U/L (ref 2–50)
ANION GAP SERPL CALC-SCNC: 8 MMOL/L (ref 7–16)
AST SERPL-CCNC: 23 U/L (ref 12–45)
BASOPHILS # BLD AUTO: 0.4 % (ref 0–1.8)
BASOPHILS # BLD: 0.02 K/UL (ref 0–0.12)
BILIRUB SERPL-MCNC: 0.5 MG/DL (ref 0.1–1.5)
BUN SERPL-MCNC: 14 MG/DL (ref 8–22)
CALCIUM SERPL-MCNC: 9.7 MG/DL (ref 8.4–10.2)
CHLORIDE SERPL-SCNC: 106 MMOL/L (ref 96–112)
CHOLEST SERPL-MCNC: 215 MG/DL (ref 100–199)
CO2 SERPL-SCNC: 28 MMOL/L (ref 20–33)
CREAT SERPL-MCNC: 0.48 MG/DL (ref 0.5–1.4)
EOSINOPHIL # BLD AUTO: 0.12 K/UL (ref 0–0.51)
EOSINOPHIL NFR BLD: 2.2 % (ref 0–6.9)
ERYTHROCYTE [DISTWIDTH] IN BLOOD BY AUTOMATED COUNT: 44.5 FL (ref 35.9–50)
EST. AVERAGE GLUCOSE BLD GHB EST-MCNC: 108 MG/DL
FASTING STATUS PATIENT QL REPORTED: NORMAL
GFR SERPLBLD CREATININE-BSD FMLA CKD-EPI: 100 ML/MIN/1.73 M 2
GLOBULIN SER CALC-MCNC: 3 G/DL (ref 1.9–3.5)
GLUCOSE SERPL-MCNC: 94 MG/DL (ref 65–99)
HBA1C MFR BLD: 5.4 % (ref 4–5.6)
HCT VFR BLD AUTO: 45 % (ref 37–47)
HDLC SERPL-MCNC: 95 MG/DL
HGB BLD-MCNC: 14.9 G/DL (ref 12–16)
IMM GRANULOCYTES # BLD AUTO: 0.04 K/UL (ref 0–0.11)
IMM GRANULOCYTES NFR BLD AUTO: 0.7 % (ref 0–0.9)
LDLC SERPL CALC-MCNC: 97 MG/DL
LYMPHOCYTES # BLD AUTO: 1.75 K/UL (ref 1–4.8)
LYMPHOCYTES NFR BLD: 32.2 % (ref 22–41)
MCH RBC QN AUTO: 32.4 PG (ref 27–33)
MCHC RBC AUTO-ENTMCNC: 33.1 G/DL (ref 33.6–35)
MCV RBC AUTO: 97.8 FL (ref 81.4–97.8)
MONOCYTES # BLD AUTO: 0.56 K/UL (ref 0–0.85)
MONOCYTES NFR BLD AUTO: 10.3 % (ref 0–13.4)
NEUTROPHILS # BLD AUTO: 2.95 K/UL (ref 2–7.15)
NEUTROPHILS NFR BLD: 54.2 % (ref 44–72)
NRBC # BLD AUTO: 0 K/UL
NRBC BLD-RTO: 0 /100 WBC
PLATELET # BLD AUTO: 216 K/UL (ref 164–446)
PMV BLD AUTO: 11.8 FL (ref 9–12.9)
POTASSIUM SERPL-SCNC: 4.7 MMOL/L (ref 3.6–5.5)
PROT SERPL-MCNC: 7.4 G/DL (ref 6–8.2)
RBC # BLD AUTO: 4.6 M/UL (ref 4.2–5.4)
SODIUM SERPL-SCNC: 142 MMOL/L (ref 135–145)
T4 FREE SERPL-MCNC: 0.87 NG/DL (ref 0.93–1.7)
TRIGL SERPL-MCNC: 114 MG/DL (ref 0–149)
TSH SERPL DL<=0.005 MIU/L-ACNC: 2.5 UIU/ML (ref 0.38–5.33)
WBC # BLD AUTO: 5.4 K/UL (ref 4.8–10.8)

## 2022-11-21 PROCEDURE — 85025 COMPLETE CBC W/AUTO DIFF WBC: CPT

## 2022-11-21 PROCEDURE — 84443 ASSAY THYROID STIM HORMONE: CPT

## 2022-11-21 PROCEDURE — 84439 ASSAY OF FREE THYROXINE: CPT

## 2022-11-21 PROCEDURE — 83036 HEMOGLOBIN GLYCOSYLATED A1C: CPT

## 2022-11-21 PROCEDURE — 36415 COLL VENOUS BLD VENIPUNCTURE: CPT

## 2022-11-21 PROCEDURE — 80053 COMPREHEN METABOLIC PANEL: CPT

## 2022-11-21 PROCEDURE — 80061 LIPID PANEL: CPT

## 2022-11-21 PROCEDURE — 82306 VITAMIN D 25 HYDROXY: CPT

## 2022-11-29 ENCOUNTER — APPOINTMENT (OUTPATIENT)
Dept: SPEECH THERAPY | Facility: REHABILITATION | Age: 72
End: 2022-11-29
Payer: MEDICARE

## 2022-12-01 ENCOUNTER — OFFICE VISIT (OUTPATIENT)
Dept: MEDICAL GROUP | Age: 72
End: 2022-12-01
Payer: MEDICARE

## 2022-12-01 VITALS
WEIGHT: 128 LBS | RESPIRATION RATE: 16 BRPM | HEART RATE: 83 BPM | TEMPERATURE: 98.1 F | SYSTOLIC BLOOD PRESSURE: 136 MMHG | HEIGHT: 60 IN | DIASTOLIC BLOOD PRESSURE: 70 MMHG | BODY MASS INDEX: 25.13 KG/M2 | OXYGEN SATURATION: 97 %

## 2022-12-01 DIAGNOSIS — E78.5 DYSLIPIDEMIA: ICD-10-CM

## 2022-12-01 DIAGNOSIS — J20.8 ACUTE BRONCHITIS DUE TO OTHER SPECIFIED ORGANISMS: ICD-10-CM

## 2022-12-01 DIAGNOSIS — Z00.00 MEDICARE ANNUAL WELLNESS VISIT, SUBSEQUENT: ICD-10-CM

## 2022-12-01 DIAGNOSIS — E06.3 HYPOTHYROIDISM, ACQUIRED, AUTOIMMUNE: ICD-10-CM

## 2022-12-01 DIAGNOSIS — R73.01 IFG (IMPAIRED FASTING GLUCOSE): ICD-10-CM

## 2022-12-01 DIAGNOSIS — E55.9 VITAMIN D DEFICIENCY: ICD-10-CM

## 2022-12-01 DIAGNOSIS — J10.1 INFLUENZA A H1N1 INFECTION: ICD-10-CM

## 2022-12-01 LAB
EXTERNAL QUALITY CONTROL: NORMAL
FLUAV+FLUBV AG SPEC QL IA: POSITIVE
INT CON NEG: NEGATIVE
INT CON POS: POSITIVE
SARS-COV+SARS-COV-2 AG RESP QL IA.RAPID: NEGATIVE

## 2022-12-01 PROCEDURE — 87426 SARSCOV CORONAVIRUS AG IA: CPT | Performed by: INTERNAL MEDICINE

## 2022-12-01 PROCEDURE — 87804 INFLUENZA ASSAY W/OPTIC: CPT | Performed by: INTERNAL MEDICINE

## 2022-12-01 PROCEDURE — 99214 OFFICE O/P EST MOD 30 MIN: CPT | Mod: 25,CS | Performed by: INTERNAL MEDICINE

## 2022-12-01 PROCEDURE — G0439 PPPS, SUBSEQ VISIT: HCPCS | Mod: 25,CS | Performed by: INTERNAL MEDICINE

## 2022-12-01 RX ORDER — OSELTAMIVIR PHOSPHATE 75 MG/1
75 CAPSULE ORAL 2 TIMES DAILY
Qty: 10 CAPSULE | Refills: 0 | Status: SHIPPED | OUTPATIENT
Start: 2022-12-01 | End: 2023-04-05

## 2022-12-01 RX ORDER — CODEINE PHOSPHATE AND GUAIFENESIN 10; 100 MG/5ML; MG/5ML
5 SOLUTION ORAL EVERY 4 HOURS PRN
Qty: 473 ML | Refills: 0 | Status: SHIPPED | OUTPATIENT
Start: 2022-12-01 | End: 2022-12-11

## 2022-12-01 ASSESSMENT — PATIENT HEALTH QUESTIONNAIRE - PHQ9: CLINICAL INTERPRETATION OF PHQ2 SCORE: 0

## 2022-12-01 ASSESSMENT — ENCOUNTER SYMPTOMS
PSYCHIATRIC NEGATIVE: 1
MYALGIAS: 1
COUGH: 1
EYES NEGATIVE: 1
CARDIOVASCULAR NEGATIVE: 1
GASTROINTESTINAL NEGATIVE: 1
GENERAL WELL-BEING: GOOD
SORE THROAT: 1
HEADACHES: 1

## 2022-12-01 ASSESSMENT — ACTIVITIES OF DAILY LIVING (ADL): BATHING_REQUIRES_ASSISTANCE: 0

## 2022-12-01 ASSESSMENT — FIBROSIS 4 INDEX: FIB4 SCORE: 1.78

## 2022-12-01 NOTE — PROGRESS NOTES
Chief Complaint   Patient presents with    Annual Exam     AWV lab review / cough        HPI:  Yaz is a 72 y.o. here for Medicare Annual Wellness Visit           Patient Active Problem List    Diagnosis Date Noted    Brain fog- post covid june 2022 08/03/2022    Age-related osteoporosis without current pathological fracture 07/24/2022    Post-COVID-19 syndrome manifesting as chronic fatigue 07/12/2022    Post-COVID-19 syndrome manifesting as chronic cough 07/12/2022    Post-COVID chronic neurologic symptoms 07/12/2022    Post-COVID-19 syndrome manifesting as chronic decreased mobility and endurance 07/12/2022    Moderate persistent asthma without complication 07/12/2022    Acute bronchitis due to COVID-19 virus -6/17/2022 07/12/2022    Primary insomnia 11/29/2021    IFG (impaired fasting glucose) 08/13/2020    Environmental and seasonal allergies 08/13/2020    Allergic conjunctivitis of both eyes 08/13/2020    Hypothyroidism, acquired, autoimmune 06/08/2020    Grieving 05/20/2020    GERD (gastroesophageal reflux disease) 05/20/2020    Dysphagia 10/11/2019       Current Outpatient Medications   Medication Sig Dispense Refill    NAPHCON-A 0.025-0.3 % ophthalmic solution PLACE 1 DROP INTO BOTH EYES 4 TIMES DAILY 10 mL 0    thyroid (ARMOUR THYROID) 60 MG Tab Take 1 Tablet by mouth every day. 90 Tablet 4    thyroid (ARMOUR THYROID) 15 MG Tab Take 1 Tablet by mouth every day. To take in addition to 60 mg [place] Thyroid pill daily 90 Tablet 4    fluticasone-salmeterol (ADVAIR) 250-50 MCG/ACT AEROSOL POWDER, BREATH ACTIVATED Inhale 1 Puff 2 times a day. 1 Each 3    albuterol 108 (90 Base) MCG/ACT Aero Soln inhalation aerosol Inhale 2 Puffs every four hours as needed for Shortness of Breath. 1 Each 5    azelastine (ASTELIN) 137 MCG/SPRAY nasal spray Administer 2 Sprays into affected nostril(S) 2 times a day. 30 mL 0     No current facility-administered medications for this visit.        Patient is taking medications as  noted in medication list.  Current supplements as per medication list.     Allergies: Augmentin, Compazine, and Amoxicillin-clavulanic acid [amoxicillin-pot clavulanate]    Current social contact/activities:      Is patient current with immunizations? Yes.    She  reports that she has never smoked. She has never used smokeless tobacco. She reports current alcohol use of about 1.8 - 2.4 oz per week. She reports that she does not use drugs.  Counseling given: Not Answered      ROS:    Gait: Uses no assistive device   Ostomy: No   Other tubes: No   Amputations: No   Chronic oxygen use No    Last eye exam 10/2022   Wears hearing aids: No   : Denies any urinary leakage during the last 6 months    Screening:    Depression Screening  Little interest or pleasure in doing things?  0 - not at all  Feeling down, depressed, or hopeless? 0 - not at all  Patient Health Questionnaire Score: 0    If depressive symptoms identified deferred to follow up visit unless specifically addressed in assessment and plan.    Interpretation of PHQ-9 Total Score   Score Severity   1-4 No Depression   5-9 Mild Depression   10-14 Moderate Depression   15-19 Moderately Severe Depression   20-27 Severe Depression    Screening for Cognitive Impairment  Three Minute Recall (daughter, heaven, mountain)  3/3    Dario clock face with all 12 numbers and set the hands to show 10 past 11.  Yes    If cognitive concerns identified, deferred for follow up unless specifically addressed in assessment and plan.    Fall Risk Assessment  Has the patient had two or more falls in the last year or any fall with injury in the last year?  No  If fall risk identified, deferred for follow up unless specifically addressed in assessment and plan.    Safety Assessment  Throw rugs on floor.  Yes  Handrails on all stairs.  No  Good lighting in all hallways.  Yes  Difficulty hearing.  Yes  Patient counseled about all safety risks that were identified.    Functional Assessment  ADLs  Are there any barriers preventing you from cooking for yourself or meeting nutritional needs?  No.    Are there any barriers preventing you from driving safely or obtaining transportation?  No.    Are there any barriers preventing you from using a telephone or calling for help?  No.    Are there any barriers preventing you from shopping?  No.    Are there any barriers preventing you from taking care of your own finances?  No.    Are there any barriers preventing you from managing your medications?  No.    Are there any barriers preventing you from showering, bathing or dressing yourself?  No.    Are you currently engaging in any exercise or physical activity?  Yes.     What is your perception of your health?  Good.    Advance Care Planning  Do you have an Advance Directive, Living Will, Durable Power of , or POLST? Yes  Advance Directive Living Will Durable Power of         Health Maintenance Summary            Overdue - IMM HEP B VACCINE (1 of 3 - 3-dose series) Overdue - never done      No completion history exists for this topic.              Overdue - COVID-19 Vaccine (3 - Booster for Pfizer series) Overdue since 9/22/2021 07/28/2021  Imm Admin: PFIZER PURPLE CAP SARS-COV-2 VACCINATION (12+)    07/07/2021  Imm Admin: PFIZER PURPLE CAP SARS-COV-2 VACCINATION (12+)              Overdue - Annual Wellness Visit (Every 366 Days) Overdue since 11/30/2022 11/29/2021  Level of Service: [place] INITIAL ANNUAL WELLNESS VISIT-INCLUDES PPPS    11/29/2021  Visit Dx: Medicare annual wellness visit, subsequent              MAMMOGRAM (Every 2 Years) Tentatively due on 4/7/2024 04/07/2022  MA-SCREENING MAMMO BILAT W/TOMOSYNTHESIS W/CAD    03/10/2021  MA-SCREENING MAMMO BILAT W/TOMOSYNTHESIS W/CAD    03/05/2020  MA-SCREENING MAMMO BILAT W/TOMOSYNTHESIS W/CAD    03/04/2019  MA-SCREENING MAMMO BILAT W/TOMOSYNTHESIS W/CAD    03/01/2018  MA-MAMMO SCREENING BILAT W/STEPHY W/CAD    Only the first 5  history entries have been loaded, but more history exists.              BONE DENSITY (Every 5 Years) Next due on 7/22/2027 07/22/2022  DS-BONE DENSITY STUDY (DEXA)    11/07/2011  DS-BONE DENSITY STUDY (DEXA)              COLORECTAL CANCER SCREENING (COLONOSCOPY - Every 10 Years) Next due on 10/9/2027      10/09/2017  COLONOSCOPY (Done)              IMM DTaP/Tdap/Td Vaccine (2 - Td or Tdap) Next due on 6/13/2029 06/13/2019  Imm Admin: Tdap Vaccine              IMM PNEUMOCOCCAL VACCINE: 65+ Years (Series Information) Completed      10/02/2017  Imm Admin: Pneumococcal polysaccharide vaccine (PPSV-23)    09/28/2016  Imm Admin: Pneumococcal Conjugate Vaccine (Prevnar/PCV-13)              HEPATITIS C SCREENING  Completed      10/21/2020  HEP C VIRUS ANTIBODY              IMM INFLUENZA (Series Information) Completed      11/01/2022  Outside Immunization: Influenza Quad Adjuvanted    10/15/2021  Imm Admin: Influenza, Unspecified - HISTORICAL DATA    10/05/2020  Imm Admin: Influenza, Unspecified - HISTORICAL DATA    10/04/2018  Imm Admin: Influenza, Unspecified - HISTORICAL DATA    09/22/2017  Imm Admin: Influenza Vaccine Quad Inj (Pf)    Only the first 5 history entries have been loaded, but more history exists.              IMM MENINGOCOCCAL ACWY VACCINE (Series Information) Aged Out      No completion history exists for this topic.              Discontinued - IMM ZOSTER VACCINES  Discontinued      10/04/2019  Imm Admin: Zoster Vaccine Recombinant (RZV) (SHINGRIX)    02/10/2015  Imm Admin: Zoster Vaccine Live (ZVL) (Zostavax) - HISTORICAL DATA                    Patient Care Team:  Keon Meza M.D. as PCP - General (Internal Medicine)  Claude K Lardinois, M.D. as Consulting Physician (Endocrinology)  Jose Abrams M.D. as Attending Team Physician (Internal Medicine)  Keon Vilchis M.D. (Ophthalmology)    Social History     Tobacco Use    Smoking status: Never    Smokeless tobacco: Never   Vaping Use     Vaping Use: Never used   Substance Use Topics    Alcohol use: Yes     Alcohol/week: 1.8 - 2.4 oz     Types: 3 - 4 Glasses of wine per week     Comment: socc    Drug use: No     Family History   Problem Relation Age of Onset    Heart Disease Mother         MI    Heart Disease Father 80        MI    Cancer Sister         breast cancer    Breast Cancer Sister      She  has a past medical history of Anxiety, GERD (gastroesophageal reflux disease), Osteoporosis, and Thyroid disease (2019).   Past Surgical History:   Procedure Laterality Date    OTHER ORTHOPEDIC SURGERY         Exam:   /70 (BP Location: Right arm, Patient Position: Sitting, BP Cuff Size: Adult)   Pulse 83   Temp 36.7 °C (98.1 °F) (Temporal)   Resp 16   Ht 1.524 m (5')   Wt 58.1 kg (128 lb)   SpO2 97%  Body mass index is 25 kg/m².    Hearing good.    Dentition good  Alert, oriented in no acute distress.  Eye contact is good, speech goal directed, affect calm      Assessment and Plan. The following treatment and monitoring plan is recommended:    See provider note     Services suggested: No services needed at this time  Health Care Screening recommendations as per orders if indicated.  Referrals offered: PT/OT/Nutrition counseling/Behavioral Health/Smoking cessation as per orders if indicated.    Discussion today about general wellness and lifestyle habits:    Prevent falls and reduce trip hazards; Cautioned about securing or removing rugs.  Have a working fire alarm and carbon monoxide detector;   Engage in regular physical activity and social activities     Follow-up: No follow-ups on file.

## 2022-12-02 NOTE — PROGRESS NOTES
Subjective     Yaz Farooq is a 72 y.o. female who presents with Annual Exam (AWV lab review / cough )  And patient also complained after completing the annual exam/wellness exam she complained of a 2 to 3-day history of fever chills nonproductive cough headache myalgias scratchy throat and rhinorrhea.  Current on influenza and vaccine but needs COVID boosters.              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    Post-COVID-19 syndrome manifesting as chronic cough    Post-COVID chronic neurologic symptoms    Post-COVID-19 syndrome manifesting as chronic decreased mobility and endurance    Moderate persistent asthma without complication    Acute bronchitis due to COVID-19 virus -6/17/2022    Age-related osteoporosis without current pathological fracture    Brain fog- post covid june 2022    Dyslipidemia     Outpatient Medications Prior to Visit   Medication Sig Dispense Refill    NAPHCON-A 0.025-0.3 % ophthalmic solution PLACE 1 DROP INTO BOTH EYES 4 TIMES DAILY 10 mL 0    thyroid (ARMOUR THYROID) 60 MG Tab Take 1 Tablet by mouth every day. 90 Tablet 4    thyroid (ARMOUR THYROID) 15 MG Tab Take 1 Tablet by mouth every day. To take in addition to 60 mg [place] Thyroid pill daily 90 Tablet 4    fluticasone-salmeterol (ADVAIR) 250-50 MCG/ACT AEROSOL POWDER, BREATH ACTIVATED Inhale 1 Puff 2 times a day. 1 Each 3    albuterol 108 (90 Base) MCG/ACT Aero Soln inhalation aerosol Inhale 2 Puffs every four hours as needed for Shortness of Breath. 1 Each 5    azelastine (ASTELIN) 137 MCG/SPRAY nasal spray Administer 2 Sprays into affected nostril(S) 2 times a day. 30 mL 0     No facility-administered medications prior to  visit.     Allergies   Allergen Reactions    Augmentin      Pain in the stomach  Other reaction(s): Abdominal Pain    Compazine Shortness of Breath and Swelling     Other reaction(s): Respiratory Distress    Amoxicillin-Clavulanic Acid [Amoxicillin-Pot Clavulanate]      Pain in stomach     Hospital Outpatient Visit on 11/21/2022   Component Date Value    TSH 11/21/2022 2.500     Free T-4 11/21/2022 0.87 (L)     Sodium 11/21/2022 142     Potassium 11/21/2022 4.7     Chloride 11/21/2022 106     Co2 11/21/2022 28     Anion Gap 11/21/2022 8.0     Glucose 11/21/2022 94     Bun 11/21/2022 14     Creatinine 11/21/2022 0.48 (L)     Calcium 11/21/2022 9.7     AST(SGOT) 11/21/2022 23     ALT(SGPT) 11/21/2022 19     Alkaline Phosphatase 11/21/2022 79     Total Bilirubin 11/21/2022 0.5     Albumin 11/21/2022 4.4     Total Protein 11/21/2022 7.4     Globulin 11/21/2022 3.0     A-G Ratio 11/21/2022 1.5     Cholesterol,Tot 11/21/2022 215 (H)     Triglycerides 11/21/2022 114     HDL 11/21/2022 95     LDL 11/21/2022 97     WBC 11/21/2022 5.4     RBC 11/21/2022 4.60     Hemoglobin 11/21/2022 14.9     Hematocrit 11/21/2022 45.0     MCV 11/21/2022 97.8     MCH 11/21/2022 32.4     MCHC 11/21/2022 33.1 (L)     RDW 11/21/2022 44.5     Platelet Count 11/21/2022 216     MPV 11/21/2022 11.8     Neutrophils-Polys 11/21/2022 54.20     Lymphocytes 11/21/2022 32.20     Monocytes 11/21/2022 10.30     Eosinophils 11/21/2022 2.20     Basophils 11/21/2022 0.40     Immature Granulocytes 11/21/2022 0.70     Nucleated RBC 11/21/2022 0.00     Neutrophils (Absolute) 11/21/2022 2.95     Lymphs (Absolute) 11/21/2022 1.75     Monos (Absolute) 11/21/2022 0.56     Eos (Absolute) 11/21/2022 0.12     Baso (Absolute) 11/21/2022 0.02     Immature Granulocytes (a* 11/21/2022 0.04     NRBC (Absolute) 11/21/2022 0.00     25-Hydroxy   Vitamin D 25 11/21/2022 72     Fasting Status 11/21/2022 Fasting     GFR (CKD-EPI) 11/21/2022 100     Glycohemoglobin 11/21/2022 5.4      Est Avg Glucose 11/21/2022 108       Lab Results   Component Value Date/Time    HBA1C 5.4 11/21/2022 08:31 AM    HBA1C 5.2 07/29/2022 08:11 AM     Lab Results   Component Value Date/Time    SODIUM 142 11/21/2022 08:31 AM    POTASSIUM 4.7 11/21/2022 08:31 AM    CHLORIDE 106 11/21/2022 08:31 AM    CO2 28 11/21/2022 08:31 AM    GLUCOSE 94 11/21/2022 08:31 AM    BUN 14 11/21/2022 08:31 AM    CREATININE 0.48 (L) 11/21/2022 08:31 AM    ALKPHOSPHAT 79 11/21/2022 08:31 AM    ASTSGOT 23 11/21/2022 08:31 AM    ALTSGPT 19 11/21/2022 08:31 AM    TBILIRUBIN 0.5 11/21/2022 08:31 AM     No results found for: INR  Lab Results   Component Value Date/Time    CHOLSTRLTOT 215 (H) 11/21/2022 08:31 AM    LDL 97 11/21/2022 08:31 AM    HDL 95 11/21/2022 08:31 AM    TRIGLYCERIDE 114 11/21/2022 08:31 AM       No results found for: TESTOSTERONE  No results found for: TSH  Lab Results   Component Value Date/Time    FREET4 0.87 (L) 11/21/2022 08:31 AM    FREET4 0.88 (L) 07/29/2022 08:11 AM     No results found for: URICACID  No components found for: VITB12  Lab Results   Component Value Date/Time    25HYDROXY 72 11/21/2022 08:31 AM    25HYDROXY 85 07/29/2022 08:11 AM             HPI    Review of Systems   Constitutional:  Positive for malaise/fatigue.   HENT:  Positive for congestion and sore throat.    Eyes: Negative.    Respiratory:  Positive for cough.    Cardiovascular: Negative.    Gastrointestinal: Negative.    Genitourinary: Negative.    Musculoskeletal:  Positive for myalgias.   Skin: Negative.    Neurological:  Positive for headaches.   Endo/Heme/Allergies: Negative.    Psychiatric/Behavioral: Negative.              Objective     /70 (BP Location: Right arm, Patient Position: Sitting, BP Cuff Size: Adult)   Pulse 83   Temp 36.7 °C (98.1 °F) (Temporal)   Resp 16   Ht 1.524 m (5')   Wt 58.1 kg (128 lb)   SpO2 97%   BMI 25.00 kg/m²      Physical Exam  Vitals reviewed.   Constitutional:       General: She is not in acute  distress.     Appearance: She is well-developed. She is not diaphoretic.   HENT:      Head: Normocephalic and atraumatic.      Right Ear: External ear normal.      Left Ear: External ear normal.      Nose: Congestion and rhinorrhea present.      Mouth/Throat:      Pharynx: Posterior oropharyngeal erythema present. No oropharyngeal exudate.   Eyes:      General: No scleral icterus.        Right eye: No discharge.         Left eye: No discharge.      Conjunctiva/sclera: Conjunctivae normal.      Pupils: Pupils are equal, round, and reactive to light.   Neck:      Thyroid: No thyromegaly.      Vascular: No JVD.      Trachea: No tracheal deviation.   Cardiovascular:      Rate and Rhythm: Normal rate and regular rhythm.      Heart sounds: Normal heart sounds. No murmur heard.    No friction rub. No gallop.   Pulmonary:      Effort: Pulmonary effort is normal. No respiratory distress.      Breath sounds: Normal breath sounds. No stridor. No wheezing or rales.   Chest:      Chest wall: No tenderness.   Abdominal:      General: Bowel sounds are normal. There is no distension.      Palpations: Abdomen is soft. There is no mass.      Tenderness: There is no abdominal tenderness. There is no guarding or rebound.   Musculoskeletal:         General: No tenderness. Normal range of motion.      Cervical back: Normal range of motion and neck supple.   Lymphadenopathy:      Cervical: No cervical adenopathy.   Skin:     General: Skin is warm and dry.      Coloration: Skin is not pale.      Findings: No erythema or rash.   Neurological:      Mental Status: She is alert and oriented to person, place, and time.      Cranial Nerves: No cranial nerve deficit.      Motor: No abnormal muscle tone.      Coordination: Coordination normal.      Deep Tendon Reflexes: Reflexes are normal and symmetric. Reflexes normal.   Psychiatric:         Behavior: Behavior normal.         Thought Content: Thought content normal.         Judgment: Judgment  normal.                           Assessment & Plan        1. Medicare annual wellness visit, subsequent  Completed.  No significant findings except patient needs completion of her COVID-vaccine series and better control of her lipids.    2. Influenza A H1N1 infection-new problem.  Patient tested positive on nasal swab point-of-care testing for influenza A.  Started on Tamiflu.     - oseltamivir (TAMIFLU) 75 MG Cap; Take 1 Capsule by mouth 2 times a day.  Dispense: 10 Capsule; Refill: 0    3. Acute bronchitis due to other specified organisms- influenza A     As above  - POCT SARS-COV Antigen JEANNE manual result  - POCT Influenza A/B  - guaifenesin-codeine (ROBITUSSIN AC) Solution oral solution; Take 5 mL by mouth every four hours as needed for Cough for up to 10 days.  Dispense: 473 mL; Refill: 0  - oseltamivir (TAMIFLU) 75 MG Cap; Take 1 Capsule by mouth 2 times a day.  Dispense: 10 Capsule; Refill: 0    4. Dyslipidemia  Not at goal.  Patient declines statin therapy and wants to try diet and exercise first.  We will recheck again in 3 to 4 months and if still at this level will start on statin.  Her 10-year [place] risk score was over 14% which puts her at 50% higher risk than average for significant cardiovascular event..  - TSH; Future  - Comp Metabolic Panel; Future  - Lipid Profile; Future  - CBC WITH DIFFERENTIAL; Future    5. IFG (impaired fasting glucose)  Mediterranean diet and exercise.  - HEMOGLOBIN A1C; Future    6. Vitamin D deficiency  Continue 1000 units of vitamin D3 daily.  Under good control currently.  - VITAMIN D,25 HYDROXY (DEFICIENCY); Future    7. Hypothyroidism, acquired, autoimmune       Controlled continue current regimen with [place] Thyroid 75 mg daily.  - TSH; Future

## 2022-12-05 ENCOUNTER — TELEPHONE (OUTPATIENT)
Dept: MEDICAL GROUP | Age: 72
End: 2022-12-05
Payer: MEDICARE

## 2022-12-05 ENCOUNTER — APPOINTMENT (OUTPATIENT)
Dept: SPEECH THERAPY | Facility: REHABILITATION | Age: 72
End: 2022-12-05
Attending: PHYSICIAN ASSISTANT
Payer: MEDICARE

## 2022-12-05 NOTE — TELEPHONE ENCOUNTER
Patient called in and states that the robitussin and tamiflu are out of stock , she wants to know if there is something you can send for her cough and flu please and thank you

## 2022-12-06 NOTE — TELEPHONE ENCOUNTER
Phone Number Called: [phone] (home)     Call outcome: Spoke to patient regarding message below.    Message: Patient informed of Dr. Meza's message. She states that she is able to pick her cough medicine up today.

## 2022-12-06 NOTE — TELEPHONE ENCOUNTER
Keon Meza M.D.  25 [address]  [place] 16 hours ago (6:15 PM)    No.  No substitute.  She may have to try calling around to several more pharmacies until she can find one that has it in stock.  Chicken soup might work well.  Vitamin C.  Bedrest.  Eating get Robitussin-DM over-the-counter.

## 2022-12-29 ENCOUNTER — SPEECH THERAPY (OUTPATIENT)
Dept: SPEECH THERAPY | Facility: REHABILITATION | Age: 72
End: 2022-12-29
Attending: PHYSICIAN ASSISTANT
Payer: MEDICARE

## 2022-12-29 DIAGNOSIS — K30 FUNCTIONAL DYSPEPSIA: ICD-10-CM

## 2022-12-29 DIAGNOSIS — K21.9 GASTRO-ESOPHAGEAL REFLUX DISEASE WITHOUT ESOPHAGITIS: ICD-10-CM

## 2022-12-29 DIAGNOSIS — R13.12 DYSPHAGIA, OROPHARYNGEAL PHASE: ICD-10-CM

## 2022-12-29 DIAGNOSIS — K59.00 CONSTIPATION, UNSPECIFIED CONSTIPATION TYPE: ICD-10-CM

## 2022-12-29 DIAGNOSIS — R68.89 OTHER GENERAL SYMPTOMS AND SIGNS: ICD-10-CM

## 2022-12-29 PROCEDURE — 999999 HB NO CHARGE

## 2022-12-29 NOTE — OP THERAPY DAILY TREATMENT
Patient arrived for speech evaluation, but was unable to identify specific complaints re: swallowing or other functions.  Patient reported she attended appointment based on Doctor recommendations.  Patient reported she has a GI appointment coming up, and in the meantime she will monitor her swallowing, and if any difficulties arise, she will obtain a new referral for speech therapy.      Evaluation was not completed.  No charge for visit.

## 2023-02-13 ENCOUNTER — APPOINTMENT (RX ONLY)
Dept: URBAN - METROPOLITAN AREA CLINIC 35 | Facility: CLINIC | Age: 73
Setting detail: DERMATOLOGY
End: 2023-02-13

## 2023-02-13 DIAGNOSIS — Z71.89 OTHER SPECIFIED COUNSELING: ICD-10-CM

## 2023-02-13 DIAGNOSIS — D22 MELANOCYTIC NEVI: ICD-10-CM

## 2023-02-13 DIAGNOSIS — L81.4 OTHER MELANIN HYPERPIGMENTATION: ICD-10-CM

## 2023-02-13 DIAGNOSIS — L64.8 OTHER ANDROGENIC ALOPECIA: ICD-10-CM | Status: INADEQUATELY CONTROLLED

## 2023-02-13 DIAGNOSIS — D18.0 HEMANGIOMA: ICD-10-CM

## 2023-02-13 PROBLEM — D18.01 HEMANGIOMA OF SKIN AND SUBCUTANEOUS TISSUE: Status: ACTIVE | Noted: 2023-02-13

## 2023-02-13 PROBLEM — D22.4 MELANOCYTIC NEVI OF SCALP AND NECK: Status: ACTIVE | Noted: 2023-02-13

## 2023-02-13 PROBLEM — D22.5 MELANOCYTIC NEVI OF TRUNK: Status: ACTIVE | Noted: 2023-02-13

## 2023-02-13 PROCEDURE — 99213 OFFICE O/P EST LOW 20 MIN: CPT

## 2023-02-13 PROCEDURE — ? COUNSELING

## 2023-02-13 PROCEDURE — ? OBSERVATION AND MEASURE

## 2023-02-13 PROCEDURE — ? SUNSCREEN RECOMMENDATIONS

## 2023-02-13 ASSESSMENT — LOCATION SIMPLE DESCRIPTION DERM
LOCATION SIMPLE: RIGHT UPPER BACK
LOCATION SIMPLE: ABDOMEN
LOCATION SIMPLE: POSTERIOR SCALP
LOCATION SIMPLE: CHEST
LOCATION SIMPLE: LEFT POSTERIOR UPPER ARM
LOCATION SIMPLE: LEFT SCALP

## 2023-02-13 ASSESSMENT — LOCATION DETAILED DESCRIPTION DERM
LOCATION DETAILED: RIGHT MEDIAL SUPERIOR CHEST
LOCATION DETAILED: POSTERIOR MID-PARIETAL SCALP
LOCATION DETAILED: LEFT PROXIMAL POSTERIOR UPPER ARM
LOCATION DETAILED: RIGHT SUPERIOR MEDIAL UPPER BACK
LOCATION DETAILED: LEFT MEDIAL FRONTAL SCALP
LOCATION DETAILED: LEFT RIB CAGE

## 2023-02-13 ASSESSMENT — LOCATION ZONE DERM
LOCATION ZONE: ARM
LOCATION ZONE: TRUNK
LOCATION ZONE: SCALP

## 2023-03-27 ENCOUNTER — HOSPITAL ENCOUNTER (OUTPATIENT)
Dept: LAB | Facility: MEDICAL CENTER | Age: 73
End: 2023-03-27
Attending: INTERNAL MEDICINE
Payer: MEDICARE

## 2023-03-27 DIAGNOSIS — E06.3 HYPOTHYROIDISM, ACQUIRED, AUTOIMMUNE: ICD-10-CM

## 2023-03-27 DIAGNOSIS — R73.01 IFG (IMPAIRED FASTING GLUCOSE): ICD-10-CM

## 2023-03-27 DIAGNOSIS — E78.5 DYSLIPIDEMIA: ICD-10-CM

## 2023-03-27 DIAGNOSIS — E55.9 VITAMIN D DEFICIENCY: ICD-10-CM

## 2023-03-27 LAB
25(OH)D3 SERPL-MCNC: 68 NG/ML (ref 30–100)
ALBUMIN SERPL BCP-MCNC: 4.2 G/DL (ref 3.2–4.9)
ALBUMIN/GLOB SERPL: 1.4 G/DL
ALP SERPL-CCNC: 80 U/L (ref 30–99)
ALT SERPL-CCNC: 17 U/L (ref 2–50)
ANION GAP SERPL CALC-SCNC: 7 MMOL/L (ref 7–16)
AST SERPL-CCNC: 24 U/L (ref 12–45)
BASOPHILS # BLD AUTO: 0.6 % (ref 0–1.8)
BASOPHILS # BLD: 0.03 K/UL (ref 0–0.12)
BILIRUB SERPL-MCNC: 0.4 MG/DL (ref 0.1–1.5)
BUN SERPL-MCNC: 12 MG/DL (ref 8–22)
CALCIUM ALBUM COR SERPL-MCNC: 9.1 MG/DL (ref 8.5–10.5)
CALCIUM SERPL-MCNC: 9.3 MG/DL (ref 8.4–10.2)
CHLORIDE SERPL-SCNC: 102 MMOL/L (ref 96–112)
CHOLEST SERPL-MCNC: 190 MG/DL (ref 100–199)
CO2 SERPL-SCNC: 29 MMOL/L (ref 20–33)
CREAT SERPL-MCNC: 0.5 MG/DL (ref 0.5–1.4)
EOSINOPHIL # BLD AUTO: 0.14 K/UL (ref 0–0.51)
EOSINOPHIL NFR BLD: 2.9 % (ref 0–6.9)
ERYTHROCYTE [DISTWIDTH] IN BLOOD BY AUTOMATED COUNT: 45.2 FL (ref 35.9–50)
EST. AVERAGE GLUCOSE BLD GHB EST-MCNC: 111 MG/DL
FASTING STATUS PATIENT QL REPORTED: NORMAL
GFR SERPLBLD CREATININE-BSD FMLA CKD-EPI: 98 ML/MIN/1.73 M 2
GLOBULIN SER CALC-MCNC: 2.9 G/DL (ref 1.9–3.5)
GLUCOSE SERPL-MCNC: 94 MG/DL (ref 65–99)
HBA1C MFR BLD: 5.5 % (ref 4–5.6)
HCT VFR BLD AUTO: 44.4 % (ref 37–47)
HDLC SERPL-MCNC: 81 MG/DL
HGB BLD-MCNC: 14.7 G/DL (ref 12–16)
IMM GRANULOCYTES # BLD AUTO: 0.03 K/UL (ref 0–0.11)
IMM GRANULOCYTES NFR BLD AUTO: 0.6 % (ref 0–0.9)
LDLC SERPL CALC-MCNC: 94 MG/DL
LYMPHOCYTES # BLD AUTO: 1.57 K/UL (ref 1–4.8)
LYMPHOCYTES NFR BLD: 32 % (ref 22–41)
MCH RBC QN AUTO: 32.4 PG (ref 27–33)
MCHC RBC AUTO-ENTMCNC: 33.1 G/DL (ref 33.6–35)
MCV RBC AUTO: 97.8 FL (ref 81.4–97.8)
MONOCYTES # BLD AUTO: 0.52 K/UL (ref 0–0.85)
MONOCYTES NFR BLD AUTO: 10.6 % (ref 0–13.4)
NEUTROPHILS # BLD AUTO: 2.62 K/UL (ref 2–7.15)
NEUTROPHILS NFR BLD: 53.3 % (ref 44–72)
NRBC # BLD AUTO: 0 K/UL
NRBC BLD-RTO: 0 /100 WBC
PLATELET # BLD AUTO: 209 K/UL (ref 164–446)
PMV BLD AUTO: 11.7 FL (ref 9–12.9)
POTASSIUM SERPL-SCNC: 4.3 MMOL/L (ref 3.6–5.5)
PROT SERPL-MCNC: 7.1 G/DL (ref 6–8.2)
RBC # BLD AUTO: 4.54 M/UL (ref 4.2–5.4)
SODIUM SERPL-SCNC: 138 MMOL/L (ref 135–145)
TRIGL SERPL-MCNC: 77 MG/DL (ref 0–149)
TSH SERPL DL<=0.005 MIU/L-ACNC: 1.75 UIU/ML (ref 0.38–5.33)
WBC # BLD AUTO: 4.9 K/UL (ref 4.8–10.8)

## 2023-03-27 PROCEDURE — 83036 HEMOGLOBIN GLYCOSYLATED A1C: CPT

## 2023-03-27 PROCEDURE — 80061 LIPID PANEL: CPT

## 2023-03-27 PROCEDURE — 82306 VITAMIN D 25 HYDROXY: CPT

## 2023-03-27 PROCEDURE — 85025 COMPLETE CBC W/AUTO DIFF WBC: CPT

## 2023-03-27 PROCEDURE — 36415 COLL VENOUS BLD VENIPUNCTURE: CPT

## 2023-03-27 PROCEDURE — 84443 ASSAY THYROID STIM HORMONE: CPT

## 2023-03-27 PROCEDURE — 80053 COMPREHEN METABOLIC PANEL: CPT

## 2023-04-05 ENCOUNTER — OFFICE VISIT (OUTPATIENT)
Dept: MEDICAL GROUP | Age: 73
End: 2023-04-05
Payer: MEDICARE

## 2023-04-05 VITALS
DIASTOLIC BLOOD PRESSURE: 60 MMHG | OXYGEN SATURATION: 96 % | HEART RATE: 77 BPM | BODY MASS INDEX: 24.74 KG/M2 | TEMPERATURE: 97.6 F | SYSTOLIC BLOOD PRESSURE: 118 MMHG | HEIGHT: 60 IN | WEIGHT: 126 LBS

## 2023-04-05 DIAGNOSIS — R73.01 IFG (IMPAIRED FASTING GLUCOSE): ICD-10-CM

## 2023-04-05 DIAGNOSIS — E06.3 HYPOTHYROIDISM, ACQUIRED, AUTOIMMUNE: ICD-10-CM

## 2023-04-05 DIAGNOSIS — G89.29 CHRONIC LOW BACK PAIN WITH SCIATICA, SCIATICA LATERALITY UNSPECIFIED, UNSPECIFIED BACK PAIN LATERALITY: ICD-10-CM

## 2023-04-05 DIAGNOSIS — E78.5 DYSLIPIDEMIA: ICD-10-CM

## 2023-04-05 DIAGNOSIS — J30.9 ALLERGIC RHINITIS, UNSPECIFIED SEASONALITY, UNSPECIFIED TRIGGER: ICD-10-CM

## 2023-04-05 DIAGNOSIS — M54.40 CHRONIC LOW BACK PAIN WITH SCIATICA, SCIATICA LATERALITY UNSPECIFIED, UNSPECIFIED BACK PAIN LATERALITY: ICD-10-CM

## 2023-04-05 DIAGNOSIS — J45.40 MODERATE PERSISTENT ASTHMA WITHOUT COMPLICATION: ICD-10-CM

## 2023-04-05 PROCEDURE — 99214 OFFICE O/P EST MOD 30 MIN: CPT | Performed by: INTERNAL MEDICINE

## 2023-04-05 RX ORDER — ALBUTEROL SULFATE 90 UG/1
2 AEROSOL, METERED RESPIRATORY (INHALATION) EVERY 4 HOURS PRN
Qty: 1 EACH | Refills: 5 | Status: SHIPPED | OUTPATIENT
Start: 2023-04-05 | End: 2023-12-06 | Stop reason: SDUPTHER

## 2023-04-05 RX ORDER — AZELASTINE 1 MG/ML
2 SPRAY, METERED NASAL 2 TIMES DAILY
Qty: 30 ML | Refills: 0 | Status: SHIPPED | OUTPATIENT
Start: 2023-04-05 | End: 2023-12-06 | Stop reason: SDUPTHER

## 2023-04-05 RX ORDER — THYROID 60 MG/1
60 TABLET ORAL DAILY
Qty: 90 TABLET | Refills: 4 | Status: SHIPPED | OUTPATIENT
Start: 2023-04-05 | End: 2023-12-06 | Stop reason: SDUPTHER

## 2023-04-05 RX ORDER — FLUTICASONE PROPIONATE AND SALMETEROL 250; 50 UG/1; UG/1
1 POWDER RESPIRATORY (INHALATION) 2 TIMES DAILY
Qty: 1 EACH | Refills: 3 | Status: SHIPPED | OUTPATIENT
Start: 2023-04-05 | End: 2023-12-06 | Stop reason: SDUPTHER

## 2023-04-05 RX ORDER — THYROID 15 MG/1
15 TABLET ORAL DAILY
Qty: 90 TABLET | Refills: 4 | Status: SHIPPED | OUTPATIENT
Start: 2023-04-05 | End: 2023-12-06 | Stop reason: SDUPTHER

## 2023-04-05 ASSESSMENT — FIBROSIS 4 INDEX: FIB4 SCORE: 2.03

## 2023-04-05 ASSESSMENT — ENCOUNTER SYMPTOMS
MUSCULOSKELETAL NEGATIVE: 1
PSYCHIATRIC NEGATIVE: 1
CONSTITUTIONAL NEGATIVE: 1
NEUROLOGICAL NEGATIVE: 1
GASTROINTESTINAL NEGATIVE: 1
RESPIRATORY NEGATIVE: 1
CARDIOVASCULAR NEGATIVE: 1
EYES NEGATIVE: 1

## 2023-04-05 ASSESSMENT — PATIENT HEALTH QUESTIONNAIRE - PHQ9: CLINICAL INTERPRETATION OF PHQ2 SCORE: 0

## 2023-04-05 NOTE — PROGRESS NOTES
Subjective     Yaz Farooq is a 72 y.o. female who presents with Follow-Up (Lab review )  The patient is here for followup of chronic medical problems listed below. The patient is compliant with medications and having no side effects from them. Denies chest pain, abdominal pain, dyspnea, myalgias, or cough.   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 10 Capsule 0    thyroid (ARMOUR THYROID) 60 MG Tab Take 1 Tablet by mouth every day. 90 Tablet 4    thyroid (ARMOUR THYROID) 15 MG Tab Take 1 Tablet by mouth every day. To take in addition to 60 mg [place] Thyroid pill daily 90 Tablet 4    fluticasone-salmeterol (ADVAIR) 250-50 MCG/ACT AEROSOL POWDER, BREATH ACTIVATED Inhale 1 Puff 2 times a day. 1 Each 3    albuterol 108 (90 Base) MCG/ACT Aero Soln inhalation aerosol Inhale 2 Puffs every four hours as needed for Shortness of Breath. 1 Each 5    azelastine (ASTELIN) 137 MCG/SPRAY nasal spray Administer 2 Sprays into affected nostril(S) 2 times a day. 30 mL 0     No facility-administered  medications prior to visit.     ',a  Allergies   Allergen Reactions    Augmentin      Pain in the stomach  Other reaction(s): Abdominal Pain    Compazine Shortness of Breath and Swelling     Other reaction(s): Respiratory Distress    Amoxicillin-Clavulanic Acid [Amoxicillin-Pot Clavulanate]      Pain in stomach     Hospital Outpatient Visit on 03/27/2023   Component Date Value    TSH 03/27/2023 1.750     Sodium 03/27/2023 138     Potassium 03/27/2023 4.3     Chloride 03/27/2023 102     Co2 03/27/2023 29     Anion Gap 03/27/2023 7.0     Glucose 03/27/2023 94     Bun 03/27/2023 12     Creatinine 03/27/2023 0.50     Calcium 03/27/2023 9.3     AST(SGOT) 03/27/2023 24     ALT(SGPT) 03/27/2023 17     Alkaline Phosphatase 03/27/2023 80     Total Bilirubin 03/27/2023 0.4     Albumin 03/27/2023 4.2     Total Protein 03/27/2023 7.1     Globulin 03/27/2023 2.9     A-G Ratio 03/27/2023 1.4     Cholesterol,Tot 03/27/2023 190     Triglycerides 03/27/2023 77     HDL 03/27/2023 81     LDL 03/27/2023 94     WBC 03/27/2023 4.9     RBC 03/27/2023 4.54     Hemoglobin 03/27/2023 14.7     Hematocrit 03/27/2023 44.4     MCV 03/27/2023 97.8     MCH 03/27/2023 32.4     MCHC 03/27/2023 33.1 (L)     RDW 03/27/2023 45.2     Platelet Count 03/27/2023 209     MPV 03/27/2023 11.7     Neutrophils-Polys 03/27/2023 53.30     Lymphocytes 03/27/2023 32.00     Monocytes 03/27/2023 10.60     Eosinophils 03/27/2023 2.90     Basophils 03/27/2023 0.60     Immature Granulocytes 03/27/2023 0.60     Nucleated RBC 03/27/2023 0.00     Neutrophils (Absolute) 03/27/2023 2.62     Lymphs (Absolute) 03/27/2023 1.57     Monos (Absolute) 03/27/2023 0.52     Eos (Absolute) 03/27/2023 0.14     Baso (Absolute) 03/27/2023 0.03     Immature Granulocytes (a* 03/27/2023 0.03     NRBC (Absolute) 03/27/2023 0.00     25-Hydroxy   Vitamin D 25 03/27/2023 68     Glycohemoglobin 03/27/2023 5.5     Est Avg Glucose 03/27/2023 111     Fasting Status 03/27/2023 Fasting     Correct  Calcium 03/27/2023 9.1     GFR (CKD-EPI) 03/27/2023 98       Lab Results   Component Value Date/Time    HBA1C 5.5 03/27/2023 08:32 AM    HBA1C 5.4 11/21/2022 08:31 AM     Lab Results   Component Value Date/Time    SODIUM 138 03/27/2023 08:32 AM    POTASSIUM 4.3 03/27/2023 08:32 AM    CHLORIDE 102 03/27/2023 08:32 AM    CO2 29 03/27/2023 08:32 AM    GLUCOSE 94 03/27/2023 08:32 AM    BUN 12 03/27/2023 08:32 AM    CREATININE 0.50 03/27/2023 08:32 AM    ALKPHOSPHAT 80 03/27/2023 08:32 AM    ASTSGOT 24 03/27/2023 08:32 AM    ALTSGPT 17 03/27/2023 08:32 AM    TBILIRUBIN 0.4 03/27/2023 08:32 AM     No results found for: INR  Lab Results   Component Value Date/Time    CHOLSTRLTOT 190 03/27/2023 08:32 AM    LDL 94 03/27/2023 08:32 AM    HDL 81 03/27/2023 08:32 AM    TRIGLYCERIDE 77 03/27/2023 08:32 AM       No results found for: TESTOSTERONE  No results found for: TSH  Lab Results   Component Value Date/Time    FREET4 0.87 (L) 11/21/2022 08:31 AM    FREET4 0.88 (L) 07/29/2022 08:11 AM     No results found for: URICACID  No components found for: VITB12  Lab Results   Component Value Date/Time    25HYDROXY 68 03/27/2023 08:32 AM    25HYDROXY 72 11/21/2022 08:31 AM                HPI    Review of Systems   Constitutional: Negative.    HENT: Negative.     Eyes: Negative.    Respiratory: Negative.     Cardiovascular: Negative.    Gastrointestinal: Negative.    Genitourinary: Negative.    Musculoskeletal: Negative.    Skin: Negative.    Neurological: Negative.    Endo/Heme/Allergies: Negative.    Psychiatric/Behavioral: Negative.              Objective     /60 (BP Location: Left arm, Patient Position: Sitting, BP Cuff Size: Adult)   Pulse 77   Temp 36.4 °C (97.6 °F) (Temporal)   Ht 1.524 m (5')   Wt 57.2 kg (126 lb)   SpO2 96%   BMI 24.61 kg/m²      Physical Exam  Vitals reviewed.   Constitutional:       General: She is not in acute distress.     Appearance: She is well-developed. She is not diaphoretic.   HENT:       Head: Normocephalic and atraumatic.      Right Ear: External ear normal.      Left Ear: External ear normal.      Nose: Nose normal.      Mouth/Throat:      Pharynx: No oropharyngeal exudate.   Eyes:      General: No scleral icterus.        Right eye: No discharge.         Left eye: No discharge.      Conjunctiva/sclera: Conjunctivae normal.      Pupils: Pupils are equal, round, and reactive to light.   Neck:      Thyroid: No thyromegaly.      Vascular: No JVD.      Trachea: No tracheal deviation.   Cardiovascular:      Rate and Rhythm: Normal rate and regular rhythm.      Heart sounds: Normal heart sounds. No murmur heard.    No friction rub. No gallop.   Pulmonary:      Effort: Pulmonary effort is normal. No respiratory distress.      Breath sounds: Normal breath sounds. No stridor. No wheezing or rales.   Chest:      Chest wall: No tenderness.   Abdominal:      General: Bowel sounds are normal. There is no distension.      Palpations: Abdomen is soft. There is no mass.      Tenderness: There is no abdominal tenderness. There is no guarding or rebound.   Musculoskeletal:         General: No tenderness. Normal range of motion.      Cervical back: Normal range of motion and neck supple.   Lymphadenopathy:      Cervical: No cervical adenopathy.   Skin:     General: Skin is warm and dry.      Coloration: Skin is not pale.      Findings: No erythema or rash.   Neurological:      Mental Status: She is alert and oriented to person, place, and time.      Cranial Nerves: No cranial nerve deficit.      Motor: No abnormal muscle tone.      Coordination: Coordination normal.      Deep Tendon Reflexes: Reflexes are normal and symmetric. Reflexes normal.   Psychiatric:         Behavior: Behavior normal.         Thought Content: Thought content normal.         Judgment: Judgment normal.                           Assessment & Plan        1. Hypothyroidism, acquired, autoimmune    Under good control. Continue same regimen.  -  thyroid (ARMOUR THYROID) 60 MG Tab; Take 1 Tablet by mouth every day.  Dispense: 90 Tablet; Refill: 4  - thyroid (ARMOUR THYROID) 15 MG Tab; Take 1 Tablet by mouth every day. To take in addition to 60 mg [place] Thyroid pill daily  Dispense: 90 Tablet; Refill: 4    2. Moderate persistent asthma without complication     Under good control. Continue same regimen.  - fluticasone-salmeterol (ADVAIR) 250-50 MCG/ACT AEROSOL POWDER, BREATH ACTIVATED; Inhale 1 Puff 2 times a day.  Dispense: 1 Each; Refill: 3  - albuterol 108 (90 Base) MCG/ACT Aero Soln inhalation aerosol; Inhale 2 Puffs every four hours as needed for Shortness of Breath.  Dispense: 1 Each; Refill: 5    3. Allergic rhinitis, unspecified seasonality, unspecified trigger       Under good control. Continue same regimen.  - azelastine (ASTELIN) 137 MCG/SPRAY nasal spray; Administer 2 Sprays into affected nostril(S) 2 times a day.  Dispense: 30 mL; Refill: 0    4. Dyslipidemia     Under good control. Continue same regimen. diet/exercise/lose 15 lbs.; patient counseled      5. IFG (impaired fasting glucose)    diet/exercise/lose 15 lbs.; patient counseled      6. Chronic low back pain with sciatica, sciatica laterality unspecified, unspecified back pain laterality  Not well controlled.  Refer to physical therapy for further evaluation and management.  - Referral to Physical Therapy

## 2023-11-08 ENCOUNTER — TELEPHONE (OUTPATIENT)
Dept: MEDICAL GROUP | Age: 73
End: 2023-11-08

## 2023-12-06 ENCOUNTER — OFFICE VISIT (OUTPATIENT)
Dept: MEDICAL GROUP | Age: 73
End: 2023-12-06
Payer: MEDICARE

## 2023-12-06 VITALS
HEIGHT: 60 IN | HEART RATE: 71 BPM | DIASTOLIC BLOOD PRESSURE: 60 MMHG | SYSTOLIC BLOOD PRESSURE: 102 MMHG | BODY MASS INDEX: 25.13 KG/M2 | TEMPERATURE: 98 F | WEIGHT: 128 LBS | OXYGEN SATURATION: 97 %

## 2023-12-06 DIAGNOSIS — Z12.83 SCREENING EXAM FOR SKIN CANCER: ICD-10-CM

## 2023-12-06 DIAGNOSIS — R73.01 IFG (IMPAIRED FASTING GLUCOSE): ICD-10-CM

## 2023-12-06 DIAGNOSIS — F51.01 PRIMARY INSOMNIA: ICD-10-CM

## 2023-12-06 DIAGNOSIS — K21.00 GASTROESOPHAGEAL REFLUX DISEASE WITH ESOPHAGITIS, UNSPECIFIED WHETHER HEMORRHAGE: ICD-10-CM

## 2023-12-06 DIAGNOSIS — E55.9 VITAMIN D DEFICIENCY: ICD-10-CM

## 2023-12-06 DIAGNOSIS — E06.3 HYPOTHYROIDISM, ACQUIRED, AUTOIMMUNE: ICD-10-CM

## 2023-12-06 DIAGNOSIS — H10.13 ALLERGIC CONJUNCTIVITIS OF BOTH EYES: ICD-10-CM

## 2023-12-06 DIAGNOSIS — J30.9 ALLERGIC RHINITIS, UNSPECIFIED SEASONALITY, UNSPECIFIED TRIGGER: ICD-10-CM

## 2023-12-06 DIAGNOSIS — Z00.00 MEDICARE ANNUAL WELLNESS VISIT, SUBSEQUENT: ICD-10-CM

## 2023-12-06 DIAGNOSIS — F41.9 ANXIETY: ICD-10-CM

## 2023-12-06 DIAGNOSIS — E78.5 DYSLIPIDEMIA: ICD-10-CM

## 2023-12-06 DIAGNOSIS — J45.40 MODERATE PERSISTENT ASTHMA WITHOUT COMPLICATION: ICD-10-CM

## 2023-12-06 PROCEDURE — G0439 PPPS, SUBSEQ VISIT: HCPCS | Performed by: INTERNAL MEDICINE

## 2023-12-06 PROCEDURE — 3078F DIAST BP <80 MM HG: CPT | Performed by: INTERNAL MEDICINE

## 2023-12-06 PROCEDURE — 3074F SYST BP LT 130 MM HG: CPT | Performed by: INTERNAL MEDICINE

## 2023-12-06 RX ORDER — THYROID 60 MG/1
60 TABLET ORAL DAILY
Qty: 90 TABLET | Refills: 4 | Status: SHIPPED | OUTPATIENT
Start: 2023-12-06

## 2023-12-06 RX ORDER — ALPRAZOLAM 0.5 MG/1
0.5 TABLET ORAL NIGHTLY PRN
Qty: 60 TABLET | Refills: 2 | Status: SHIPPED | OUTPATIENT
Start: 2023-12-06 | End: 2024-02-04

## 2023-12-06 RX ORDER — FLUTICASONE PROPIONATE AND SALMETEROL 250; 50 UG/1; UG/1
1 POWDER RESPIRATORY (INHALATION) 2 TIMES DAILY
Qty: 1 EACH | Refills: 3 | Status: SHIPPED | OUTPATIENT
Start: 2023-12-06

## 2023-12-06 RX ORDER — AZELASTINE 1 MG/ML
2 SPRAY, METERED NASAL 2 TIMES DAILY
Qty: 30 ML | Refills: 0 | Status: SHIPPED | OUTPATIENT
Start: 2023-12-06

## 2023-12-06 RX ORDER — FAMOTIDINE 40 MG/1
40 TABLET, FILM COATED ORAL DAILY
Qty: 90 TABLET | Refills: 3 | Status: SHIPPED | OUTPATIENT
Start: 2023-12-06

## 2023-12-06 RX ORDER — ALBUTEROL SULFATE 90 UG/1
2 AEROSOL, METERED RESPIRATORY (INHALATION) EVERY 4 HOURS PRN
Qty: 1 EACH | Refills: 5 | Status: SHIPPED | OUTPATIENT
Start: 2023-12-06

## 2023-12-06 RX ORDER — PANTOPRAZOLE SODIUM 40 MG/1
40 TABLET, DELAYED RELEASE ORAL DAILY
Qty: 90 TABLET | Refills: 3 | Status: SHIPPED | OUTPATIENT
Start: 2023-12-06

## 2023-12-06 RX ORDER — FAMOTIDINE 40 MG/1
40 TABLET, FILM COATED ORAL DAILY
COMMUNITY
End: 2023-12-06 | Stop reason: SDUPTHER

## 2023-12-06 RX ORDER — PANTOPRAZOLE SODIUM 40 MG/1
40 TABLET, DELAYED RELEASE ORAL DAILY
COMMUNITY
End: 2023-12-06 | Stop reason: SDUPTHER

## 2023-12-06 RX ORDER — NAPHAZOLINE HYDROCHLORIDE AND PHENIRAMINE MALEATE .25; 3 MG/ML; MG/ML
SOLUTION/ DROPS OPHTHALMIC
Qty: 10 ML | Refills: 0 | Status: SHIPPED | OUTPATIENT
Start: 2023-12-06

## 2023-12-06 RX ORDER — THYROID 15 MG/1
15 TABLET ORAL DAILY
Qty: 90 TABLET | Refills: 4 | Status: SHIPPED | OUTPATIENT
Start: 2023-12-06

## 2023-12-06 ASSESSMENT — ENCOUNTER SYMPTOMS: GENERAL WELL-BEING: GOOD

## 2023-12-06 ASSESSMENT — PATIENT HEALTH QUESTIONNAIRE - PHQ9: CLINICAL INTERPRETATION OF PHQ2 SCORE: 0

## 2023-12-06 ASSESSMENT — FIBROSIS 4 INDEX: FIB4 SCORE: 2.06

## 2023-12-06 ASSESSMENT — ACTIVITIES OF DAILY LIVING (ADL): BATHING_REQUIRES_ASSISTANCE: 0

## 2023-12-06 NOTE — PROGRESS NOTES
[8:08 AM] Ralph Arizmendi MA COMPONENT     HPI:    Yaz Farooq is a 73 y.o. here today for a Medicare Annual Wellness Visit.     And  The patient is here for followup of chronic medical problems listed below. The patient is compliant with medications and having no side effects from them. Denies chest pain, abdominal pain, dyspnea, myalgias, or cough.     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  No visits with results within 1 Month(s) from this visit.   Latest known visit with results is:   Hospital Outpatient Visit on 03/27/2023   Component Date Value    TSH 03/27/2023 1.750     Sodium 03/27/2023 138     Potassium 03/27/2023 4.3     Chloride 03/27/2023 102     Co2 03/27/2023 29     Anion Gap 03/27/2023 7.0     Glucose 03/27/2023 94     Bun 03/27/2023 12     Creatinine 03/27/2023 0.50     Calcium 03/27/2023 9.3     AST(SGOT) 03/27/2023 24     ALT(SGPT) 03/27/2023 17     Alkaline Phosphatase 03/27/2023 80     Total Bilirubin 03/27/2023 0.4     Albumin 03/27/2023 4.2     Total Protein  "03/27/2023 7.1     Globulin 03/27/2023 2.9     A-G Ratio 03/27/2023 1.4     Cholesterol,Tot 03/27/2023 190     Triglycerides 03/27/2023 77     HDL 03/27/2023 81     LDL 03/27/2023 94     WBC 03/27/2023 4.9     RBC 03/27/2023 4.54     Hemoglobin 03/27/2023 14.7     Hematocrit 03/27/2023 44.4     MCV 03/27/2023 97.8     MCH 03/27/2023 32.4     MCHC 03/27/2023 33.1 (L)     RDW 03/27/2023 45.2     Platelet Count 03/27/2023 209     MPV 03/27/2023 11.7     Neutrophils-Polys 03/27/2023 53.30     Lymphocytes 03/27/2023 32.00     Monocytes 03/27/2023 10.60     Eosinophils 03/27/2023 2.90     Basophils 03/27/2023 0.60     Immature Granulocytes 03/27/2023 0.60     Nucleated RBC 03/27/2023 0.00     Neutrophils (Absolute) 03/27/2023 2.62     Lymphs (Absolute) 03/27/2023 1.57     Monos (Absolute) 03/27/2023 0.52     Eos (Absolute) 03/27/2023 0.14     Baso (Absolute) 03/27/2023 0.03     Immature Granulocytes (a* 03/27/2023 0.03     NRBC (Absolute) 03/27/2023 0.00     25-Hydroxy   Vitamin D 25 03/27/2023 68     Glycohemoglobin 03/27/2023 5.5     Est Avg Glucose 03/27/2023 111     Fasting Status 03/27/2023 Fasting     Correct Calcium 03/27/2023 9.1     GFR (CKD-EPI) 03/27/2023 98       Lab Results   Component Value Date/Time    HBA1C 5.5 03/27/2023 08:32 AM    HBA1C 5.4 11/21/2022 08:31 AM     Lab Results   Component Value Date/Time    SODIUM 138 03/27/2023 08:32 AM    POTASSIUM 4.3 03/27/2023 08:32 AM    CHLORIDE 102 03/27/2023 08:32 AM    CO2 29 03/27/2023 08:32 AM    GLUCOSE 94 03/27/2023 08:32 AM    BUN 12 03/27/2023 08:32 AM    CREATININE 0.50 03/27/2023 08:32 AM    ALKPHOSPHAT 80 03/27/2023 08:32 AM    ASTSGOT 24 03/27/2023 08:32 AM    ALTSGPT 17 03/27/2023 08:32 AM    TBILIRUBIN 0.4 03/27/2023 08:32 AM     No results found for: \"INR\"  Lab Results   Component Value Date/Time    CHOLSTRLTOT 190 03/27/2023 08:32 AM    LDL 94 03/27/2023 08:32 AM    HDL 81 03/27/2023 08:32 AM    TRIGLYCERIDE 77 03/27/2023 08:32 AM       No results " "found for: \"TESTOSTERONE\"  No results found for: \"TSH\"  Lab Results   Component Value Date/Time    FREET4 0.87 (L) 11/21/2022 08:31 AM    FREET4 0.88 (L) 07/29/2022 08:11 AM     No results found for: \"URICACID\"  No components found for: \"VITB12\"  Lab Results   Component Value Date/Time    25HYDROXY 68 03/27/2023 08:32 AM    25HYDROXY 72 11/21/2022 08:31 AM       Current supplements as per medication list.     Allergies: Augmentin, Compazine, and Amoxicillin-clavulanic acid [amoxicillin-pot clavulanate]     Current social contact/activities: yes     She  reports that she has never smoked. She has never used smokeless tobacco. She reports current alcohol use of about 1.8 - 2.4 oz of alcohol per week. She reports that she does not use drugs.     Lives with at home: alone    Any caregivers: no    Is the caregiver paid or unpaid: no     ROS:      Gait: Uses no assistive device    Ostomy: No    Other tubes: No    Amputations: No    Chronic oxygen use: No    Last eye exam: 08/2023    Wears hearing aids: No     : Denies any urinary leakage during the last 6 months     Depression Screening  Little interest or pleasure in doing things?  0 - not at all  Feeling down, depressed , or hopeless? 0 - not at all  Patient Health Questionnaire Score: 0     If depressive symptoms identified deferred to follow up visit unless specifically addressed in assessment and plan.    Interpretation of PHQ-9 Total Score   Score Severity   1-4 No Depression   5-9 Mild Depression   10-14 Moderate Depression   15-19 Moderately Severe Depression   20-27 Severe Depression    Screening for Cognitive Impairment  Do you or any of your friends or family members have any concern about your memory? No  Three Minute Recall (Banana, Sunrise, Chair) 3/3    Dario clock face with all 12 numbers and set the hands to show 20 past 8.  Yes    Cognitive concerns identified deferred for follow up unless specifically addressed in assessment and plan.    Fall Risk " Assessment  Has the patient had two or more falls in the last year or any fall with injury in the last year?  No    Safety Assessment  Do you always wear your seatbelt?  Yes  Any changes to home needed to function safely? No  Difficulty hearing.  No  Patient counseled about all safety risks that were identified.    Functional Assessment ADLs  Are there any barriers preventing you from cooking for yourself or meeting nutritional needs?  No.    Are there any barriers preventing you from driving safely or obtaining transportation?  No.    Are there any barriers preventing you from using a telephone or calling for help?  No    Are there any barriers preventing you from shopping?  No.    Are there any barriers preventing you from taking care of your own finances?  No    Are there any barriers preventing you from managing your medications?  No    Are there any barriers preventing you from showering, bathing or dressing yourself? No    Are there any barriers preventing you from doing housework or laundry? No  Are there any barriers preventing you from using the toilet?No  Are you currently engaging in any exercise or physical activity?  Yes.      Self-Assessment of Health  What is your perception of your health? Good  Do you sleep more than six hours a night? No  In the past 7 days, how much did pain keep you from doing your normal work? None  Do you spend quality time with family or friends (virtually or in person)? Yes  Do you usually eat a heart healthy diet that constists of a variety of fruits, vegetables, whole grains and fiber? Yes  Do you eat foods high in fat and/or Fast Food more than three times per week? No    Advance Care Planning  Do you have an Advance Directive, Living Will, Durable Power of , or POLST? Yes    Living Will            Health Maintenance Summary            Overdue - COVID-19 Vaccine (3 - 2023-24 season) Overdue since 9/1/2023 07/28/2021  Imm Admin: PFIZER PURPLE CAP SARS-COV-2  VACCINATION (12+)    07/07/2021  Imm Admin: PFIZER PURPLE CAP SARS-COV-2 VACCINATION (12+)              Overdue - Annual Wellness Visit (Every 366 Days) Overdue since 12/2/2023 12/01/2022  Visit Dx: Medicare annual wellness visit, subsequent    12/01/2022  Level of Service: [place] ANNU[place] WELL[place]-INCLUDES PPPS SUBSEQUE*    11/29/2021  [place] of Service: [place]IT[place] AN[place] WELLNESS VISIT-INCLUDES PPPS    11/29/2021  Visit Dx: Medicare annual wellness visit, subsequent              Mammogram (Every 2 Years) Tentatively due on 4/7/2024 04/07/2022  MA-SCREENING MAMMO BILAT W/TOMOSYNTHESIS W/CAD    03/10/2021  MA-SCREENING MAMMO BILAT W/TOMOSYNTHESIS W/CAD    03/05/2020  MA-SCREENING MAMMO BILAT W/TOMOSYNTHESIS W/CAD    03/04/2019  MA-SCREENING MAMMO BILAT W/TOMOSYNTHESIS W/CAD    03/01/2018  MA-MAMMO SCREENING BILAT W/STEPHY W/CAD    Only the first 5 history entries have been loaded, but more history exists.              Bone Density Scan (Every 5 Years) Next due on 7/22/2027 07/22/2022  DS-BONE DENSITY STUDY (DEXA)    11/07/2011  DS-BONE DENSITY STUDY (DEXA)              Colorectal Cancer Screening (Colonoscopy - Every 10 Years) Next due on 10/9/2027      10/09/2017  Colonoscopy (Done)              IMM DTaP/Tdap/Td Vaccine (2 - Td or Tdap) Next due on 6/13/2029 06/13/2019  Imm Admin: Tdap Vaccine              Pneumococcal Vaccine: 65+ Years (Series Information) Completed      10/02/2017  Imm Admin: Pneumococcal polysaccharide vaccine (PPSV-23)    09/28/2016  Imm Admin: Pneumococcal Conjugate Vaccine (Prevnar/PCV-13)              Hepatitis C Screening  Tentatively Complete      10/21/2020  Hepatitis C Antibody component of HEP C VIRUS ANTIBODY              Influenza Vaccine (Series Information) Completed      10/04/2023  Outside Immunization: Influenza Quad Adjuvanted    11/01/2022  Imm Admin: Influenza, Unspecified - HISTORICAL DATA    10/15/2021  Imm Admin: Influenza, Unspecified - HISTORICAL DATA     10/05/2020  Imm Admin: Influenza, Unspecified - HISTORICAL DATA    10/04/2018  Imm Admin: Influenza, Unspecified - HISTORICAL DATA    Only the first 5 history entries have been loaded, but more history exists.              Hepatitis A Vaccine (Hep A) (Series Information) Aged Out      No completion history exists for this topic.              Hepatitis B Vaccine (Hep B) (Series Information) Aged Out      No completion history exists for this topic.              HPV Vaccines (Series Information) Aged Out      No completion history exists for this topic.              Polio Vaccine (Inactivated Polio) (Series Information) Aged Out      No completion history exists for this topic.              Meningococcal Immunization (Series Information) Aged Out      No completion history exists for this topic.              Discontinued - Zoster (Shingles) Vaccines  Discontinued      10/04/2019  Imm Admin: Zoster Vaccine Recombinant (RZV) (SHINGRIX)    02/10/2015  Imm Admin: Zoster Vaccine Live (ZVL) (Zostavax) - HISTORICAL DATA                    Patient Care Team:  Keon Meza M.D. as PCP - General (Internal Medicine)  Claude K Lardinois, M.D. as Consulting Physician (Endocrinology)  Jose Abrams M.D. as Attending Team Physician (Internal Medicine)  Keon Vilchis M.D. (Ophthalmology)  Zari Cunningham MS,CCC-SLP (Inactive) as Speech Therapist (Speech Pathology)         Patient Care Team:  Keon Meza M.D. as PCP - General (Internal Medicine)  Claude K Lardinois, M.D. as Consulting Physician (Endocrinology)  Jose Abrams M.D. as Attending Team Physician (Internal Medicine)  Keon Vilchis M.D. (Ophthalmology)  Zari Cunningham MS,CCC-SLP (Inactive) as Speech Therapist (Speech Pathology)       Social History     Tobacco Use    Smoking status: Never    Smokeless tobacco: Never   Vaping Use    Vaping Use: Never used   Substance Use Topics    Alcohol use: Yes     Alcohol/week: 1.8 - 2.4 oz     Types: 3 - 4  Glasses of wine per week     Comment: socc    Drug use: No          Family History   Problem Relation Age of Onset    Heart Disease Mother         MI    Heart Disease Father 80        MI    Cancer Sister         breast cancer    Breast Cancer Sister           Past Surgical History:   Procedure Laterality Date    OTHER ORTHOPEDIC SURGERY          Whisper test - stand 3 feet  behind patient and whisper 3 numbers for each ear while covering other ear..     - Right 3/3, Left 3/3     Vision test - using Snellen eye chart test both eyes separately and together, with and without correction.      - Without correction:  OD  20/40, OS 20/40, OU 20/40    - With correction:  OD  20/40, OS 20/40, OU 20/40     Rise and walk test - Have patient stand, walk 10 feet and return to chair.    -  9 seconds     Stay independent checklist:     Yes (2) No (0) I have fallen in the past year. 0    Yes (2) No (0) I use or have been advised to use a cane or     walker to get around safely.0    Yes (1) No (0) Sometimes I feel unsteady when I am walking. 0    Yes (1) No (0) I steady myself by holding onto furniture     when walking at home.0    Yes (1) No (0) I am worried about falling0    Yes (1) No (0) I need to push with my hands to stand up     from a chair.0    Yes (1) No (0) I have some trouble stepping up onto a curb. 0    Yes (1) No (0) I often have to rush to the toilet. 0    Yes (1) No (0) I have lost some feeling in my feet. 0    Yes (1) No (0) I take medicine that sometimes makes me feel     light-headed or more tired than usual.0    Yes (1) No (0) I take medicine to help me sleep or improve     my mood. 0    Yes (1) No (0) I often feel sad or depressed. 0    Total Add up the number of points for each “yes” answer. 0     EXAM    /60 (BP Location: Left arm, Patient Position: Sitting, BP Cuff Size: Adult)   Pulse 71   Temp 36.7 °C (98 °F) (Temporal)   Ht 1.524 m (5')   Wt 58.1 kg (128 lb)   SpO2 97%  -     HEENT clear.  Neck  and thyroid normal.  Lungs clear.  Heart regular rhythm no murmur gallop rub or JVD.  Abdomen benign without HSM or masses.  Bowel sounds are normal extremities without sinus clubbing or edema.  Neurological is normal without focal deficits and mental status exam normal with no cognitive deficits.  Hearing good.      Dentition good    Alert, oriented in no acute distress.    Eye contact is good, speech goal directed, affect calm              Health maintenance review: (catch them up on all care gaps - vaccines, mammogram, colon cancer screen)     Health Maintenance Summary            Overdue - COVID-19 Vaccine (3 - 2023-24 season) Overdue since 9/1/2023 07/28/2021  Imm Admin: PFIZER PURPLE CAP SARS-COV-2 VACCINATION (12+)    07/07/2021  Imm Admin: PFIZER PURPLE CAP SARS-COV-2 VACCINATION (12+)              Overdue - Annual Wellness Visit (Every 366 Days) Overdue since 12/2/2023 12/01/2022  Visit Dx: Medicare annual wellness visit, subsequent    12/01/2022  Level of Service: [place] ANNUAL WELL[place] VISIT-INCLUDES PPPS SUBSEQUE*    11/29/2021  Level of Service: [place] INIT[place] AN[place]AL WELLNESS VISIT-INCLUDES PPPS    11/29/2021  Visit Dx: Medicare annual wellness visit, subsequent              Mammogram (Every 2 Years) Tentatively due on 4/7/2024 04/07/2022  MA-SCREENING MAMMO BILAT W/TOMOSYNTHESIS W/CAD    03/10/2021  MA-SCREENING MAMMO BILAT W/TOMOSYNTHESIS W/CAD    03/05/2020  MA-SCREENING MAMMO BILAT W/TOMOSYNTHESIS W/CAD    03/04/2019  MA-SCREENING MAMMO BILAT W/TOMOSYNTHESIS W/CAD    03/01/2018  MA-MAMMO SCREENING BILAT W/STEPHY W/CAD    Only the first 5 history entries have been loaded, but more history exists.              Bone Density Scan (Every 5 Years) Next due on 7/22/2027 07/22/2022  DS-BONE DENSITY STUDY (DEXA)    11/07/2011  DS-BONE DENSITY STUDY (DEXA)              Colorectal Cancer Screening (Colonoscopy - Every 10 Years) Next due on 10/9/2027      10/09/2017  Colonoscopy (Done)               IMM DTaP/Tdap/Td Vaccine (2 - Td or Tdap) Next due on 6/13/2029 06/13/2019  Imm Admin: Tdap Vaccine              Pneumococcal Vaccine: 65+ Years (Series Information) Completed      10/02/2017  Imm Admin: Pneumococcal polysaccharide vaccine (PPSV-23)    09/28/2016  Imm Admin: Pneumococcal Conjugate Vaccine (Prevnar/PCV-13)              Hepatitis C Screening  Tentatively Complete      10/21/2020  Hepatitis C Antibody component of HEP C VIRUS ANTIBODY              Influenza Vaccine (Series Information) Completed      10/04/2023  Outside Immunization: Influenza Quad Adjuvanted    11/01/2022  Imm Admin: Influenza, Unspecified - HISTORICAL DATA    10/15/2021  Imm Admin: Influenza, Unspecified - HISTORICAL DATA    10/05/2020  Imm Admin: Influenza, Unspecified - HISTORICAL DATA    10/04/2018  Imm Admin: Influenza, Unspecified - HISTORICAL DATA    Only the first 5 history entries have been loaded, but more history exists.              Hepatitis A Vaccine (Hep A) (Series Information) Aged Out      No completion history exists for this topic.              Hepatitis B Vaccine (Hep B) (Series Information) Aged Out      No completion history exists for this topic.              HPV Vaccines (Series Information) Aged Out      No completion history exists for this topic.              Polio Vaccine (Inactivated Polio) (Series Information) Aged Out      No completion history exists for this topic.              Meningococcal Immunization (Series Information) Aged Out      No completion history exists for this topic.              Discontinued - Zoster (Shingles) Vaccines  Discontinued      10/04/2019  Imm Admin: Zoster Vaccine Recombinant (RZV) (SHINGRIX)    02/10/2015  Imm Admin: Zoster Vaccine Live (ZVL) (Zostavax) - HISTORICAL DATA                     Medication review:    Current Outpatient Medications   Medication Sig Dispense Refill    famotidine (PEPCID) 40 MG Tab Take 40 mg by mouth every day.      pantoprazole (PROTONIX)  40 MG Tablet Delayed Response Take 40 mg by mouth every day.      thyroid (ARMOUR THYROID) 60 MG Tab Take 1 Tablet by mouth every day. 90 Tablet 4    thyroid (ARMOUR THYROID) 15 MG Tab Take 1 Tablet by mouth every day. To take in addition to 60 mg Charlotte Thyroid pill daily 90 Tablet 4    fluticasone-salmeterol (ADVAIR) 250-50 MCG/ACT AEROSOL POWDER, BREATH ACTIVATED Inhale 1 Puff 2 times a day. 1 Each 3    albuterol 108 (90 Base) MCG/ACT Aero Soln inhalation aerosol Inhale 2 Puffs every four hours as needed for Shortness of Breath. 1 Each 5    azelastine (ASTELIN) 137 MCG/SPRAY nasal spray Administer 2 Sprays into affected nostril(S) 2 times a day. 30 mL 0    NAPHCON-A 0.025-0.3 % ophthalmic solution PLACE 1 DROP INTO BOTH EYES 4 TIMES DAILY 10 mL 0     No current facility-administered medications for this visit.        If opioid/benzo/sedative on med list discussion of reduction/elimination - referral     At this point document if pt refuses to consider changes off of controlled substance      Vital sign review - unintentional >10 pound weight loss evaluation (BMI and Muscle Mass review)      Whisper test fail - audiology referral      Eye test fail - ophthalmology referral      Stay independent checklist - PT referral for a score of 4 or more      PHQ evaluation - consider visit for medication change/referral      Mini-cog evaluation - consider visit for further evaluation/referral      POLST on file- if not need to have on hand for patient to fill out - consider Palliative referral etc.     ALIYA screen -     Audit-C Questionnaire     1. How often did you have a drink containing alcohol in the past year?      Never (0 points)  * If you answered Never, score questions 2 and 3 below as zero.       Monthly or less (1 point)      2 to 4 times a month (2 points)      2 or 3 times per week (3 points)      4 or more times a week (4 points)          2. How many drinks did you have on a typical day when you were drinking in  the past year?     1 - 2 (0 points)      3 - 4 (1point)      5 - 6 (2 points)     7 - 9 (3 points)     10 or more (4 points)          3. How often did you have 6 or more drinks on one occasion in the past year?      Never (0 points)      Less than monthly (1 point)      Monthly (2 points)      Weekly (3 points)      Daily or almost daily (4 points)          Total:      Scorin or more for men and 3 or more for women is an indication for further evaluation for hazardous drinking.     Chronic pain screen:        PEG: A Three-Item Scale Assessing Pain Intensity and Interference     1. What number best describes your pain on average in the past week?     0       1         2              3                     4                          5              6               7             8           9            10     No pain                                                                                                            Pain as bad as you can imagine          2. What number best describes how, during the past week, pain has interfered     with your enjoyment of life?    0       1         2              3                     4                          5              6               7             8           9            10     No pain                                                                                                            Pain as bad as you can imagine       3. What number best describes how, during the past week, pain has interfered     with your general activity?     0       1         2              3                     4                          5              6               7             8           9            10     No pain                                                                                                            Pain as bad as you can imagine          Scoring: average of 3 scales:     Serves as a baseline measurement for chronic pain issues and quality of life and can be  "used to prompt pain clinic referral.        Tobacco use - consider visit for discussion if user - discuss alternatives and quitting     Food insecurity screen - (The Hunger Vital Sign)    Within the past 12 months were you worried whether food would run out before you got money to buy more?    Often true          sometimes true          never true         Within the past 12 months did the food you bought not last and you did not have money to buy more food?    Often true         sometimes  true          never true         Scoring: answering \"often true\" or \"sometimes true\" to either question is a positive screen for food insecurity and should prompt a social work/ referral if needed     Review of Rise and walk test - PT referral for a score over 13 seconds     Clasp arms behind back and head test - have the patient try and clasp hands behind back and then over head, failure to accomplish may indicate shoulder issues- PT/ortho/pmr referral          Assessment and Plan. The following treatment and monitoring plan is recommended:     1. Medicare annual wellness visit, subsequent  All metrics reviewed and updated.  Will get necessary vaccines done at pharmacy.    2. IFG (impaired fasting glucose)  Good control continue monitoring diet and exercise.  - HEMOGLOBIN A1C; Future    3. Dyslipidemia  Good control continue Mediterranean diet and exercise.  - CBC WITH DIFFERENTIAL; Future  - TSH; Future  - Comp Metabolic Panel; Future  - Lipid Profile; Future    4. Vitamin D deficiency  Good control continue vitamin D3 2000 units daily.  - VITAMIN D,25 HYDROXY (DEFICIENCY); Future    5. Allergic rhinitis, unspecified seasonality, unspecified trigger  Good control continue current regimen  - azelastine (ASTELIN) 137 MCG/SPRAY nasal spray; Administer 2 Sprays into affected nostril(S) 2 times a day.  Dispense: 30 mL; Refill: 0    6. Moderate persistent asthma without complication  Good control continue current " regimen  - fluticasone-salmeterol (ADVAIR) 250-50 MCG/ACT AEROSOL POWDER, BREATH ACTIVATED; Inhale 1 Puff 2 times a day.  Dispense: 1 Each; Refill: 3  - albuterol 108 (90 Base) MCG/ACT Aero Soln inhalation aerosol; Inhale 2 Puffs every four hours as needed for Shortness of Breath.  Dispense: 1 Each; Refill: 5    7. Allergic conjunctivitis of both eyes  Good control continue current regimen- naphazoline-pheniramine (NAPHCON-A) 0.025-0.3 % ophthalmic solution; PLACE 1 DROP INTO BOTH EYES 4 TIMES DAILY  Dispense: 10 mL; Refill: 0    8. Hypothyroidism, acquired, autoimmune  Good control continue current regimen  - thyroid (ARMOUR THYROID) 60 MG Tab; Take 1 Tablet by mouth every day.  Dispense: 90 Tablet; Refill: 4  - thyroid (ARMOUR THYROID) 15 MG Tab; Take 1 Tablet by mouth every day. To take in addition to 60 mg [place] Thyroid pill daily  Dispense: 90 Tablet; Refill: 4    9. Gastroesophageal reflux disease with esophagitis, unspecified whether hemorrhage  Good control continue current regimen  - famotidine (PEPCID) 40 MG Tab; Take 1 Tablet by mouth every day.  Dispense: 90 Tablet; Refill: 3  - pantoprazole (PROTONIX) 40 MG Tablet Delayed Response; Take 1 Tablet by mouth every day.  Dispense: 90 Tablet; Refill: 3  - Referral to Gastroenterology    10. Screening exam for skin cancer     - Referral to Dermatology    11. Primary insomnia  Good control continue current regimen  - ALPRAZolam (XANAX) 0.5 MG Tab; Take 1 Tablet by mouth at bedtime as needed for Sleep or Anxiety for up to 60 days.  Dispense: 60 Tablet; Refill: 2    12. Anxiety       As above  - ALPRAZolam (XANAX) 0.5 MG Tab; Take 1 Tablet by mouth at bedtime as needed for Sleep or Anxiety for up to 60 days.  Dispense: 60 Tablet; Refill: 2        Services suggested: No services needed at this time    Health Care Screening: Age-appropriate preventive services recommended by USPTF and ACIP covered by Medicare were discussed today. Services ordered if indicated and  agreed upon by the patient.    Referrals offered: Community-based lifestyle interventions to reduce health risks and promote self-management and wellness, fall prevention, nutrition, physical activity, tobacco-use cessation, weight loss, and mental health services as per orders if indicated.     Discussion today about general wellness and lifestyle habits:      ? Prevent falls and reduce trip hazards; Cautioned about securing or removing rugs.    ? Have a working fire alarm and carbon monoxide detector;     ? Engage in regular physical activity and social activities     Follow-up: No follow-ups on file.

## 2024-02-23 ENCOUNTER — HOSPITAL ENCOUNTER (OUTPATIENT)
Dept: RADIOLOGY | Facility: MEDICAL CENTER | Age: 74
End: 2024-02-23
Attending: OBSTETRICS & GYNECOLOGY
Payer: MEDICARE

## 2024-02-23 DIAGNOSIS — N64.4 BREAST PAIN: ICD-10-CM

## 2024-02-23 PROCEDURE — G0279 TOMOSYNTHESIS, MAMMO: HCPCS

## 2024-02-23 PROCEDURE — 76642 ULTRASOUND BREAST LIMITED: CPT | Mod: RT

## 2024-03-22 ENCOUNTER — OFFICE VISIT (OUTPATIENT)
Dept: MEDICAL GROUP | Age: 74
End: 2024-03-22
Payer: MEDICARE

## 2024-03-22 VITALS
HEART RATE: 98 BPM | HEIGHT: 60 IN | WEIGHT: 131 LBS | DIASTOLIC BLOOD PRESSURE: 60 MMHG | TEMPERATURE: 98 F | OXYGEN SATURATION: 96 % | BODY MASS INDEX: 25.72 KG/M2 | SYSTOLIC BLOOD PRESSURE: 120 MMHG

## 2024-03-22 DIAGNOSIS — J06.9 ACUTE URI: ICD-10-CM

## 2024-03-22 LAB
FLUAV RNA SPEC QL NAA+PROBE: NEGATIVE
FLUBV RNA SPEC QL NAA+PROBE: NEGATIVE
RSV RNA SPEC QL NAA+PROBE: NEGATIVE
SARS-COV-2 RNA RESP QL NAA+PROBE: NEGATIVE

## 2024-03-22 PROCEDURE — 3078F DIAST BP <80 MM HG: CPT | Performed by: STUDENT IN AN ORGANIZED HEALTH CARE EDUCATION/TRAINING PROGRAM

## 2024-03-22 PROCEDURE — 99213 OFFICE O/P EST LOW 20 MIN: CPT | Performed by: STUDENT IN AN ORGANIZED HEALTH CARE EDUCATION/TRAINING PROGRAM

## 2024-03-22 PROCEDURE — 0241U POCT CEPHEID COV-2, FLU A/B, RSV - PCR: CPT | Performed by: STUDENT IN AN ORGANIZED HEALTH CARE EDUCATION/TRAINING PROGRAM

## 2024-03-22 PROCEDURE — 3074F SYST BP LT 130 MM HG: CPT | Performed by: STUDENT IN AN ORGANIZED HEALTH CARE EDUCATION/TRAINING PROGRAM

## 2024-03-22 ASSESSMENT — FIBROSIS 4 INDEX: FIB4 SCORE: 2.06

## 2024-03-22 ASSESSMENT — PATIENT HEALTH QUESTIONNAIRE - PHQ9: CLINICAL INTERPRETATION OF PHQ2 SCORE: 0

## 2024-03-22 NOTE — PATIENT INSTRUCTIONS
- Continue home meds  - Continue symptomatic treatment  - If not better in 2 weeks make another appt  - If fever, chills, worsening symptoms or SOB come for another assessment or go to the ER or UC

## 2024-03-22 NOTE — PROGRESS NOTES
Chief Complaint   Patient presents with    Pharyngitis     Pt states possible sinus infection. Having sore throat & body ache, ongoing for a couple days,    Constipation     Ongoing for 6 months.        HPI:   Patient is a 73 y.o. female complaining of 3 days of illness including: Dry cough, sore throat, sneezing, nose congestion, muscle aches and malaise..   Mucus is: thin.  Similarly ill exposures: no.  Treatments tried: Day quill one day ago and it help with symptoms.  She  reports that she has never smoked. She has never used smokeless tobacco..     ROS:  No fever, chills, night sweats, SOB, nausea, changes in bowel movements or skin rash.      I reviewed the patient's medications, allergies and medical history:  Current Outpatient Medications   Medication Sig Dispense Refill    ALOE VERA PO Take  by mouth.      azelastine (ASTELIN) 137 MCG/SPRAY nasal spray Administer 2 Sprays into affected nostril(S) 2 times a day. 30 mL 0    fluticasone-salmeterol (ADVAIR) 250-50 MCG/ACT AEROSOL POWDER, BREATH ACTIVATED Inhale 1 Puff 2 times a day. 1 Each 3    albuterol 108 (90 Base) MCG/ACT Aero Soln inhalation aerosol Inhale 2 Puffs every four hours as needed for Shortness of Breath. 1 Each 5    naphazoline-pheniramine (NAPHCON-A) 0.025-0.3 % ophthalmic solution PLACE 1 DROP INTO BOTH EYES 4 TIMES DAILY 10 mL 0    thyroid (ARMOUR THYROID) 60 MG Tab Take 1 Tablet by mouth every day. 90 Tablet 4    thyroid (ARMOUR THYROID) 15 MG Tab Take 1 Tablet by mouth every day. To take in addition to 60 mg [place] Thyroid pill daily 90 Tablet 4    famotidine (PEPCID) 40 MG Tab Take 1 Tablet by mouth every day. 90 Tablet 3    pantoprazole (PROTONIX) 40 MG Tablet Delayed Response Take 1 Tablet by mouth every day. 90 Tablet 3     No current facility-administered medications for this visit.     Augmentin, Compazine, and Amoxicillin-clavulanic acid [amoxicillin-pot clavulanate]  Past Medical History:   Diagnosis Date    Anxiety     GERD  (gastroesophageal reflux disease)     Osteoporosis     Thyroid disease 2019    hashimotos        EXAM:  /60 (BP Location: Left arm, Patient Position: Sitting, BP Cuff Size: Adult)   Pulse 98   Temp 36.7 °C (98 °F) (Temporal)   Ht 1.524 m (5')   Wt 59.4 kg (131 lb)   SpO2 96%   General: Alert, no conversational dyspnea or audible wheeze, non-toxic appearance.  Eyes: PERRL, conjunctiva slightly injected, no eye discharge.  Ears: Normal pinnae,TM's normal bilaterally.  Nares: Patent with thin mucus.  Sinuses: non tender over maxillary / frontal sinuses.  Throat: Mild Erythematous injection without exudate.   Neck: Supple, with no adenopathy.  Lungs: Clear to auscultation bilaterally, no wheeze, crackles or rhonchi.   Heart: Regular rate without murmur.  Skin: Warm and dry without rash.     ASSESSMENT:     1. Acute URI  - Symptoms started 3 days ago  - See HPI for details  - Normal vital signs, normal physical exam, only relevant for mild throat erythema  - Recommended symptomatic treatment   - Negative COVID, RSV and Influenza    - POCT CoV-2, Flu A/B, RSV by PCR      PLAN:  1. Educated patient that majority of upper respiratory infections are viral and do not need antibiotics.   2. Twice daily use of nasal saline rinse or Neti-Pot.  3. OTC anti-pyretics and decongestants as needed.  4. Follow-up in office or urgent care for worsening symptoms, difficulty breathing, lack of expected recovery, or should new symptoms or problems arise.

## 2024-04-16 ENCOUNTER — OFFICE VISIT (OUTPATIENT)
Dept: MEDICAL GROUP | Age: 74
End: 2024-04-16
Payer: MEDICARE

## 2024-04-16 VITALS
HEIGHT: 60 IN | WEIGHT: 134 LBS | SYSTOLIC BLOOD PRESSURE: 116 MMHG | BODY MASS INDEX: 26.31 KG/M2 | RESPIRATION RATE: 16 BRPM | HEART RATE: 68 BPM | DIASTOLIC BLOOD PRESSURE: 62 MMHG | OXYGEN SATURATION: 99 % | TEMPERATURE: 98.1 F

## 2024-04-16 DIAGNOSIS — J30.89 ENVIRONMENTAL AND SEASONAL ALLERGIES: ICD-10-CM

## 2024-04-16 DIAGNOSIS — K59.00 CONSTIPATION, UNSPECIFIED CONSTIPATION TYPE: ICD-10-CM

## 2024-04-16 DIAGNOSIS — K21.00 GASTROESOPHAGEAL REFLUX DISEASE WITH ESOPHAGITIS, UNSPECIFIED WHETHER HEMORRHAGE: ICD-10-CM

## 2024-04-16 DIAGNOSIS — R11.0 NAUSEA: ICD-10-CM

## 2024-04-16 PROCEDURE — 99214 OFFICE O/P EST MOD 30 MIN: CPT | Performed by: PHYSICIAN ASSISTANT

## 2024-04-16 PROCEDURE — 3078F DIAST BP <80 MM HG: CPT | Performed by: PHYSICIAN ASSISTANT

## 2024-04-16 PROCEDURE — 3074F SYST BP LT 130 MM HG: CPT | Performed by: PHYSICIAN ASSISTANT

## 2024-04-16 RX ORDER — PANTOPRAZOLE SODIUM 40 MG/1
40 TABLET, DELAYED RELEASE ORAL DAILY
COMMUNITY
Start: 2023-09-12 | End: 2024-04-16

## 2024-04-16 RX ORDER — FAMOTIDINE 40 MG/1
40 TABLET, FILM COATED ORAL
COMMUNITY
Start: 2023-09-12 | End: 2024-04-16

## 2024-04-16 RX ORDER — FLUCONAZOLE 200 MG/1
200 TABLET ORAL DAILY
COMMUNITY
Start: 2023-08-31 | End: 2024-04-16

## 2024-04-16 RX ORDER — FLUOCINOLONE ACETONIDE 0.11 MG/ML
OIL TOPICAL
COMMUNITY
Start: 2024-03-26 | End: 2024-04-16

## 2024-04-16 RX ORDER — ONDANSETRON 4 MG/1
4 TABLET, FILM COATED ORAL EVERY 4 HOURS PRN
Qty: 20 TABLET | Refills: 0 | Status: SHIPPED | OUTPATIENT
Start: 2024-04-16 | End: 2024-05-16

## 2024-04-16 ASSESSMENT — FIBROSIS 4 INDEX: FIB4 SCORE: 2.06

## 2024-04-16 NOTE — PROGRESS NOTES
cc: Nausea, epigastric pain, left ear itching    Subjective:     HPI    History of Present Illness  The patient is a 73-year-old female, PCP Dr. Meza, unable to see today.    The patient has been experiencing intermittent nausea for the past 2 to 3 weeks, accompanied by a sensation of epigastric tenderness...  Pain does not radiate up her chest and does not intensify when she lies down at night. She has not experienced any episodes of vomiting or taken any over-the-counter medications for her nausea.  She does have associated  constipation, bloating, and excessive gas. She was seen by her gastroenterologist 6 days ago.  Who recommended kimchi and probiotics.  Suggesting 20 mg of pantoprazole in the morning, 40 mg of Pepcid at night.  She has previously used Benefiber and Smooth Move, but has exhausted her supply. Her last bowel movement was yesterday, and prior to that, her bowel movements have been irregular. Her bowel movements can last from a day to a few days. She denies any presence of blood or black tarry stools, fever, diarrhea.    The patient also reports pruritus in her ear, which she suspects may be related to a sinus infection. This symptom has been present for several weeks.  She has associated rhinorrhea, postnasal drip.  She utilizes a nasal rinse and spray for her allergies.  Denies sinus pressure, ear pain, sinus pressure, cough, wheezing, shortness of breath          Review of systems:  See above.       Current Outpatient Medications:     ondansetron (ZOFRAN) 4 MG Tab tablet, Take 1 Tablet by mouth every four hours as needed for Nausea/Vomiting for up to 30 days., Disp: 20 Tablet, Rfl: 0    ALOE VERA PO, Take  by mouth., Disp: , Rfl:     azelastine (ASTELIN) 137 MCG/SPRAY nasal spray, Administer 2 Sprays into affected nostril(S) 2 times a day., Disp: 30 mL, Rfl: 0    fluticasone-salmeterol (ADVAIR) 250-50 MCG/ACT AEROSOL POWDER, BREATH ACTIVATED, Inhale 1 Puff 2 times a day., Disp: 1 Each, Rfl: 3     albuterol 108 (90 Base) MCG/ACT Aero Soln inhalation aerosol, Inhale 2 Puffs every four hours as needed for Shortness of Breath., Disp: 1 Each, Rfl: 5    naphazoline-pheniramine (NAPHCON-A) 0.025-0.3 % ophthalmic solution, PLACE 1 DROP INTO BOTH EYES 4 TIMES DAILY, Disp: 10 mL, Rfl: 0    thyroid (ARMOUR THYROID) 60 MG Tab, Take 1 Tablet by mouth every day., Disp: 90 Tablet, Rfl: 4    thyroid (ARMOUR THYROID) 15 MG Tab, Take 1 Tablet by mouth every day. To take in addition to 60 mg [place] Thyroid pill daily, Disp: 90 Tablet, Rfl: 4    pantoprazole (PROTONIX) 40 MG Tablet Delayed Response, Take 1 Tablet by mouth every day., Disp: 90 Tablet, Rfl: 3    Allergies, past medical history, past surgical history, family history, social history reviewed and updated    Objective:     Vitals: /62 (BP Location: Left arm, Patient Position: Sitting, BP Cuff Size: Adult)   Pulse 68   Temp 36.7 °C (98.1 °F) (Temporal)   Resp 16   Ht 1.524 m (5')   Wt 60.8 kg (134 lb)   SpO2 99%   BMI 26.17 kg/m²   General: Alert, pleasant, NAD  HEENT: Normocephalic. Neck supple.  TMs gray, retracted bilaterally.  No sinus pressure tenderness.  Normal posterior pharynx.  No tonsillar enlargement or exudate.  Uvula midline.  No thyromegaly or masses palpated. No cervical or supraclavicular lymphadenopathy. No carotid bruits   Heart: Regular rate and rhythm.  S1 and S2 normal.  No murmurs appreciated.  Respiratory: Normal respiratory effort.  Clear to auscultation bilaterally.  Abdomen:  Normoactive bowel sounds.  Non-distended, soft, minimal epigastric tenderness.  No guarding/rebound. No hepatosplenomegaly.    Skin: Warm, dry, no rashes.  Psych:  Affect/mood is normal, judgement is good, memory is intact, grooming is appropriate.    Assessment/Plan:     Yaz was seen today for nausea and itching.    Diagnoses and all orders for this visit:    Gastroesophageal reflux disease with esophagitis, unspecified whether hemorrhage  She was seen  by her gastroenterologist 6 days ago.  Advised to continue with treatment plans per recommendations of GI, especially as symptoms have not worsened.  Continue 20 mg Protonix in the morning, 40 mg Pepcid at night.  Continue to follow-up with GI.    Nausea  -Could be due to worsening heartburn versus constipation.  Given Zofran to use as needed.  If she starts to experience vomiting, worsening constipation she needs to follow-up for reevaluation immediately  -     ondansetron (ZOFRAN) 4 MG Tab tablet; Take 1 Tablet by mouth every four hours as needed for Nausea/Vomiting for up to 30 days.    Constipation, unspecified constipation type  Advised to continue with treatment plan as discussed by GI, Metamucil or Benefiber daily, increasing probiotics.  Good hydration.  She is having bowel movements every few days.     Environmental and seasonal allergies  Believe the itching in her ear is more attributed to allergies.  No signs of infection.  Continue with as Astelin spray.      Return in about 5 months (around 9/16/2024) for w pcp for continued medication management.

## 2024-08-29 ENCOUNTER — TELEPHONE (OUTPATIENT)
Dept: MEDICAL GROUP | Age: 74
End: 2024-08-29
Payer: MEDICARE

## 2024-09-02 DIAGNOSIS — F41.9 ANXIETY: ICD-10-CM

## 2024-09-02 DIAGNOSIS — F51.01 PRIMARY INSOMNIA: ICD-10-CM

## 2024-09-03 RX ORDER — ALPRAZOLAM 0.5 MG
0.5 TABLET ORAL NIGHTLY PRN
Qty: 90 TABLET | Refills: 1 | Status: SHIPPED | OUTPATIENT
Start: 2024-09-03 | End: 2024-12-02

## 2024-09-04 ENCOUNTER — HOSPITAL ENCOUNTER (OUTPATIENT)
Dept: LAB | Facility: MEDICAL CENTER | Age: 74
End: 2024-09-04
Attending: INTERNAL MEDICINE
Payer: MEDICARE

## 2024-09-04 DIAGNOSIS — R73.01 IFG (IMPAIRED FASTING GLUCOSE): ICD-10-CM

## 2024-09-04 DIAGNOSIS — E55.9 VITAMIN D DEFICIENCY: ICD-10-CM

## 2024-09-04 DIAGNOSIS — E78.5 DYSLIPIDEMIA: ICD-10-CM

## 2024-09-04 LAB
25(OH)D3 SERPL-MCNC: 72 NG/ML (ref 30–100)
ALBUMIN SERPL BCP-MCNC: 4.1 G/DL (ref 3.2–4.9)
ALBUMIN/GLOB SERPL: 1.4 G/DL
ALP SERPL-CCNC: 75 U/L (ref 30–99)
ALT SERPL-CCNC: 17 U/L (ref 2–50)
ANION GAP SERPL CALC-SCNC: 10 MMOL/L (ref 7–16)
AST SERPL-CCNC: 22 U/L (ref 12–45)
BASOPHILS # BLD AUTO: 0.2 % (ref 0–1.8)
BASOPHILS # BLD: 0.01 K/UL (ref 0–0.12)
BILIRUB SERPL-MCNC: 0.5 MG/DL (ref 0.1–1.5)
BUN SERPL-MCNC: 12 MG/DL (ref 8–22)
CALCIUM ALBUM COR SERPL-MCNC: 9.2 MG/DL (ref 8.5–10.5)
CALCIUM SERPL-MCNC: 9.3 MG/DL (ref 8.4–10.2)
CHLORIDE SERPL-SCNC: 103 MMOL/L (ref 96–112)
CHOLEST SERPL-MCNC: 213 MG/DL (ref 100–199)
CO2 SERPL-SCNC: 25 MMOL/L (ref 20–33)
CREAT SERPL-MCNC: 0.54 MG/DL (ref 0.5–1.4)
EOSINOPHIL # BLD AUTO: 0.18 K/UL (ref 0–0.51)
EOSINOPHIL NFR BLD: 3.6 % (ref 0–6.9)
ERYTHROCYTE [DISTWIDTH] IN BLOOD BY AUTOMATED COUNT: 45.8 FL (ref 35.9–50)
EST. AVERAGE GLUCOSE BLD GHB EST-MCNC: 111 MG/DL
FASTING STATUS PATIENT QL REPORTED: NORMAL
GFR SERPLBLD CREATININE-BSD FMLA CKD-EPI: 96 ML/MIN/1.73 M 2
GLOBULIN SER CALC-MCNC: 2.9 G/DL (ref 1.9–3.5)
GLUCOSE SERPL-MCNC: 95 MG/DL (ref 65–99)
HBA1C MFR BLD: 5.5 % (ref 4–5.6)
HCT VFR BLD AUTO: 43 % (ref 37–47)
HDLC SERPL-MCNC: 88 MG/DL
HGB BLD-MCNC: 14.5 G/DL (ref 12–16)
IMM GRANULOCYTES # BLD AUTO: 0.03 K/UL (ref 0–0.11)
IMM GRANULOCYTES NFR BLD AUTO: 0.6 % (ref 0–0.9)
LDLC SERPL CALC-MCNC: 104 MG/DL
LYMPHOCYTES # BLD AUTO: 1.54 K/UL (ref 1–4.8)
LYMPHOCYTES NFR BLD: 30.7 % (ref 22–41)
MCH RBC QN AUTO: 33.1 PG (ref 27–33)
MCHC RBC AUTO-ENTMCNC: 33.7 G/DL (ref 32.2–35.5)
MCV RBC AUTO: 98.2 FL (ref 81.4–97.8)
MONOCYTES # BLD AUTO: 0.51 K/UL (ref 0–0.85)
MONOCYTES NFR BLD AUTO: 10.2 % (ref 0–13.4)
NEUTROPHILS # BLD AUTO: 2.74 K/UL (ref 1.82–7.42)
NEUTROPHILS NFR BLD: 54.7 % (ref 44–72)
NRBC # BLD AUTO: 0 K/UL
NRBC BLD-RTO: 0 /100 WBC (ref 0–0.2)
PLATELET # BLD AUTO: 209 K/UL (ref 164–446)
PMV BLD AUTO: 11.4 FL (ref 9–12.9)
POTASSIUM SERPL-SCNC: 3.7 MMOL/L (ref 3.6–5.5)
PROT SERPL-MCNC: 7 G/DL (ref 6–8.2)
RBC # BLD AUTO: 4.38 M/UL (ref 4.2–5.4)
SODIUM SERPL-SCNC: 138 MMOL/L (ref 135–145)
TRIGL SERPL-MCNC: 104 MG/DL (ref 0–149)
TSH SERPL DL<=0.005 MIU/L-ACNC: 7.55 UIU/ML (ref 0.38–5.33)
WBC # BLD AUTO: 5 K/UL (ref 4.8–10.8)

## 2024-09-04 PROCEDURE — 85025 COMPLETE CBC W/AUTO DIFF WBC: CPT

## 2024-09-04 PROCEDURE — 80061 LIPID PANEL: CPT

## 2024-09-04 PROCEDURE — 83036 HEMOGLOBIN GLYCOSYLATED A1C: CPT

## 2024-09-04 PROCEDURE — 82306 VITAMIN D 25 HYDROXY: CPT

## 2024-09-04 PROCEDURE — 36415 COLL VENOUS BLD VENIPUNCTURE: CPT

## 2024-09-04 PROCEDURE — 80053 COMPREHEN METABOLIC PANEL: CPT

## 2024-09-04 PROCEDURE — 84443 ASSAY THYROID STIM HORMONE: CPT

## 2024-09-10 ENCOUNTER — OFFICE VISIT (OUTPATIENT)
Dept: MEDICAL GROUP | Age: 74
End: 2024-09-10
Payer: MEDICARE

## 2024-09-10 VITALS
DIASTOLIC BLOOD PRESSURE: 58 MMHG | WEIGHT: 124 LBS | TEMPERATURE: 98.1 F | SYSTOLIC BLOOD PRESSURE: 104 MMHG | HEART RATE: 89 BPM | OXYGEN SATURATION: 98 % | HEIGHT: 60 IN | BODY MASS INDEX: 24.35 KG/M2

## 2024-09-10 DIAGNOSIS — E06.3 HYPOTHYROIDISM, ACQUIRED, AUTOIMMUNE: ICD-10-CM

## 2024-09-10 DIAGNOSIS — I25.83 CORONARY ARTERY DISEASE DUE TO LIPID RICH PLAQUE: ICD-10-CM

## 2024-09-10 DIAGNOSIS — Z98.890 S/P COLONOSCOPIC POLYPECTOMY: ICD-10-CM

## 2024-09-10 DIAGNOSIS — F51.01 PRIMARY INSOMNIA: ICD-10-CM

## 2024-09-10 DIAGNOSIS — E78.5 DYSLIPIDEMIA: ICD-10-CM

## 2024-09-10 DIAGNOSIS — F43.21 GRIEVING: ICD-10-CM

## 2024-09-10 DIAGNOSIS — F43.29 PROLONGED GRIEF REACTION: ICD-10-CM

## 2024-09-10 DIAGNOSIS — H10.13 ALLERGIC CONJUNCTIVITIS OF BOTH EYES: ICD-10-CM

## 2024-09-10 DIAGNOSIS — I25.10 CORONARY ARTERY DISEASE DUE TO LIPID RICH PLAQUE: ICD-10-CM

## 2024-09-10 DIAGNOSIS — D23.9 MULTIPLE DYSPLASTIC NEVI: ICD-10-CM

## 2024-09-10 DIAGNOSIS — K21.00 GASTROESOPHAGEAL REFLUX DISEASE WITH ESOPHAGITIS, UNSPECIFIED WHETHER HEMORRHAGE: ICD-10-CM

## 2024-09-10 DIAGNOSIS — F41.9 ANXIETY: ICD-10-CM

## 2024-09-10 DIAGNOSIS — K57.90 DIVERTICULOSIS: ICD-10-CM

## 2024-09-10 DIAGNOSIS — J45.40 MODERATE PERSISTENT ASTHMA WITHOUT COMPLICATION: ICD-10-CM

## 2024-09-10 PROBLEM — R05.3 POST-COVID-19 SYNDROME MANIFESTING AS CHRONIC COUGH: Status: RESOLVED | Noted: 2022-07-12 | Resolved: 2024-09-10

## 2024-09-10 PROBLEM — U09.9: Status: RESOLVED | Noted: 2022-07-12 | Resolved: 2024-09-10

## 2024-09-10 PROBLEM — J20.8 ACUTE BRONCHITIS DUE TO COVID-19 VIRUS: Status: RESOLVED | Noted: 2022-07-12 | Resolved: 2024-09-10

## 2024-09-10 PROBLEM — G93.32 POST-COVID-19 SYNDROME MANIFESTING AS CHRONIC FATIGUE: Status: RESOLVED | Noted: 2022-07-12 | Resolved: 2024-09-10

## 2024-09-10 PROBLEM — Z74.09: Status: RESOLVED | Noted: 2022-07-12 | Resolved: 2024-09-10

## 2024-09-10 PROBLEM — U09.9 POST-COVID CHRONIC NEUROLOGIC SYMPTOMS: Status: RESOLVED | Noted: 2022-07-12 | Resolved: 2024-09-10

## 2024-09-10 PROBLEM — U07.1 ACUTE BRONCHITIS DUE TO COVID-19 VIRUS: Status: RESOLVED | Noted: 2022-07-12 | Resolved: 2024-09-10

## 2024-09-10 PROBLEM — U09.9 POST-COVID-19 SYNDROME MANIFESTING AS CHRONIC COUGH: Status: RESOLVED | Noted: 2022-07-12 | Resolved: 2024-09-10

## 2024-09-10 PROBLEM — R41.89 BRAIN FOG: Status: RESOLVED | Noted: 2022-08-03 | Resolved: 2024-09-10

## 2024-09-10 PROBLEM — R29.90 POST-COVID CHRONIC NEUROLOGIC SYMPTOMS: Status: RESOLVED | Noted: 2022-07-12 | Resolved: 2024-09-10

## 2024-09-10 PROBLEM — U09.9 POST-COVID-19 SYNDROME MANIFESTING AS CHRONIC FATIGUE: Status: RESOLVED | Noted: 2022-07-12 | Resolved: 2024-09-10

## 2024-09-10 PROCEDURE — 99214 OFFICE O/P EST MOD 30 MIN: CPT | Performed by: INTERNAL MEDICINE

## 2024-09-10 PROCEDURE — 3078F DIAST BP <80 MM HG: CPT | Performed by: INTERNAL MEDICINE

## 2024-09-10 PROCEDURE — 3074F SYST BP LT 130 MM HG: CPT | Performed by: INTERNAL MEDICINE

## 2024-09-10 RX ORDER — THYROID 60 MG/1
60 TABLET ORAL DAILY
Qty: 90 TABLET | Refills: 4 | Status: SHIPPED | OUTPATIENT
Start: 2024-09-10

## 2024-09-10 RX ORDER — FLUTICASONE PROPIONATE AND SALMETEROL 250; 50 UG/1; UG/1
1 POWDER RESPIRATORY (INHALATION) 2 TIMES DAILY
Qty: 1 EACH | Refills: 3 | Status: SHIPPED | OUTPATIENT
Start: 2024-09-10

## 2024-09-10 RX ORDER — NAPHAZOLINE HYDROCHLORIDE AND PHENIRAMINE MALEATE .25; 3 MG/ML; MG/ML
SOLUTION/ DROPS OPHTHALMIC
Qty: 10 ML | Refills: 0 | Status: SHIPPED | OUTPATIENT
Start: 2024-09-10

## 2024-09-10 RX ORDER — PANTOPRAZOLE SODIUM 40 MG/1
40 TABLET, DELAYED RELEASE ORAL DAILY
Qty: 90 TABLET | Refills: 2 | Status: SHIPPED | OUTPATIENT
Start: 2024-09-10

## 2024-09-10 RX ORDER — THYROID 15 MG/1
15 TABLET ORAL DAILY
Qty: 90 TABLET | Refills: 4 | Status: SHIPPED | OUTPATIENT
Start: 2024-09-10

## 2024-09-10 ASSESSMENT — ENCOUNTER SYMPTOMS
INSOMNIA: 1
NERVOUS/ANXIOUS: 1

## 2024-09-10 ASSESSMENT — FIBROSIS 4 INDEX: FIB4 SCORE: 1.91

## 2024-09-10 NOTE — ASSESSMENT & PLAN NOTE
No suspicious lesions noted but patient was reassurance with dermatologist.  Orders:    Referral to Dermatology

## 2024-09-10 NOTE — ASSESSMENT & PLAN NOTE
Prolonged grieving patient counseled.  Declines referral to counseling and declines antidepressant.  Will revisit this with each visit.

## 2024-09-10 NOTE — PROGRESS NOTES
Subjective     Yaz Farooq is a 74 y.o. female who presents with Follow-Up (Lab review  wants CT scan of her heart due to having heart issues in family )  Patient is here for follow-up of her multiple medical problems as listed below.  No new issues, however she continues to grieve the loss of her  from 3 years ago but declines counseling or antidepressant therapy.  She is concerned about possible heart disease and due to strong family history of this and would like coronary calcification study done.  She is on no blood pressure or cholesterol medication and her cholesterol testing has been excellent except for borderline elevated LDL but super good HDL of 88.  Triglycerides normal blood count normal.    Her asthma has been under good control with Advair and as needed albuterol.  Thyroid is well-controlled on [place] Thyroid 75 mcg daily.    Patient would like referral to dermatology because of suspicious skin lesions that she wants evaluated annually.    Patient has chronic anxiety for which she takes alprazolam and this is also used as needed for sleep occasionally but not regularly.  Does not need a refill today she says but she will in the future.  Patient Active Problem List   Diagnosis    Grieving    GERD (gastroesophageal reflux disease)    Hypothyroidism, acquired, autoimmune    IFG (impaired fasting glucose)    Environmental and seasonal allergies    Allergic conjunctivitis of both eyes    Dysphagia    Primary insomnia    Moderate persistent asthma without complication    Age-related osteoporosis without current pathological fracture    Dyslipidemia    Anxiety    Multiple dysplastic nevi       Outpatient Medications Prior to Visit   Medication Sig Dispense Refill    ALPRAZolam (XANAX) 0.5 MG Tab Take 1 Tablet by mouth at bedtime as needed for Sleep or Anxiety for up to 90 days. 90 Tablet 1    ALOE VERA PO Take  by mouth.      azelastine (ASTELIN) 137 MCG/SPRAY nasal spray Administer 2 Sprays  into affected nostril(S) 2 times a day. 30 mL 0    albuterol 108 (90 Base) MCG/ACT Aero Soln inhalation aerosol Inhale 2 Puffs every four hours as needed for Shortness of Breath. 1 Each 5    fluticasone-salmeterol (ADVAIR) 250-50 MCG/ACT AEROSOL POWDER, BREATH ACTIVATED Inhale 1 Puff 2 times a day. 1 Each 3    naphazoline-pheniramine (NAPHCON-A) 0.025-0.3 % ophthalmic solution PLACE 1 DROP INTO BOTH EYES 4 TIMES DAILY 10 mL 0    thyroid (ARMOUR THYROID) 60 MG Tab Take 1 Tablet by mouth every day. 90 Tablet 4    thyroid (ARMOUR THYROID) 15 MG Tab Take 1 Tablet by mouth every day. To take in addition to 60 mg [place] Thyroid pill daily 90 Tablet 4    pantoprazole (PROTONIX) 40 MG Tablet Delayed Response Take 1 Tablet by mouth every day. 90 Tablet 3     No facility-administered medications prior to visit.               HPI    Review of Systems   Psychiatric/Behavioral:  The patient is nervous/anxious and has insomnia.    Denies any chest pain PND orthopnea.  No exertional chest pain.           Objective     /58 (BP Location: Right arm, Patient Position: Sitting, BP Cuff Size: Adult)   Pulse 89   Temp 36.7 °C (98.1 °F) (Temporal)   Ht 1.524 m (5')   Wt 56.2 kg (124 lb)   SpO2 98%   BMI 24.22 kg/m²      Physical Exam  Vitals reviewed.   Constitutional:       General: She is not in acute distress.     Appearance: She is well-developed. She is not diaphoretic.   HENT:      Head: Normocephalic and atraumatic.      Right Ear: External ear normal.      Left Ear: External ear normal.      Nose: Nose normal.      Mouth/Throat:      Pharynx: No oropharyngeal exudate.   Eyes:      General: No scleral icterus.        Right eye: No discharge.         Left eye: No discharge.      Conjunctiva/sclera: Conjunctivae normal.      Pupils: Pupils are equal, round, and reactive to light.   Neck:      Thyroid: No thyromegaly.      Vascular: No JVD.      Trachea: No tracheal deviation.   Cardiovascular:      Rate and Rhythm: Normal  rate and regular rhythm.      Heart sounds: Normal heart sounds. No murmur heard.     No friction rub. No gallop.   Pulmonary:      Effort: Pulmonary effort is normal. No respiratory distress.      Breath sounds: Normal breath sounds. No stridor. No wheezing or rales.   Chest:      Chest wall: No tenderness.   Abdominal:      General: Bowel sounds are normal. There is no distension.      Palpations: Abdomen is soft. There is no mass.      Tenderness: There is no abdominal tenderness. There is no guarding or rebound.   Musculoskeletal:         General: No tenderness. Normal range of motion.      Cervical back: Normal range of motion and neck supple.   Lymphadenopathy:      Cervical: No cervical adenopathy.   Skin:     General: Skin is warm and dry.      Coloration: Skin is not pale.      Findings: No erythema or rash.   Neurological:      Mental Status: She is alert and oriented to person, place, and time.      Cranial Nerves: No cranial nerve deficit.      Motor: No abnormal muscle tone.      Coordination: Coordination normal.      Deep Tendon Reflexes: Reflexes are normal and symmetric. Reflexes normal.   Psychiatric:         Behavior: Behavior normal.         Thought Content: Thought content normal.         Judgment: Judgment normal.                    Hospital Outpatient Visit on 09/04/2024   Component Date Value    25-Hydroxy   Vitamin D 25 09/04/2024 72     Glycohemoglobin 09/04/2024 5.5     Est Avg Glucose 09/04/2024 111     Cholesterol,Tot 09/04/2024 213 (H)     Triglycerides 09/04/2024 104     HDL 09/04/2024 88     LDL 09/04/2024 104 (H)     Sodium 09/04/2024 138     Potassium 09/04/2024 3.7     Chloride 09/04/2024 103     Co2 09/04/2024 25     Anion Gap 09/04/2024 10.0     Glucose 09/04/2024 95     Bun 09/04/2024 12     Creatinine 09/04/2024 0.54     Calcium 09/04/2024 9.3     Correct Calcium 09/04/2024 9.2     AST(SGOT) 09/04/2024 22     ALT(SGPT) 09/04/2024 17     Alkaline Phosphatase 09/04/2024 75      Total Bilirubin 09/04/2024 0.5     Albumin 09/04/2024 4.1     Total Protein 09/04/2024 7.0     Globulin 09/04/2024 2.9     A-G Ratio 09/04/2024 1.4     TSH 09/04/2024 7.550 (H)     WBC 09/04/2024 5.0     RBC 09/04/2024 4.38     Hemoglobin 09/04/2024 14.5     Hematocrit 09/04/2024 43.0     MCV 09/04/2024 98.2 (H)     MCH 09/04/2024 33.1 (H)     MCHC 09/04/2024 33.7     RDW 09/04/2024 45.8     Platelet Count 09/04/2024 209     MPV 09/04/2024 11.4     Neutrophils-Polys 09/04/2024 54.70     Lymphocytes 09/04/2024 30.70     Monocytes 09/04/2024 10.20     Eosinophils 09/04/2024 3.60     Basophils 09/04/2024 0.20     Immature Granulocytes 09/04/2024 0.60     Nucleated RBC 09/04/2024 0.00     Neutrophils (Absolute) 09/04/2024 2.74     Lymphs (Absolute) 09/04/2024 1.54     Monos (Absolute) 09/04/2024 0.51     Eos (Absolute) 09/04/2024 0.18     Baso (Absolute) 09/04/2024 0.01     Immature Granulocytes (a* 09/04/2024 0.03     NRBC (Absolute) 09/04/2024 0.00     Fasting Status 09/04/2024 Fasting     GFR (CKD-EPI) 09/04/2024 96       Hospital Outpatient Visit on 09/04/2024   Component Date Value    25-Hydroxy   Vitamin D 25 09/04/2024 72     Glycohemoglobin 09/04/2024 5.5     Est Avg Glucose 09/04/2024 111     Cholesterol,Tot 09/04/2024 213 (H)     Triglycerides 09/04/2024 104     HDL 09/04/2024 88     LDL 09/04/2024 104 (H)     Sodium 09/04/2024 138     Potassium 09/04/2024 3.7     Chloride 09/04/2024 103     Co2 09/04/2024 25     Anion Gap 09/04/2024 10.0     Glucose 09/04/2024 95     Bun 09/04/2024 12     Creatinine 09/04/2024 0.54     Calcium 09/04/2024 9.3     Correct Calcium 09/04/2024 9.2     AST(SGOT) 09/04/2024 22     ALT(SGPT) 09/04/2024 17     Alkaline Phosphatase 09/04/2024 75     Total Bilirubin 09/04/2024 0.5     Albumin 09/04/2024 4.1     Total Protein 09/04/2024 7.0     Globulin 09/04/2024 2.9     A-G Ratio 09/04/2024 1.4     TSH 09/04/2024 7.550 (H)     WBC 09/04/2024 5.0     RBC 09/04/2024 4.38     Hemoglobin  09/04/2024 14.5     Hematocrit 09/04/2024 43.0     MCV 09/04/2024 98.2 (H)     MCH 09/04/2024 33.1 (H)     MCHC 09/04/2024 33.7     RDW 09/04/2024 45.8     Platelet Count 09/04/2024 209     MPV 09/04/2024 11.4     Neutrophils-Polys 09/04/2024 54.70     Lymphocytes 09/04/2024 30.70     Monocytes 09/04/2024 10.20     Eosinophils 09/04/2024 3.60     Basophils 09/04/2024 0.20     Immature Granulocytes 09/04/2024 0.60     Nucleated RBC 09/04/2024 0.00     Neutrophils (Absolute) 09/04/2024 2.74     Lymphs (Absolute) 09/04/2024 1.54     Monos (Absolute) 09/04/2024 0.51     Eos (Absolute) 09/04/2024 0.18     Baso (Absolute) 09/04/2024 0.01     Immature Granulocytes (a* 09/04/2024 0.03     NRBC (Absolute) 09/04/2024 0.00     Fasting Status 09/04/2024 Fasting     GFR (CKD-EPI) 09/04/2024 96                Assessment & Plan        Assessment & Plan  Dyslipidemia  Check CT cardiac scoring and if positive strongly consider low-dose statin which she is not inclined to take but is worried about possible coronary disease.  Orders:    CT-CARDIAC SCORING; Future    Coronary artery disease due to lipid rich plaque  As above  Orders:    CT-CARDIAC SCORING; Future    Hypothyroidism, acquired, autoimmune  Good control continue current regimen  Orders:    thyroid (ARMOUR THYROID) 15 MG Tab; Take 1 Tablet by mouth every day. To take in addition to 60 mg [place] Thyroid pill daily    thyroid (ARMOUR THYROID) 60 MG Tab; Take 1 Tablet by mouth every day.    Anxiety  Good control continue current regimen with alprazolam 0.5 mg once a day as needed as needed for sleep or anxiety.       Multiple dysplastic nevi  No suspicious lesions noted but patient was reassurance with dermatologist.  Orders:    Referral to Dermatology    Primary insomnia  Good control with as needed at home presently on 0.5 mg at bedtime as needed sleep.  Refill when requested.  Not using on a nightly basis.       Grieving  Prolonged grieving patient counseled.  Declines  referral to counseling and declines antidepressant.  Will revisit this with each visit.       Gastroesophageal reflux disease with esophagitis, unspecified whether hemorrhage  Under good control with PPI.  Continue Protonix unchanged.  Recent upper endoscopy showed changes consistent with mild reflux but no changes consistent with Becerra's disease.  Patient reassured of these results from a few weeks ago on EGD.  Orders:    pantoprazole (PROTONIX) 40 MG Tablet Delayed Response; Take 1 Tablet by mouth every day.    Moderate persistent asthma without complication  Good control continue current regimen  Orders:    fluticasone-salmeterol (ADVAIR) 250-50 MCG/ACT AEROSOL POWDER, BREATH ACTIVATED; Inhale 1 Puff 2 times a day.    Allergic conjunctivitis of both eyes  Good control continue current regimen  Orders:    naphazoline-pheniramine (NAPHCON-A) 0.025-0.3 % ophthalmic solution; PLACE 1 DROP INTO BOTH EYES 4 TIMES DAILY    S/P colonoscopic polypectomy-August 2024, tubular adenoma 10 mm  -August 2024, tubular adenoma 10 mm resected.  Repeat in 3 years DHA       Diverticulosis

## 2024-09-10 NOTE — ASSESSMENT & PLAN NOTE
Good control continue current regimen  Orders:    fluticasone-salmeterol (ADVAIR) 250-50 MCG/ACT AEROSOL POWDER, BREATH ACTIVATED; Inhale 1 Puff 2 times a day.

## 2024-09-10 NOTE — ASSESSMENT & PLAN NOTE
Good control continue current regimen with alprazolam 0.5 mg once a day as needed as needed for sleep or anxiety.

## 2024-09-10 NOTE — ASSESSMENT & PLAN NOTE
Good control continue current regimen  Orders:    thyroid (ARMOUR THYROID) 15 MG Tab; Take 1 Tablet by mouth every day. To take in addition to 60 mg [place] Thyroid pill daily    thyroid (ARMOUR THYROID) 60 MG Tab; Take 1 Tablet by mouth every day.

## 2024-09-10 NOTE — ASSESSMENT & PLAN NOTE
Good control with as needed at home presently on 0.5 mg at bedtime as needed sleep.  Refill when requested.  Not using on a nightly basis.

## 2024-09-10 NOTE — ASSESSMENT & PLAN NOTE
Good control continue current regimen  Orders:    naphazoline-pheniramine (NAPHCON-A) 0.025-0.3 % ophthalmic solution; PLACE 1 DROP INTO BOTH EYES 4 TIMES DAILY

## 2024-09-10 NOTE — ASSESSMENT & PLAN NOTE
Under good control with PPI.  Continue Protonix unchanged.  Recent upper endoscopy showed changes consistent with mild reflux but no changes consistent with Becerra's disease.  Patient reassured of these results from a few weeks ago on EGD.  Orders:    pantoprazole (PROTONIX) 40 MG Tablet Delayed Response; Take 1 Tablet by mouth every day.

## 2024-09-10 NOTE — ASSESSMENT & PLAN NOTE
Check CT cardiac scoring and if positive strongly consider low-dose statin which she is not inclined to take but is worried about possible coronary disease.  Orders:    CT-CARDIAC SCORING; Future

## 2024-09-15 ENCOUNTER — HOSPITAL ENCOUNTER (OUTPATIENT)
Dept: RADIOLOGY | Facility: MEDICAL CENTER | Age: 74
End: 2024-09-15
Attending: INTERNAL MEDICINE
Payer: COMMERCIAL

## 2024-09-15 DIAGNOSIS — I25.83 CORONARY ARTERY DISEASE DUE TO LIPID RICH PLAQUE: ICD-10-CM

## 2024-09-15 DIAGNOSIS — E78.5 DYSLIPIDEMIA: ICD-10-CM

## 2024-09-15 DIAGNOSIS — I25.10 CORONARY ARTERY DISEASE DUE TO LIPID RICH PLAQUE: ICD-10-CM

## 2024-09-15 PROCEDURE — 4410556 CT-CARDIAC SCORING (SELF PAY ONLY)

## 2024-09-16 DIAGNOSIS — E78.5 DYSLIPIDEMIA: ICD-10-CM

## 2024-09-16 DIAGNOSIS — R93.1 ABNORMAL SCREENING CARDIAC CT: ICD-10-CM

## 2024-09-16 DIAGNOSIS — I25.10 CORONARY ARTERY DISEASE DUE TO LIPID RICH PLAQUE: ICD-10-CM

## 2024-09-16 DIAGNOSIS — I25.83 CORONARY ARTERY DISEASE DUE TO LIPID RICH PLAQUE: ICD-10-CM

## 2024-09-16 RX ORDER — ROSUVASTATIN CALCIUM 10 MG/1
10 TABLET, COATED ORAL EVERY EVENING
Qty: 90 TABLET | Refills: 2 | Status: SHIPPED | OUTPATIENT
Start: 2024-09-16

## 2024-09-16 NOTE — PROGRESS NOTES
Patient CT cardiac scoring was markedly elevated and therefore patient should be on statin to reduce her risk for heart attack or stroke, particularly in view of her family history.  Prescription for low-dose statin (Crestor 10 mg) sent to pharmacy.

## 2024-09-17 ENCOUNTER — TELEPHONE (OUTPATIENT)
Dept: MEDICAL GROUP | Age: 74
End: 2024-09-17
Payer: MEDICARE

## 2024-09-17 NOTE — TELEPHONE ENCOUNTER
Spoke with patient about her CT Scoring and that you want her to start on statin , she would like to know how bad it is and if she really needs to take the statin , she just has concerns about taking it please advise

## 2024-10-18 ENCOUNTER — TELEPHONE (OUTPATIENT)
Dept: MEDICAL GROUP | Facility: LAB | Age: 74
End: 2024-10-18

## 2024-10-18 ENCOUNTER — OFFICE VISIT (OUTPATIENT)
Dept: MEDICAL GROUP | Facility: LAB | Age: 74
End: 2024-10-18
Payer: MEDICARE

## 2024-10-18 VITALS
TEMPERATURE: 98.1 F | RESPIRATION RATE: 14 BRPM | DIASTOLIC BLOOD PRESSURE: 64 MMHG | BODY MASS INDEX: 25.05 KG/M2 | WEIGHT: 127.6 LBS | OXYGEN SATURATION: 98 % | HEART RATE: 70 BPM | HEIGHT: 60 IN | SYSTOLIC BLOOD PRESSURE: 116 MMHG

## 2024-10-18 DIAGNOSIS — U07.1 COVID: ICD-10-CM

## 2024-10-18 DIAGNOSIS — R68.89 FLU-LIKE SYMPTOMS: ICD-10-CM

## 2024-10-18 LAB
FLUAV RNA SPEC QL NAA+PROBE: NEGATIVE
FLUBV RNA SPEC QL NAA+PROBE: NEGATIVE
RSV RNA SPEC QL NAA+PROBE: NEGATIVE
SARS-COV-2 RNA RESP QL NAA+PROBE: POSITIVE

## 2024-10-18 PROCEDURE — 0241U POCT CEPHEID COV-2, FLU A/B, RSV - PCR: CPT | Performed by: INTERNAL MEDICINE

## 2024-10-18 PROCEDURE — 3074F SYST BP LT 130 MM HG: CPT | Performed by: INTERNAL MEDICINE

## 2024-10-18 PROCEDURE — 3078F DIAST BP <80 MM HG: CPT | Performed by: INTERNAL MEDICINE

## 2024-10-18 PROCEDURE — 99213 OFFICE O/P EST LOW 20 MIN: CPT | Performed by: INTERNAL MEDICINE

## 2024-10-18 ASSESSMENT — FIBROSIS 4 INDEX: FIB4 SCORE: 1.91

## 2024-10-21 ENCOUNTER — OFFICE VISIT (OUTPATIENT)
Dept: MEDICAL GROUP | Age: 74
End: 2024-10-21
Payer: MEDICARE

## 2024-10-21 VITALS
DIASTOLIC BLOOD PRESSURE: 72 MMHG | SYSTOLIC BLOOD PRESSURE: 124 MMHG | OXYGEN SATURATION: 96 % | HEIGHT: 60 IN | WEIGHT: 126 LBS | HEART RATE: 77 BPM | BODY MASS INDEX: 24.74 KG/M2 | TEMPERATURE: 97.7 F

## 2024-10-21 DIAGNOSIS — U07.1 COVID: ICD-10-CM

## 2024-10-21 PROCEDURE — 3074F SYST BP LT 130 MM HG: CPT | Performed by: STUDENT IN AN ORGANIZED HEALTH CARE EDUCATION/TRAINING PROGRAM

## 2024-10-21 PROCEDURE — 3078F DIAST BP <80 MM HG: CPT | Performed by: STUDENT IN AN ORGANIZED HEALTH CARE EDUCATION/TRAINING PROGRAM

## 2024-10-21 PROCEDURE — 99212 OFFICE O/P EST SF 10 MIN: CPT | Performed by: STUDENT IN AN ORGANIZED HEALTH CARE EDUCATION/TRAINING PROGRAM

## 2024-10-21 ASSESSMENT — ENCOUNTER SYMPTOMS
NECK PAIN: 0
TINGLING: 0
DIARRHEA: 0
BLURRED VISION: 0
COUGH: 0
BACK PAIN: 0
BLOOD IN STOOL: 0
EYE PAIN: 0
DIZZINESS: 0
TREMORS: 0
DEPRESSION: 0
VOMITING: 0
SORE THROAT: 0
EYE DISCHARGE: 0
WEIGHT LOSS: 0
ORTHOPNEA: 0
SHORTNESS OF BREATH: 0
MYALGIAS: 1
HEADACHES: 0
FEVER: 0
CONSTIPATION: 0
CHILLS: 0
NAUSEA: 0
ABDOMINAL PAIN: 0
PALPITATIONS: 0
HEMOPTYSIS: 0

## 2024-10-21 ASSESSMENT — FIBROSIS 4 INDEX: FIB4 SCORE: 1.91

## 2024-10-28 ENCOUNTER — APPOINTMENT (OUTPATIENT)
Dept: MEDICAL GROUP | Age: 74
End: 2024-10-28
Payer: MEDICARE

## 2024-12-04 ENCOUNTER — HOSPITAL ENCOUNTER (OUTPATIENT)
Dept: RADIOLOGY | Facility: MEDICAL CENTER | Age: 74
End: 2024-12-04
Attending: OBSTETRICS & GYNECOLOGY
Payer: MEDICARE

## 2024-12-04 DIAGNOSIS — R10.2 PELVIC PAIN SYNDROME: ICD-10-CM

## 2024-12-04 PROCEDURE — 76830 TRANSVAGINAL US NON-OB: CPT

## 2024-12-05 ENCOUNTER — TELEPHONE (OUTPATIENT)
Dept: MEDICAL GROUP | Age: 74
End: 2024-12-05
Payer: MEDICARE

## 2024-12-05 DIAGNOSIS — J30.89 ENVIRONMENTAL AND SEASONAL ALLERGIES: ICD-10-CM

## 2024-12-06 NOTE — TELEPHONE ENCOUNTER
Patient called in and states she needs a referral to ENT please and thank you . I will pend the order

## 2025-01-29 ENCOUNTER — OFFICE VISIT (OUTPATIENT)
Dept: MEDICAL GROUP | Age: 75
End: 2025-01-29
Payer: MEDICARE

## 2025-01-29 VITALS
SYSTOLIC BLOOD PRESSURE: 130 MMHG | TEMPERATURE: 98.2 F | DIASTOLIC BLOOD PRESSURE: 72 MMHG | BODY MASS INDEX: 26.49 KG/M2 | HEART RATE: 69 BPM | WEIGHT: 126.2 LBS | OXYGEN SATURATION: 100 % | HEIGHT: 58 IN

## 2025-01-29 DIAGNOSIS — J06.9 UPPER RESPIRATORY TRACT INFECTION, UNSPECIFIED TYPE: ICD-10-CM

## 2025-01-29 RX ORDER — AZITHROMYCIN 500 MG/1
500 TABLET, FILM COATED ORAL DAILY
Qty: 3 TABLET | Refills: 0 | Status: SHIPPED | OUTPATIENT
Start: 2025-01-29

## 2025-01-29 ASSESSMENT — ENCOUNTER SYMPTOMS
HEMOPTYSIS: 0
VOMITING: 0
HEADACHES: 0
COUGH: 1
CONSTIPATION: 0
EYE DISCHARGE: 0
WHEEZING: 0
NECK PAIN: 0
ORTHOPNEA: 0
BLOOD IN STOOL: 0
PALPITATIONS: 0
MYALGIAS: 0
SORE THROAT: 1
TREMORS: 0
WEIGHT LOSS: 0
DEPRESSION: 0
DIARRHEA: 0
ABDOMINAL PAIN: 0
BLURRED VISION: 0
DIZZINESS: 0
BACK PAIN: 0
EYE PAIN: 0
FEVER: 0
CHILLS: 0
TINGLING: 0
NAUSEA: 0
SHORTNESS OF BREATH: 0

## 2025-01-29 ASSESSMENT — FIBROSIS 4 INDEX: FIB4 SCORE: 1.91

## 2025-01-30 NOTE — PROGRESS NOTES
Verbal consent was acquired by the patient to use [employer] ambient listening note generation during this visit     Subjective:     HPI:   History of Present Illness  The patient is a 74-year-old female who presents for evaluation of an upper respiratory infection (URI).    She reports a persistent, deep cough that is not constant throughout the day but occurs in episodes that are difficult to control. These coughing fits are so severe that they induce nausea and cause pain around her chest. She also experiences acid reflux during these episodes, although no actual regurgitation occurs. She has been experiencing these symptoms for the past 3 to 4 days. She does not report any fever but admits to occasional chills and nasal congestion with clear discharge. She has been using a saline nasal rinse for sinus relief. Additionally, she reports pain in her left ear and mild throat discomfort. She occasionally feels as though her ear is blocked, similar to the sensation experienced during air travel. She uses Q-tips for ear cleaning. She has both Mucinex and Robitussin at home.     MEDICATIONS  Current: Flonase, Robitussin, Mucinex, Tylenol    IMMUNIZATIONS  She received her influenza vaccine.    Health Maintenance: Completed    Review of Systems   Constitutional:  Negative for chills, fever, malaise/fatigue and weight loss.   HENT:  Positive for congestion and sore throat. Negative for ear pain and hearing loss.    Eyes:  Negative for blurred vision, pain and discharge.   Respiratory:  Positive for cough. Negative for hemoptysis, shortness of breath and wheezing.    Cardiovascular:  Negative for chest pain, palpitations and orthopnea.   Gastrointestinal:  Negative for abdominal pain, blood in stool, constipation, diarrhea, melena, nausea and vomiting.   Genitourinary:  Negative for dysuria, frequency, hematuria and urgency.   Musculoskeletal:  Negative for back pain, joint pain, myalgias and neck pain.   Skin:  Negative  "for rash.   Neurological:  Negative for dizziness, tingling, tremors and headaches.   Psychiatric/Behavioral:  Negative for depression and suicidal ideas.          Objective:     Exam:  /72 (BP Location: Left arm, Patient Position: Sitting, BP Cuff Size: Small adult)   Pulse 69   Temp 36.8 °C (98.2 °F) (Temporal)   Ht 1.461 m (4' 9.5\")   Wt 57.2 kg (126 lb 3.2 oz)   SpO2 100%   BMI 26.84 kg/m²  Body mass index is 26.84 kg/m².    Physical Exam  Constitutional:       General: She is not in acute distress.     Appearance: Normal appearance. She is not ill-appearing, toxic-appearing or diaphoretic.   HENT:      Head: Normocephalic and atraumatic.      Right Ear: Tympanic membrane and ear canal normal.      Left Ear: Tympanic membrane and ear canal normal.      Nose: Congestion and rhinorrhea present.      Mouth/Throat:      Mouth: Mucous membranes are moist.      Pharynx: No oropharyngeal exudate or posterior oropharyngeal erythema.   Eyes:      General: No scleral icterus.        Right eye: No discharge.         Left eye: No discharge.      Extraocular Movements: Extraocular movements intact.      Conjunctiva/sclera: Conjunctivae normal.      Pupils: Pupils are equal, round, and reactive to light.   Cardiovascular:      Rate and Rhythm: Normal rate and regular rhythm.      Heart sounds: Normal heart sounds.   Pulmonary:      Effort: Pulmonary effort is normal. No respiratory distress.      Breath sounds: Normal breath sounds. No wheezing.   Chest:      Chest wall: No tenderness.   Abdominal:      General: Abdomen is flat. There is no distension.      Palpations: Abdomen is soft.      Tenderness: There is no abdominal tenderness. There is no guarding.   Musculoskeletal:         General: No swelling or tenderness. Normal range of motion.      Cervical back: Normal range of motion and neck supple.      Right lower leg: No edema.      Left lower leg: No edema.   Skin:     General: Skin is warm and dry.      " Coloration: Skin is not jaundiced.   Neurological:      General: No focal deficit present.      Mental Status: She is alert and oriented to person, place, and time. Mental status is at baseline.      Motor: No weakness.      Coordination: Coordination normal.      Gait: Gait normal.   Psychiatric:         Mood and Affect: Mood normal.         Behavior: Behavior normal.         Thought Content: Thought content normal.         Judgment: Judgment normal.             Results      Assessment & Plan:     1. Upper respiratory tract infection, unspecified type  azithromycin (ZITHROMAX) 500 MG tablet          Assessment & Plan  1. Upper respiratory infection (URI).  Symptoms suggest a viral etiology, including a deep cough, postnasal drip, and clear nasal discharge. The cough may persist for up to a month. She reports pain in her left ear and mild throat discomfort. There is no infection, but increased pressure from coughing and fluid drainage from sinuses can lead to serous otitis media. She is advised to use Flonase nasal spray to reduce inflammation and facilitate sinus drainage. Over-the-counter medications such as Robitussin and Mucinex are recommended for cough management. A prescription for Z-Miko (azithromycin) has been provided, to be used only if symptoms worsen, such as the development of sinus pain or the production of purulent nasal discharge. She is also advised to receive the RSV vaccine at the pharmacy. A telemedicine appointment has been scheduled for next Thursday, which can be canceled if her condition improves.        Return in about 1 week (around 2/5/2025), or if symptoms worsen or fail to improve.    Please note that this dictation was created using voice recognition software. I have made every reasonable attempt to correct obvious errors, but I expect that there are errors of grammar and possibly content that I did not discover before finalizing the note.

## 2025-02-28 ENCOUNTER — HOSPITAL ENCOUNTER (OUTPATIENT)
Facility: MEDICAL CENTER | Age: 75
End: 2025-02-28
Attending: GENERAL PRACTICE
Payer: MEDICARE

## 2025-02-28 LAB
BASOPHILS # BLD AUTO: 0.5 % (ref 0–1.8)
BASOPHILS # BLD: 0.03 K/UL (ref 0–0.12)
CRP SERPL HS-MCNC: <0.3 MG/DL (ref 0–0.75)
EOSINOPHIL # BLD AUTO: 0.11 K/UL (ref 0–0.51)
EOSINOPHIL NFR BLD: 1.9 % (ref 0–6.9)
ERYTHROCYTE [DISTWIDTH] IN BLOOD BY AUTOMATED COUNT: 46 FL (ref 35.9–50)
ERYTHROCYTE [SEDIMENTATION RATE] IN BLOOD BY WESTERGREN METHOD: 17 MM/HOUR (ref 0–25)
HCT VFR BLD AUTO: 42.3 % (ref 37–47)
HGB BLD-MCNC: 13.8 G/DL (ref 12–16)
IMM GRANULOCYTES # BLD AUTO: 0.02 K/UL (ref 0–0.11)
IMM GRANULOCYTES NFR BLD AUTO: 0.4 % (ref 0–0.9)
LYMPHOCYTES # BLD AUTO: 1.46 K/UL (ref 1–4.8)
LYMPHOCYTES NFR BLD: 25.8 % (ref 22–41)
MCH RBC QN AUTO: 32.1 PG (ref 27–33)
MCHC RBC AUTO-ENTMCNC: 32.6 G/DL (ref 32.2–35.5)
MCV RBC AUTO: 98.4 FL (ref 81.4–97.8)
MONOCYTES # BLD AUTO: 0.52 K/UL (ref 0–0.85)
MONOCYTES NFR BLD AUTO: 9.2 % (ref 0–13.4)
NEUTROPHILS # BLD AUTO: 3.52 K/UL (ref 1.82–7.42)
NEUTROPHILS NFR BLD: 62.2 % (ref 44–72)
NRBC # BLD AUTO: 0 K/UL
NRBC BLD-RTO: 0 /100 WBC (ref 0–0.2)
PLATELET # BLD AUTO: 239 K/UL (ref 164–446)
PMV BLD AUTO: 11.4 FL (ref 9–12.9)
RBC # BLD AUTO: 4.3 M/UL (ref 4.2–5.4)
RHEUMATOID FACT SER IA-ACNC: <10 IU/ML (ref 0–14)
T3FREE SERPL-MCNC: 3.36 PG/ML (ref 2–4.4)
T4 SERPL-MCNC: 5.3 UG/DL (ref 4–12)
TSH SERPL DL<=0.005 MIU/L-ACNC: 4.29 UIU/ML (ref 0.38–5.33)
URATE SERPL-MCNC: 4.4 MG/DL (ref 1.9–8.2)
WBC # BLD AUTO: 5.7 K/UL (ref 4.8–10.8)

## 2025-02-28 PROCEDURE — 82671 ASSAY OF ESTROGENS: CPT

## 2025-02-28 PROCEDURE — 84436 ASSAY OF TOTAL THYROXINE: CPT

## 2025-02-28 PROCEDURE — 86431 RHEUMATOID FACTOR QUANT: CPT

## 2025-02-28 PROCEDURE — 84443 ASSAY THYROID STIM HORMONE: CPT

## 2025-02-28 PROCEDURE — 36415 COLL VENOUS BLD VENIPUNCTURE: CPT

## 2025-02-28 PROCEDURE — 86038 ANTINUCLEAR ANTIBODIES: CPT

## 2025-02-28 PROCEDURE — 86140 C-REACTIVE PROTEIN: CPT

## 2025-02-28 PROCEDURE — 85652 RBC SED RATE AUTOMATED: CPT

## 2025-02-28 PROCEDURE — 84481 FREE ASSAY (FT-3): CPT

## 2025-02-28 PROCEDURE — 84439 ASSAY OF FREE THYROXINE: CPT

## 2025-02-28 PROCEDURE — 84550 ASSAY OF BLOOD/URIC ACID: CPT

## 2025-02-28 PROCEDURE — 85025 COMPLETE CBC W/AUTO DIFF WBC: CPT

## 2025-03-01 LAB — NUCLEAR IGG SER QL IA: NORMAL

## 2025-03-03 LAB — T4 FREE SERPL-MCNC: NORMAL NG/DL (ref 0.93–1.7)

## 2025-03-04 LAB
ESTRADIOL SERPL HS-MCNC: 6.2 PG/ML
ESTROGEN SERPL CALC-MCNC: 30.1 PG/ML
ESTRONE SERPL-MCNC: 23.9 PG/ML

## 2025-03-06 LAB — T4 FREE SERPL DIALY-MCNC: 1.3 NG/DL (ref 1.1–2.4)

## 2025-03-14 ENCOUNTER — OFFICE VISIT (OUTPATIENT)
Dept: DERMATOLOGY | Facility: IMAGING CENTER | Age: 75
End: 2025-03-14
Payer: MEDICARE

## 2025-03-14 DIAGNOSIS — D18.01 CHERRY ANGIOMA: ICD-10-CM

## 2025-03-14 DIAGNOSIS — Z12.83 SKIN CANCER SCREENING: ICD-10-CM

## 2025-03-14 DIAGNOSIS — L29.9 ITCHING: ICD-10-CM

## 2025-03-14 DIAGNOSIS — L82.1 SEBORRHEIC KERATOSIS: ICD-10-CM

## 2025-03-14 DIAGNOSIS — D22.9 NEVUS: ICD-10-CM

## 2025-03-14 DIAGNOSIS — L81.4 LENTIGO: ICD-10-CM

## 2025-03-14 DIAGNOSIS — L90.8 SKIN AGING: ICD-10-CM

## 2025-03-14 PROCEDURE — 99203 OFFICE O/P NEW LOW 30 MIN: CPT | Performed by: DERMATOLOGY

## 2025-03-14 NOTE — PROGRESS NOTES
CC: XAVI    Subjective:New patient here for XAVI  Denies new, growing, changing, itching or bleeding skin lesions today.     Itching of back.     History of skin cancer: No  History of precancers/actinic keratoses: No  History of biopsies:No  History of blistering/severe sunburns:No  Family history of skin cancer:No  Family history of atypical moles:No    ROS: no fevers/chills. No itch.  No cough  Relevant PMH: hypothyroidism  Social: NS, avoids/limits sun    PE: Gen:WDWN female in NAD. Skin: Scalp/face/eyes/lips/neck/chest/back/arms/legs/hands/feet/buttocks - without suspicious lesions noted.  Genitals exam declined. Declined exam of lower chest/breast and abdomen  -scattered hyperpigmented macules/papules appearing on torso/extremities, appearing benign without suspicious features  -cherry red macules / papules on torso>extremities  -few excoriated papules on back    A/P: Benign appearing skin lesions: nevi/lentigos/SK/cherry angioma:  -reviewed sunprotection/detection  -ABCDEs reviewed   -f/u PRN growth/changes/suspicious features    Itching: advised use of moisturizer for dryness  -f/u PRN    I have reviewed medications relevant to my specialty.

## 2025-03-21 DIAGNOSIS — F41.9 ANXIETY: ICD-10-CM

## 2025-03-21 DIAGNOSIS — F51.01 PRIMARY INSOMNIA: ICD-10-CM

## 2025-03-24 RX ORDER — ALPRAZOLAM 0.5 MG
TABLET ORAL
Qty: 90 TABLET | Refills: 0 | Status: SHIPPED
Start: 2025-03-24 | End: 2025-03-31

## 2025-03-31 ENCOUNTER — OFFICE VISIT (OUTPATIENT)
Dept: MEDICAL GROUP | Facility: LAB | Age: 75
End: 2025-03-31
Payer: MEDICARE

## 2025-03-31 VITALS
HEIGHT: 58 IN | HEART RATE: 72 BPM | BODY MASS INDEX: 26.03 KG/M2 | WEIGHT: 124 LBS | RESPIRATION RATE: 16 BRPM | SYSTOLIC BLOOD PRESSURE: 118 MMHG | DIASTOLIC BLOOD PRESSURE: 68 MMHG | TEMPERATURE: 97.4 F | OXYGEN SATURATION: 98 %

## 2025-03-31 DIAGNOSIS — M81.0 AGE-RELATED OSTEOPOROSIS WITHOUT CURRENT PATHOLOGICAL FRACTURE: ICD-10-CM

## 2025-03-31 DIAGNOSIS — E78.5 DYSLIPIDEMIA: ICD-10-CM

## 2025-03-31 DIAGNOSIS — M50.30 DDD (DEGENERATIVE DISC DISEASE), CERVICAL: ICD-10-CM

## 2025-03-31 DIAGNOSIS — I25.10 CORONARY ARTERY DISEASE DUE TO LIPID RICH PLAQUE: ICD-10-CM

## 2025-03-31 DIAGNOSIS — R93.1 ABNORMAL SCREENING CARDIAC CT: ICD-10-CM

## 2025-03-31 DIAGNOSIS — Z78.0 ASYMPTOMATIC MENOPAUSE: ICD-10-CM

## 2025-03-31 DIAGNOSIS — R73.01 IFG (IMPAIRED FASTING GLUCOSE): ICD-10-CM

## 2025-03-31 DIAGNOSIS — G89.29 CHRONIC BILATERAL LOW BACK PAIN WITHOUT SCIATICA: ICD-10-CM

## 2025-03-31 DIAGNOSIS — I25.83 CORONARY ARTERY DISEASE DUE TO LIPID RICH PLAQUE: ICD-10-CM

## 2025-03-31 DIAGNOSIS — M54.50 CHRONIC BILATERAL LOW BACK PAIN WITHOUT SCIATICA: ICD-10-CM

## 2025-03-31 DIAGNOSIS — E06.3 HYPOTHYROIDISM, ACQUIRED, AUTOIMMUNE: ICD-10-CM

## 2025-03-31 RX ORDER — IRON HEME POLYPEPTIDE/FOLIC AC 12-1MG
5000 TABLET ORAL DAILY
Qty: 30 CAPSULE | Refills: 4
Start: 2025-03-31

## 2025-03-31 ASSESSMENT — PATIENT HEALTH QUESTIONNAIRE - PHQ9: CLINICAL INTERPRETATION OF PHQ2 SCORE: 0

## 2025-03-31 ASSESSMENT — FIBROSIS 4 INDEX: FIB4 SCORE: 1.67

## 2025-03-31 NOTE — PROGRESS NOTES
Verbal consent was acquired by the patient to use [employer] ambient listening note generation during this visit Yes      Subjective   Yaz Farooq is a 74 y.o. female who presents for est care. Previous pt of Dr Meza and then Jose Alberto.  She is also established with Dr. Quintero, GI and Dr. Jordan, cardiology.  History of Present Illness  The patient is a 74-year-old female who presents as a new patient to our office.    She has been residing in [place] since 1979 and has been under the care of a few different primary care physicians for the past few years.      She had an MRI of her cervical spine done a few months ago which showed multilevel degenerative disc disease.  She is entering physical therapy and looking forward to that.  She experiences pain originating from the base of her skull, intermittent tingling in her left shoulder, and occasional arm issues. She also reports chronic low back pain that does not radiate down her legs.  She maintains an active lifestyle, attending the gym every other day, participating in yoga classes, using weights, and walking outdoors for 2 to 2.5 miles several times a week. She also volunteers.  She is not currently seeing a physiatrist and has not seen a neurosurgeon.   She takes Tylenol 500 mg for pain and has been taking turmeric for years.    She has a history of acid reflux and underwent endoscopy and colonoscopy last year, both of which were normal. She reports a slight upset stomach and was previously on pantoprazole 60 mg daily, which was discontinued due to side effects. She now takes Pepcid as needed.    She experiences bloating and increased gas production. She takes Benefiber and consumes beans, nuts, dairy, and broccoli.    She had a mammogram in 02/2024 and plans to schedule another one ASAP.  She had a Pap smear last week, which was negative -Dr. Moulton. She had a bone density test in 2022 showing osteoporosis but does not recall any discussions about that her  "treatment.  As above she does perform regular weightbearing exercise and takes calcium/vitamin D.    Hypothyroidism: Chronic issue.  She takes [place] for thyroid daily     Supplements:  vitamin D, B complex 3 times a week, calcium, zinc, and omega-3.     Coronary artery disease: CT cardiac score was a little bit over 200.  She is seeing cardiology and was told that she does not need a statin until its over 400.  She is awaiting a few more heart test.  She is not actively having chest pain.    SOCIAL HISTORY  She does not smoke cigarettes. She drinks a couple of glasses of wine a week.    FAMILY HISTORY  Her mother and father had heart issues and passed away. Her sister passed away from breast cancer a couple of years ago. One brother  from heart issues, another brother had memory issues and passed away, and two brothers are still alive. The oldest brother is in his 90s and has memory issues.    ALLERGIES  She is allergic to AUGMENTIN and COMPAZINE      Objective   /68 (BP Location: Left arm, Patient Position: Sitting, BP Cuff Size: Adult)   Pulse 72   Temp 36.3 °C (97.4 °F)   Resp 16   Ht 1.461 m (4' 9.5\")   Wt 56.2 kg (124 lb)   SpO2 98%   Physical Exam  Gen. appears healthy in no distress   Skin appropriate for sex and age   Neck trachea is midline  Lungs unlabored breathing  Heart regular rate  Neuro gait and station normal   Psych appropriate, calm, interactive, well-groomed    Results  Reviewed all labs done in 2025 with her in depth.    Imaging  MRI of neck shows degenerative disc disease and significant arthritis.       Assessment & Plan  \"  1. Abnormal screening cardiac CT - told no statin needed by Dr Jordan as long as  under 400        2. Coronary artery disease due to lipid rich plaque        3. Dyslipidemia        4. Hypothyroidism, acquired, autoimmune        5. IFG (impaired fasting glucose)        6. Asymptomatic menopause  DS-BONE DENSITY STUDY (DEXA)      7. Age-related " osteoporosis without current pathological fracture        8. DDD (degenerative disc disease), cervical        9. Chronic bilateral low back pain without sciatica        Encouraged her to add in a bone density with her mammogram that she plans on scheduling tomorrow.  Reviewed her MRI of her cervical spine with her and scanned into media.  She is starting physical therapy and will let me know how this is going.  Discussed potential referrals to physiatry and then neurosurgery if needed depending on her response to physical therapy.  Encouraged her to continue her daily exercise program as well.  She is currently followed and going through a further workup with cardiology.  Thyroid labs are up-to-date and within normal limits, recheck 6 months.  Discussed her last bone density and we will compare her most recent to 2022, then discussed potential need for oral bisphosphonate therapy.  We discussed Gas-X for bloating.  She can also try a FODMAP diet.  She is up-to-date with GI in regards to colonoscopy and endoscopy.    I will see her back here in about 6 weeks so we can follow-up on this visit and any further questions that she may have as we ran out of time after about 45 minutes.  Fortunately when I see her back she will have completed further cardiac tests and have been in physical therapy for several weeks.     Total face to face time 40 minutes of which over 50% of this visit is spent in counseling, education and outlining a plan of treatment and coordination of care for the above conditions. This included but was not limited to discussion of medication options and potential risks related to the medications, referral and specialty care options. All patient questions were answered              Please note that this dictation was created using voice recognition software. I have made every reasonable attempt to correct obvious errors, but I expect that there are errors of grammar and possibly content that I did not  discover before finalizing the note.

## 2025-04-23 ENCOUNTER — HOSPITAL ENCOUNTER (OUTPATIENT)
Dept: RADIOLOGY | Facility: MEDICAL CENTER | Age: 75
End: 2025-04-23
Attending: NURSE PRACTITIONER
Payer: MEDICARE

## 2025-04-23 ENCOUNTER — HOSPITAL ENCOUNTER (OUTPATIENT)
Dept: RADIOLOGY | Facility: MEDICAL CENTER | Age: 75
End: 2025-04-23
Attending: OBSTETRICS & GYNECOLOGY
Payer: MEDICARE

## 2025-04-23 DIAGNOSIS — R92.321 MAMMOGRAPHIC FIBROGLANDULAR DENSITY, RIGHT BREAST: ICD-10-CM

## 2025-04-23 DIAGNOSIS — Z12.31 VISIT FOR SCREENING MAMMOGRAM: ICD-10-CM

## 2025-04-23 DIAGNOSIS — Z78.0 ASYMPTOMATIC MENOPAUSE: ICD-10-CM

## 2025-04-23 PROCEDURE — 77080 DXA BONE DENSITY AXIAL: CPT

## 2025-04-23 PROCEDURE — 77067 SCR MAMMO BI INCL CAD: CPT

## 2025-04-24 ENCOUNTER — RESULTS FOLLOW-UP (OUTPATIENT)
Dept: MEDICAL GROUP | Facility: LAB | Age: 75
End: 2025-04-24

## 2025-04-28 ENCOUNTER — HOSPITAL ENCOUNTER (OUTPATIENT)
Facility: MEDICAL CENTER | Age: 75
End: 2025-04-28
Attending: INTERNAL MEDICINE
Payer: MEDICARE

## 2025-04-28 PROCEDURE — 83704 LIPOPROTEIN BLD QUAN PART: CPT

## 2025-04-28 PROCEDURE — 82172 ASSAY OF APOLIPOPROTEIN: CPT

## 2025-04-28 PROCEDURE — 36415 COLL VENOUS BLD VENIPUNCTURE: CPT

## 2025-04-28 PROCEDURE — 80061 LIPID PANEL: CPT

## 2025-04-29 LAB
APO A-I SERPL-MCNC: 204 MG/DL (ref 108–225)
APO B100 SERPL-MCNC: 83 MG/DL (ref 60–117)

## 2025-04-30 ENCOUNTER — HOSPITAL ENCOUNTER (OUTPATIENT)
Dept: RADIOLOGY | Facility: MEDICAL CENTER | Age: 75
End: 2025-04-30
Attending: OBSTETRICS & GYNECOLOGY
Payer: MEDICARE

## 2025-04-30 DIAGNOSIS — R92.321 MAMMOGRAPHIC FIBROGLANDULAR DENSITY, RIGHT BREAST: ICD-10-CM

## 2025-04-30 DIAGNOSIS — Z12.31 VISIT FOR SCREENING MAMMOGRAM: ICD-10-CM

## 2025-04-30 PROCEDURE — 76641 ULTRASOUND BREAST COMPLETE: CPT

## 2025-05-01 LAB
CHOLEST SERPL-MCNC: 207 MG/DL
HDL PARTICAL NO Q4363: >41 UMOL/L
HDL SIZE Q4361: 9.3 NM
HDLC SERPL-MCNC: 81 MG/DL (ref 40–59)
HLD.LARGE SERPL-SCNC: 10.9 UMOL/L
L VLDL PART NO Q4357: <1.5 NMOL/L
LDL SERPL QN: 21 NM
LDL SERPL-SCNC: 1143 NMOL/L
LDL SMALL SERPL-SCNC: 328 NMOL/L
LDLC SERPL CALC-MCNC: 97 MG/DL
PATHOLOGY STUDY: ABNORMAL
TRIGL SERPL-MCNC: 143 MG/DL (ref 30–149)
VLDL SIZE Q4362: 45.1 NM

## 2025-05-12 ENCOUNTER — OFFICE VISIT (OUTPATIENT)
Dept: MEDICAL GROUP | Facility: LAB | Age: 75
End: 2025-05-12
Payer: MEDICARE

## 2025-05-12 VITALS
HEIGHT: 58 IN | DIASTOLIC BLOOD PRESSURE: 60 MMHG | SYSTOLIC BLOOD PRESSURE: 120 MMHG | HEART RATE: 84 BPM | WEIGHT: 123 LBS | RESPIRATION RATE: 16 BRPM | BODY MASS INDEX: 25.82 KG/M2 | OXYGEN SATURATION: 96 % | TEMPERATURE: 97.3 F

## 2025-05-12 DIAGNOSIS — R93.1 ABNORMAL SCREENING CARDIAC CT: ICD-10-CM

## 2025-05-12 DIAGNOSIS — E06.3 HYPOTHYROIDISM, ACQUIRED, AUTOIMMUNE: ICD-10-CM

## 2025-05-12 DIAGNOSIS — R07.89 MID STERNAL CHEST PAIN: ICD-10-CM

## 2025-05-12 DIAGNOSIS — M81.0 AGE-RELATED OSTEOPOROSIS WITHOUT CURRENT PATHOLOGICAL FRACTURE: ICD-10-CM

## 2025-05-12 DIAGNOSIS — R13.10 DYSPHAGIA, UNSPECIFIED TYPE: ICD-10-CM

## 2025-05-12 PROCEDURE — 3074F SYST BP LT 130 MM HG: CPT | Performed by: NURSE PRACTITIONER

## 2025-05-12 PROCEDURE — 99214 OFFICE O/P EST MOD 30 MIN: CPT | Performed by: NURSE PRACTITIONER

## 2025-05-12 PROCEDURE — 3078F DIAST BP <80 MM HG: CPT | Performed by: NURSE PRACTITIONER

## 2025-05-12 ASSESSMENT — FIBROSIS 4 INDEX: FIB4 SCORE: 1.67

## 2025-05-12 NOTE — PROGRESS NOTES
"Chief Complaint   Patient presents with    Results     Bone density          HPI:  History of Present Illness  Yaz is a 73 yo est female here with a few issues:     Cardiology concerned about feeling a nodule to thyroid area - US was ordered through [facility].    Tsh 2/2025 - 4.2; T4 total 5.3 on 75 mg [place].  Had GI upset with Synthroid.    Cervical degenerative disc disease: Chronic issue.  Also seeing Dr Harvey - neurosurgery - told that she doesn't need surgery, just \"aging.\"  In physical therapy and discussed shots as needed.     Having heart burn and taking pepcid which has helped.  Waiting on nuclear med cardiac testing.  Has pain in mid chest  - pointing to epigastric area and up to sternum.  Use to take omeprazole and was told to downgrade by GI.  Endoscopy normal last year.  Denies black or bloody stools.  Would like to have a barium swallow.      Exam:   /60 (BP Location: Left arm, Patient Position: Sitting, BP Cuff Size: Adult)   Pulse 84   Temp 36.3 °C (97.3 °F)   Resp 16   Ht 1.461 m (4' 9.5\")   Wt 55.8 kg (123 lb)   SpO2 96%   BMI 26.16 kg/m²   Gen. appears healthy in no distress   Skin appropriate for sex and age   Neck trachea is midline  Lungs unlabored breathing  Heart regular rate  Neuro gait and station normal   Psych appropriate, calm, interactive, well-groomed        Assessment / Plan:  Assessment & Plan  \"  1. Dysphagia, unspecified type  DX-ESOPHAGUS BARIUM SWALLOW SINGLE CONTRAST      2. Mid sternal chest pain  DX-ESOPHAGUS BARIUM SWALLOW SINGLE CONTRAST      3. Hypothyroidism, acquired, autoimmune  TSH    FREE THYROXINE      4. Abnormal screening cardiac CT - told no statin needed by Dr Jordan as long as  under 400        5. Age-related osteoporosis without current pathological fracture        \"  Ordered a barium swallow.  May upgrade to Prilosec for 2 weeks and notify me if this is more helpful than Pepcid.  Her gastroenterologist is Dr. Quintero.  Encouraged avoidance of " spicy/fried foods, large meals and lying down after eating.  Recommend updated TSH with T4 when she does labs for her cardiologist in a few months.  Awaiting thyroid ultrasound through [facility] and she will notify me when this is complete, that was ordered by her cardiologist.  Awaiting nuclear med stress test ordered by her cardiologist.  Reviewed her last bone density showing osteoporosis although slight improvement.  She prefers to stay off of medication for osteoporosis.  Discussed calcium, vitamin D and weightbearing exercise.  Follow-up 3 months.

## 2025-05-20 ENCOUNTER — TELEPHONE (OUTPATIENT)
Dept: MEDICAL GROUP | Facility: LAB | Age: 75
End: 2025-05-20
Payer: MEDICARE

## 2025-05-20 NOTE — TELEPHONE ENCOUNTER
(LVM) Can not get in for barium swallow at [facility] until July. They suggested [facility]/ I called patient and left message that I would fax over to [facility]. Also inquired about thyroid US results/ is this supposed to be told to her by the provider that ordered it or is she wanting us to request it?

## 2025-05-20 NOTE — LETTER
[facility]  Misti Gamble, A.P.N.  [address]  [place] [address]  Fax: [phone]   Authorization for Release/Disclosure of   Protected Health Information   Name: MEÑO QUILES : 1950 SSN: [id number]   Address: [address] [place] [address] [place] [address] Phone:    [phone] (home)    I authorize the entity listed below to release/disclose the PHI below to:   [facility]/Misti Gamble, A.P.N. and Misti Gamble A.P.N.   Provider or Entity Name:  [facility]   Address   City, State, Zip   Phone:      Fax:[phone]     Reason for request: continuity of care   Information to be released:    [  ] LAST COLONOSCOPY,  including any PATH REPORT and follow-up  [  ] LAST FIT/COLOGUARD RESULT [  ] LAST DEXA  [  ] LAST MAMMOGRAM  [  ] LAST PAP  [  ] LAST LABS [  ] RETINA EXAM REPORT  [  ] IMMUNIZATION RECORDS  [ XX ] Release thyroid US      [  ] Check here and initial the line next to each item to release ALL health information INCLUDING  _____ Care and treatment for drug and / or alcohol abuse  _____ HIV testing, infection status, or AIDS  _____ Genetic Testing    DATES OF SERVICE OR TIME PERIOD TO BE DISCLOSED: _____________  I understand and acknowledge that:  * This Authorization may be revoked at any time by you in writing, except if your health information has already been used or disclosed.  * Your health information that will be used or disclosed as a result of you signing this authorization could be re-disclosed by the recipient. If this occurs, your re-disclosed health information may no longer be protected by State or Federal laws.  * You may refuse to sign this Authorization. Your refusal will not affect your ability to obtain treatment.  * This Authorization becomes effective upon signing and will  on (date) __________.      If no date is indicated, this Authorization will  one (1) year from the signature date.    Name: Meño Quiles  Signature: Date:    5/21/2025     PLEASE FAX REQUESTED RECORDS BACK TO: [phone]

## 2025-05-21 NOTE — TELEPHONE ENCOUNTER
If we can request the thyroid ultrasound result from [facility], that would be great.  Thank you

## 2025-06-02 ENCOUNTER — TELEPHONE (OUTPATIENT)
Dept: MEDICAL GROUP | Facility: LAB | Age: 75
End: 2025-06-02
Payer: MEDICARE

## 2025-06-02 NOTE — TELEPHONE ENCOUNTER
Patient called and LVM stating that the change from Pepcid to Prilosec is much better / also thyroid US has not heard from ordering provider but I guess I requested it and the results are in media

## 2025-06-03 NOTE — TELEPHONE ENCOUNTER
Looks like there is a nodule on the right thyroid gland which is very common.  Recommendation from radiology was to recheck at 1 year and to have thyroid labs drawn which I have ordered.

## 2025-06-04 ENCOUNTER — RESULTS FOLLOW-UP (OUTPATIENT)
Dept: MEDICAL GROUP | Facility: LAB | Age: 75
End: 2025-06-04

## 2025-06-04 ENCOUNTER — HOSPITAL ENCOUNTER (OUTPATIENT)
Facility: MEDICAL CENTER | Age: 75
End: 2025-06-04
Attending: NURSE PRACTITIONER
Payer: MEDICARE

## 2025-06-04 DIAGNOSIS — E06.3 HYPOTHYROIDISM, ACQUIRED, AUTOIMMUNE: ICD-10-CM

## 2025-06-04 DIAGNOSIS — E06.3 HYPOTHYROIDISM, ACQUIRED, AUTOIMMUNE: Primary | ICD-10-CM

## 2025-06-04 LAB
T4 FREE SERPL-MCNC: 0.61 NG/DL (ref 0.93–1.7)
TSH SERPL DL<=0.005 MIU/L-ACNC: 10.7 UIU/ML (ref 0.38–5.33)

## 2025-06-04 PROCEDURE — 84439 ASSAY OF FREE THYROXINE: CPT

## 2025-06-04 PROCEDURE — 36415 COLL VENOUS BLD VENIPUNCTURE: CPT

## 2025-06-04 PROCEDURE — 84443 ASSAY THYROID STIM HORMONE: CPT

## 2025-06-04 RX ORDER — THYROID 15 MG/1
TABLET ORAL
Qty: 100 TABLET | Refills: 3
Start: 2025-06-04 | End: 2025-06-12 | Stop reason: SDUPTHER

## 2025-06-12 ENCOUNTER — TELEPHONE (OUTPATIENT)
Dept: MEDICAL GROUP | Facility: LAB | Age: 75
End: 2025-06-12
Payer: MEDICARE

## 2025-06-12 DIAGNOSIS — E06.3 HYPOTHYROIDISM, ACQUIRED, AUTOIMMUNE: ICD-10-CM

## 2025-06-12 RX ORDER — THYROID 15 MG/1
TABLET ORAL
Qty: 100 TABLET | Refills: 3 | Status: SHIPPED | OUTPATIENT
Start: 2025-06-12

## 2025-06-12 NOTE — TELEPHONE ENCOUNTER
She should do this with her cardiologist lab work in August.  Too early to do it now.  Yes, may take up to 6-8 weeks for effects of thyroid medicine to kick in.

## 2025-06-12 NOTE — TELEPHONE ENCOUNTER
Patient called and LVM. Says that you gave her labwork to do and said to do this with Dr Jordan/s labwork. She is not to do Dr Jordan's until August. Did you want her to do your labs sooner or wait until August? Also is very tired and this is not normal for her. Wants to know if this is because the thyroid is off and will this stabilize eventually once she takes the medication for a while?

## 2025-06-23 DIAGNOSIS — R13.10 DYSPHAGIA, UNSPECIFIED TYPE: ICD-10-CM

## 2025-06-23 DIAGNOSIS — R07.89 MID STERNAL CHEST PAIN: ICD-10-CM

## 2025-06-25 ENCOUNTER — HOSPITAL ENCOUNTER (OUTPATIENT)
Dept: RADIOLOGY | Facility: MEDICAL CENTER | Age: 75
End: 2025-06-25
Attending: INTERNAL MEDICINE
Payer: MEDICARE

## 2025-06-25 DIAGNOSIS — E03.9 PRIMARY HYPOTHYROIDISM: ICD-10-CM

## 2025-06-25 DIAGNOSIS — I25.10 DISEASE OF CARDIOVASCULAR SYSTEM: ICD-10-CM

## 2025-06-25 PROCEDURE — 93018 CV STRESS TEST I&R ONLY: CPT | Performed by: INTERNAL MEDICINE

## 2025-06-25 PROCEDURE — A9502 TC99M TETROFOSMIN: HCPCS

## 2025-06-25 PROCEDURE — 78452 HT MUSCLE IMAGE SPECT MULT: CPT | Mod: 26 | Performed by: INTERNAL MEDICINE

## 2025-07-14 ENCOUNTER — OFFICE VISIT (OUTPATIENT)
Dept: MEDICAL GROUP | Facility: LAB | Age: 75
End: 2025-07-14
Payer: MEDICARE

## 2025-07-14 VITALS
BODY MASS INDEX: 25.61 KG/M2 | TEMPERATURE: 97 F | HEIGHT: 58 IN | HEART RATE: 86 BPM | RESPIRATION RATE: 16 BRPM | DIASTOLIC BLOOD PRESSURE: 60 MMHG | OXYGEN SATURATION: 97 % | SYSTOLIC BLOOD PRESSURE: 126 MMHG | WEIGHT: 122 LBS

## 2025-07-14 DIAGNOSIS — R93.1 ABNORMAL SCREENING CARDIAC CT: ICD-10-CM

## 2025-07-14 DIAGNOSIS — E06.3 HYPOTHYROIDISM, ACQUIRED, AUTOIMMUNE: ICD-10-CM

## 2025-07-14 DIAGNOSIS — Z79.890 HORMONE REPLACEMENT THERAPY (HRT): ICD-10-CM

## 2025-07-14 DIAGNOSIS — M48.061 SPINAL STENOSIS OF LUMBAR REGION WITHOUT NEUROGENIC CLAUDICATION: ICD-10-CM

## 2025-07-14 DIAGNOSIS — E04.1 THYROID NODULE: ICD-10-CM

## 2025-07-14 DIAGNOSIS — K21.00 GASTROESOPHAGEAL REFLUX DISEASE WITH ESOPHAGITIS, UNSPECIFIED WHETHER HEMORRHAGE: ICD-10-CM

## 2025-07-14 PROCEDURE — 3078F DIAST BP <80 MM HG: CPT | Performed by: NURSE PRACTITIONER

## 2025-07-14 PROCEDURE — 99214 OFFICE O/P EST MOD 30 MIN: CPT | Performed by: NURSE PRACTITIONER

## 2025-07-14 PROCEDURE — 3074F SYST BP LT 130 MM HG: CPT | Performed by: NURSE PRACTITIONER

## 2025-07-14 ASSESSMENT — FIBROSIS 4 INDEX: FIB4 SCORE: 1.7

## 2025-07-14 NOTE — PROGRESS NOTES
Verbal consent was acquired by the patient to use [employer] ambient listening note generation during this visit Yes      Subjective   Yaz Farooq is a 75 y.o. female who presents for f/u   History of Present Illness  The patient is a 75-year-old female who presents for review of multiple tests.    Spinal stenosis:  followed now by Dr Oh, neurosurgery.  She has been prescribed opioids for her back pain but has not yet started the medication due to concerns about its potency. She experiences pain, which is not severe, and has been informed that her MRI results are concerning. She has been advised to consider physical therapy and pain management, with an appointment scheduled for 09/2025 with Dr. Rahman. She recalls a previous epidural injection over a decade ago that resulted in severe back pain for 8 months. She is currently under the care of R[facility] at [facility], who has suggested trying a different treatment approach this time -physical therapy and would like a renewed order for PT. She reports no radiating leg pain but does experience discomfort when standing. She engages in regular exercise, including bending down exercises, weightlifting (up to 50 pounds), and walking a couple of miles each morning. She also uses an elliptical machine at the gym for about 10 minutes.      She has undergone a barium swallow test for digestive issues which showed GERD.      Coronary artery disease: Chronic issue.  Also followed by cardiology, Dr. Jordan.  Had a stress test and does not meet with cardiology for several weeks, would like to know the results.    Hypothyroidism: Also has a known thyroid nodule.  She is currently on [place] Thyroid for her thyroid condition.  TSH / T4 abnormal on recent lab work and [place] Thyroid was increased from 75 mg to 90 mg 5 days a week, continuing 75 mg 2 days/week.    Vaginal atrophy: She is currently using estrogen vaginal cream through gyn.       Objective   /60 (BP  "Location: Right arm, Patient Position: Sitting, BP Cuff Size: Adult)   Pulse 86   Temp 36.1 °C (97 °F)   Resp 16   Ht 1.461 m (4' 9.5\")   Wt 55.3 kg (122 lb)   SpO2 97%   Physical Exam    Gen: NAD  Resp: nonlabored.  Able to speak in full sentences  Psy: pleasant / cooperative.   Neuro:  Alert and oriented x 3    Assessment & Plan  1. Spinal stenosis  MRI reveals severe spinal stenosis with bulging disks at nearly every level, causing nerve compression.  Diagnostic plan: Referral to [facility] for physical therapy. Consultation with Dr. Rahman for pain management advised.  Treatment plan: Physical therapy recommended to strengthen the trunk and delay the need for surgery.  Reviewed recent notes from neurosurgery and scanned in media.    2. Acid reflux  Barium swallow test indicates acid reflux, but esophagus appears healthy with no growths or narrowing.  Treatment plan: Advised to continue taking Prilosec.    3. Thyroid nodules  Thyroid ultrasound recommended recheck 1 year, reviewed with patient.  Will recheck thyroid ultrasound May 2026.  Treatment plan: Continue current regimen of [place] Thyroid 60 mg plus 30 mg for 5 days and 60 mg plus 15 mg for 2 days.  Recheck thyroid labs next month.  Clinical decision making: Reassured that the risk of thyroid cancer is low, with approximately 95% of thyroid nodules being non-cancerous.    4. Hormone replacement therapy -topically only and prescribed by her gynecologist.      5.  Coronary artery disease:  Negative stress test.  Will keep her appointment with cardiology for next month.    Follow-up: A follow-up appointment is scheduled for 3 months from now.         [address] multiple cardiac /endocrine/gynecological/GI problems including reviewing multiple test results, specialty consult notes requiring detailed assessment, management, review of diagnostic data, ordering tests, monitoring high risk medication, communicating with the patient.         Please note that " this dictation was created using voice recognition software. I have made every reasonable attempt to correct obvious errors, but I expect that there are errors of grammar and possibly content that I did not discover before finalizing the note.

## 2025-08-14 ENCOUNTER — HOSPITAL ENCOUNTER (OUTPATIENT)
Facility: MEDICAL CENTER | Age: 75
End: 2025-08-14
Attending: NURSE PRACTITIONER
Payer: MEDICARE

## 2025-08-14 ENCOUNTER — RESULTS FOLLOW-UP (OUTPATIENT)
Dept: MEDICAL GROUP | Facility: LAB | Age: 75
End: 2025-08-14
Payer: MEDICARE

## 2025-08-14 DIAGNOSIS — E06.3 HYPOTHYROIDISM, ACQUIRED, AUTOIMMUNE: Primary | ICD-10-CM

## 2025-08-14 DIAGNOSIS — E06.3 HYPOTHYROIDISM, ACQUIRED, AUTOIMMUNE: ICD-10-CM

## 2025-08-14 LAB
T3 SERPL-MCNC: 174 NG/DL (ref 60–181)
T4 FREE SERPL-MCNC: 0.8 NG/DL (ref 0.93–1.7)
TSH SERPL DL<=0.005 MIU/L-ACNC: 2.12 UIU/ML (ref 0.38–5.33)

## 2025-08-14 PROCEDURE — 86141 C-REACTIVE PROTEIN HS: CPT

## 2025-08-14 PROCEDURE — 83695 ASSAY OF LIPOPROTEIN(A): CPT

## 2025-08-14 PROCEDURE — 84480 ASSAY TRIIODOTHYRONINE (T3): CPT

## 2025-08-14 PROCEDURE — 83704 LIPOPROTEIN BLD QUAN PART: CPT

## 2025-08-14 PROCEDURE — 84478 ASSAY OF TRIGLYCERIDES: CPT

## 2025-08-14 PROCEDURE — 83718 ASSAY OF LIPOPROTEIN: CPT

## 2025-08-14 PROCEDURE — 36415 COLL VENOUS BLD VENIPUNCTURE: CPT

## 2025-08-14 PROCEDURE — 84443 ASSAY THYROID STIM HORMONE: CPT

## 2025-08-14 PROCEDURE — 82172 ASSAY OF APOLIPOPROTEIN: CPT

## 2025-08-14 PROCEDURE — 80061 LIPID PANEL: CPT | Mod: XU

## 2025-08-14 PROCEDURE — 82465 ASSAY BLD/SERUM CHOLESTEROL: CPT

## 2025-08-14 PROCEDURE — 83698 ASSAY LIPOPROTEIN PLA2: CPT

## 2025-08-14 PROCEDURE — 84439 ASSAY OF FREE THYROXINE: CPT

## 2025-08-29 LAB — TEST NAME 95000: NORMAL
